# Patient Record
Sex: FEMALE | Race: WHITE | Employment: OTHER | URBAN - METROPOLITAN AREA
[De-identification: names, ages, dates, MRNs, and addresses within clinical notes are randomized per-mention and may not be internally consistent; named-entity substitution may affect disease eponyms.]

---

## 2017-02-20 ENCOUNTER — TELEPHONE (OUTPATIENT)
Dept: FAMILY MEDICINE CLINIC | Age: 72
End: 2017-02-20

## 2017-02-20 NOTE — TELEPHONE ENCOUNTER
Pt states that she is having some frequency along with some occasional burning with urination. She has been pushing fluids today and wanted to see about dropping off a urine specimen and then getting back with her. Advised that we would like to see her. Advised that we have an urgnet care facility out on [address] [facility] that is a walk in and they are open until 9. She will try to call in the am and if she can't come in then she will go there for an appt.

## 2017-02-20 NOTE — TELEPHONE ENCOUNTER
Afsaneh Canon     -      [phone]     -  Has Bladder Infection requesting a call back from nurse

## 2017-02-21 ENCOUNTER — OFFICE VISIT (OUTPATIENT)
Dept: FAMILY MEDICINE CLINIC | Age: 72
End: 2017-02-21

## 2017-02-21 VITALS
BODY MASS INDEX: 26.68 KG/M2 | HEIGHT: 62 IN | WEIGHT: 145 LBS | OXYGEN SATURATION: 98 % | DIASTOLIC BLOOD PRESSURE: 80 MMHG | SYSTOLIC BLOOD PRESSURE: 138 MMHG | HEART RATE: 74 BPM | TEMPERATURE: 98 F | RESPIRATION RATE: 16 BRPM

## 2017-02-21 DIAGNOSIS — R07.81 RIB PAIN ON LEFT SIDE: Primary | ICD-10-CM

## 2017-02-21 DIAGNOSIS — R35.0 URINARY FREQUENCY: ICD-10-CM

## 2017-02-21 DIAGNOSIS — R93.89 ABNORMAL CHEST X-RAY: ICD-10-CM

## 2017-02-21 LAB
BILIRUB UR QL STRIP: NEGATIVE
GLUCOSE UR-MCNC: NEGATIVE MG/DL
KETONES P FAST UR STRIP-MCNC: NEGATIVE MG/DL
PH UR STRIP: 7 [PH] (ref 4.6–8)
PROT UR QL STRIP: NEGATIVE MG/DL
SP GR UR STRIP: 1.01 (ref 1–1.03)
UA UROBILINOGEN AMB POC: NORMAL (ref 0.2–1)
URINALYSIS CLARITY POC: CLEAR
URINALYSIS COLOR POC: YELLOW
URINE BLOOD POC: NEGATIVE
URINE LEUKOCYTES POC: NEGATIVE
URINE NITRITES POC: NEGATIVE

## 2017-02-21 RX ORDER — TIZANIDINE 2 MG/1
2 TABLET ORAL
Qty: 30 TAB | Refills: 1 | Status: SHIPPED | OUTPATIENT
Start: 2017-02-21 | End: 2017-07-18

## 2017-02-21 RX ORDER — NAPROXEN 375 MG/1
375 TABLET ORAL 2 TIMES DAILY WITH MEALS
Qty: 60 TAB | Refills: 0 | Status: SHIPPED | OUTPATIENT
Start: 2017-02-21 | End: 2017-07-18

## 2017-02-21 NOTE — PROGRESS NOTES
Rocky Salgado [address] [facility]  [address] [address] [place], [address]  [phone]             Date of visit: 2/21/2017   Subjective:      History obtained from:  the patient. Leonard Flores is a 73 y.o. female who presents today for left lateral rib pain for 4 days, has had this a couple of times before  Not sure of trigger  Generally active \"always moving furniture around\" but no unusual activity  Has bursitis in shoulder, had injection not long ago  This pain is posterior shoulder and down lateral back, hurts to be active, such as doing laundry  Breathing doesn't hurt  Taking naproxen, helping some  Heat helps some  In the past flexeril helped but doesn't have more    Also burning with urination several times  Trying to drink more water, now has frequency   No UTI since she was a teen    Also never got the CT chest to eval hilar fullness because her cough got better  Willing to check CXR today  Former smoker, quit for good 4 years ago    Was started on norvasc for bp recently  bp 120-130s/70-80s at home, brought in a few readings  Tolerating med fine    Patient Active Problem List    Diagnosis Date Noted    Benign essential hypertension 10/10/2016    Colonoscopy & Mammogram refused 10/10/2016    Dyslipidemia (high LDL; low HDL)      Current Outpatient Prescriptions   Medication Sig Dispense Refill    tiZANidine (ZANAFLEX) 2 mg tablet Take 1 Tab by mouth three (3) times daily as needed. Muscle pain 30 Tab 1    naproxen (NAPROSYN) 375 mg tablet Take 1 Tab by mouth two (2) times daily (with meals). 60 Tab 0    valsartan-hydroCHLOROthiazide (DIOVAN-HCT) 160-25 mg per tablet take 1 tablet by mouth once daily 30 Tab 5    CHOLECALCIFEROL, VITAMIN D3, (VITAMIN D3 PO) Take 2,000 Units by mouth daily.  amLODIPine (NORVASC) 5 mg tablet Take 1 Tab by mouth daily. 30 Tab 3    cetirizine (ZYRTEC) 10 mg tablet Take 1 Tab by mouth nightly.       MULTIVIT WITH CALCIUM,IRON,MIN [facility] DAILY MULTIVITAMIN PO) Take  by mouth daily.  DOCOSAHEXANOIC ACID/EPA (FISH OIL PO) Take 1,000 mg by mouth daily.  aspirin 81 mg tablet Take 81 mg by mouth daily. Indications: MYOCARDIAL INFARCTION PREVENTION       Allergies   Allergen Reactions    Pen-Vee K Itching     Past Medical History   Diagnosis Date    Benign essential hypertension 10/10/2016    Colonoscopy & Mammogram refused 10/10/2016    Dyslipidemia (high LDL; low HDL)     Full dentures     History of tobacco use      Past Surgical History   Procedure Laterality Date    Pr abdomen surgery proc unlisted      Hx gyn       D&C about 14    Hx tympanostomy Right ~     Dr Yemi Morales     Family History   Problem Relation Age of Onset   [facility] Breast Cancer Mother       age 48    Heart Disease Father     Hypertension Father      Social History   Substance Use Topics    Smoking status: Former Smoker     Packs/day: 1.00     Years: 25.00     Quit date: 2008    Smokeless tobacco: Never Used    Alcohol use No      Social History     Social History Narrative        Review of Systems  Gen: denies fever  GI denies n/v     Objective:     Vitals:    17 1623   BP: 138/80   Pulse: 74   Resp: 16   Temp: 98 °F (36.7 °C)   TempSrc: Oral   SpO2: 98%   Weight: 145 lb (65.8 kg)   Height: 5' 2\" (1.575 m)     Body mass index is 26.52 kg/(m^2). General: stated age, well developed, well nourished and in NAD  MSK: tenderness, mild, of left lateral/posterior ribs and just inferior to scapular blade, shoulder with limited ROM due to usual bursitis pain  Neuro: normal gait  Ext:  No edema noted.    Skin:  Normal. and no rash or abnormalities   Psych: alert and oriented to person, place, time and situation and Speech: appropriate quality, quantity and organization of sentences     Results for orders placed or performed in visit on 17   AMB POC URINALYSIS DIP STICK MANUAL W/O MICRO     Status: Normal   Result Value Ref Range Status    Color (UA POC) Yellow  Final    Clarity (UA POC) Clear  Final    Glucose (UA POC) Negative Negative Final    Bilirubin (UA POC) Negative Negative Final    Ketones (UA POC) Negative Negative Final    Specific gravity (UA POC) 1.010 1.001 - 1.035 Final    Blood (UA POC) Negative Negative Final    pH (UA POC) 7.0 4.6 - 8.0 Final    Protein (UA POC) Negative Negative mg/dL Final    Urobilinogen (UA POC) 0.2 mg/dL 0.2 - 1 Final    Nitrites (UA POC) Negative Negative Final    Leukocyte esterase (UA POC) Negative Negative Final   Results for orders placed or performed in visit on 11/05/14   AMB POC URINALYSIS DIP STICK AUTO W/O MICRO     Status: None   Result Value Ref Range Status    Color (UA POC) Light Yellow  Final    Clarity (UA POC) Turbid  Final    Glucose (UA POC) Negative Negative Final    Bilirubin (UA POC) Negative Negative Final    Ketones (UA POC) Negative Negative Final    Specific gravity (UA POC) 1.015 1.001 - 1.035 Final    Blood (UA POC) Negative Negative Final    pH (UA POC) 8.0 4.6 - 8.0 Final    Protein (UA POC) Negative Negative mg/dL Final    Urobilinogen (UA POC) 0.2 mg/dL 0.2 - 1 Final    Nitrites (UA POC) Negative Negative Final    Leukocyte esterase (UA POC) 2+ Negative Final     Assessment/Plan:       ICD-10-CM ICD-9-CM    1. Rib pain on left side R07.81 786.50    2. Urinary frequency R35.0 788.41 AMB POC URINALYSIS DIP STICK MANUAL W/O MICRO   3.  Abnormal chest x-ray R93.8 793.2 XR CHEST PA LAT        Orders Placed This Encounter    XR CHEST PA LAT    AMB POC URINALYSIS DIP STICK MANUAL W/O MICRO    tiZANidine (ZANAFLEX) 2 mg tablet    naproxen (NAPROSYN) 375 mg tablet       Rib pain likely muscular,possibly subscapular bursitis with pain radiating down  Continue heat, naproxen, add prn muscle relaxer as that helped in the past  Precautions about meds given    Reassured about urine  Advised to continue plenty of water  Will retest if symptoms worsen or come back    Will recheck CXR, advised if hilar fullness still she really needs the CT    norvasc seems to be working for bp    Discussed the diagnosis and plan and she expressed understanding. Follow-up Disposition:  Return if symptoms worsen or fail to improve.     Tatianna Hoffman MD

## 2017-02-21 NOTE — PATIENT INSTRUCTIONS
Musculoskeletal Chest Pain: Care Instructions  Your Care Instructions  Chest pain is not always a sign that something is wrong with your heart or that you have another serious problem. The doctor thinks your chest pain is caused by strained muscles or ligaments, inflamed chest cartilage, or another problem in your chest, rather than by your heart. You may need more tests to find the cause of your chest pain. Follow-up care is a key part of your treatment and safety. Be sure to make and go to all appointments, and call your doctor if you are having problems. Its also a good idea to know your test results and keep a list of the medicines you take. How can you care for yourself at home? · Take pain medicines exactly as directed. ¨ If the doctor gave you a prescription medicine for pain, take it as prescribed. ¨ If you are not taking a prescription pain medicine, ask your doctor if you can take an over-the-counter medicine. · Rest and protect the sore area. · Stop, change, or take a break from any activity that may be causing your pain or soreness. · Put ice or a cold pack on the sore area for 10 to 20 minutes at a time. Try to do this every 1 to 2 hours for the next 3 days (when you are awake) or until the swelling goes down. Put a thin cloth between the ice and your skin. · After 2 or 3 days, apply a heating pad set on low or a warm cloth to the area that hurts. Some doctors suggest that you go back and forth between hot and cold. · Do not wrap or tape your ribs for support. This may cause you to take smaller breaths, which could increase your risk of lung problems. · Mentholated creams such as Bengay or Icy Hot may soothe sore muscles. Follow the instructions on the package. · Follow your doctor's instructions for exercising. · Gentle stretching and massage may help you get better faster. Stretch slowly to the point just before pain begins, and hold the stretch for at least 15 to 30 seconds.  Do this 3 or 4 times a day. Stretch just after you have applied heat. · As your pain gets better, slowly return to your normal activities. Any increased pain may be a sign that you need to rest a while longer. When should you call for help? Call 911 anytime you think you may need emergency care. For example, call if:  · You have chest pain or pressure. This may occur with:  ¨ Sweating. ¨ Shortness of breath. ¨ Nausea or vomiting. ¨ Pain that spreads from the chest to the neck, jaw, or one or both shoulders or arms. ¨ Dizziness or lightheadedness. ¨ A fast or uneven pulse. After calling 911, chew 1 adult-strength aspirin. Wait for an ambulance. Do not try to drive yourself. · You have sudden chest pain and shortness of breath, or you cough up blood. Call your doctor now or seek immediate medical care if:  · You have any trouble breathing. · Your chest pain gets worse. · Your chest pain occurs consistently with exercise and is relieved by rest.  Watch closely for changes in your health, and be sure to contact your doctor if:  · Your chest pain does not get better after 1 week. Where can you learn more? Go to http://min-lisbeth.info/. Enter V293 in the search box to learn more about \"Musculoskeletal Chest Pain: Care Instructions. \"  Current as of: May 27, 2016  Content Version: 11.1  © 0278-0590 [employer], Incorporated. Care instructions adapted under license by [employer] (which disclaims liability or warranty for this information). If you have questions about a medical condition or this instruction, always ask your healthcare professional. [place] [address] any warranty or liability for your use of this information.

## 2017-02-21 NOTE — PROGRESS NOTES
Chief Complaint   Patient presents with    Bladder Infection     increased frequency,burning x 4-5 days    Rib Pain     started Friday night on left side, ? pulled muscle       Reviewed Record in preparation for visit and have obtained necessary documentation. Identified pt with two pt identifiers (Name @ )    Health Maintenance Due   Topic    GLAUCOMA SCREENING Q2Y     OSTEOPOROSIS SCREENING (DEXA)     FOBT Q 1 YEAR AGE 50-75          1. Have you been to the ER, urgent care clinic since your last visit? Hospitalized since your last visit? No    2. Have you seen or consulted any other health care providers outside of the [address] [place] [address] since your last visit? Include any pap smears or colon screening.  No

## 2017-02-23 ENCOUNTER — TELEPHONE (OUTPATIENT)
Dept: FAMILY MEDICINE CLINIC | Age: 72
End: 2017-02-23

## 2017-02-23 NOTE — TELEPHONE ENCOUNTER
Please let her know that chest xray was normal this time--no further imaging is needed, thanks, Ferdinand Quinonez MD 2/23/2017 11:12 AM     PI of results.

## 2017-06-12 RX ORDER — VALSARTAN AND HYDROCHLOROTHIAZIDE 160; 25 MG/1; MG/1
TABLET ORAL
Qty: 30 TAB | Refills: 1 | Status: SHIPPED | OUTPATIENT
Start: 2017-06-12 | End: 2017-07-18 | Stop reason: SDUPTHER

## 2017-07-18 ENCOUNTER — OFFICE VISIT (OUTPATIENT)
Dept: FAMILY MEDICINE CLINIC | Age: 72
End: 2017-07-18

## 2017-07-18 VITALS
WEIGHT: 141.8 LBS | BODY MASS INDEX: 26.09 KG/M2 | DIASTOLIC BLOOD PRESSURE: 87 MMHG | SYSTOLIC BLOOD PRESSURE: 149 MMHG | RESPIRATION RATE: 18 BRPM | HEIGHT: 62 IN | TEMPERATURE: 97.4 F | HEART RATE: 76 BPM | OXYGEN SATURATION: 99 %

## 2017-07-18 DIAGNOSIS — N81.9 FEMALE GENITAL PROLAPSE, UNSPECIFIED TYPE: ICD-10-CM

## 2017-07-18 DIAGNOSIS — Z12.11 COLON CANCER SCREENING: ICD-10-CM

## 2017-07-18 DIAGNOSIS — I10 BENIGN ESSENTIAL HYPERTENSION: Primary | ICD-10-CM

## 2017-07-18 DIAGNOSIS — F40.298 NEEDLE PHOBIA: ICD-10-CM

## 2017-07-18 RX ORDER — AMLODIPINE BESYLATE 5 MG/1
5 TABLET ORAL DAILY
Qty: 90 TAB | Refills: 4 | Status: SHIPPED | OUTPATIENT
Start: 2017-07-18 | End: 2017-10-10 | Stop reason: SDUPTHER

## 2017-07-18 RX ORDER — VALSARTAN AND HYDROCHLOROTHIAZIDE 160; 25 MG/1; MG/1
1 TABLET ORAL DAILY
Qty: 90 TAB | Refills: 4 | Status: SHIPPED | OUTPATIENT
Start: 2017-07-18 | End: 2018-07-31 | Stop reason: SDUPTHER

## 2017-07-18 NOTE — MR AVS SNAPSHOT
Visit Information Date & Time Provider Department Dept. Phone Encounter #  
 7/18/2017  1:45 PM Pasquale Haskins, [facility] [phone] [id number] Follow-up Instructions Return in about 4 months (around 11/18/2017). Your Appointments 10/4/2017  1:45 PM  
ROUTINE CARE with Hebert Judd MD  
[facility]) Appt Note: med check [address] [id number]  
[phone]  
  
   
 [address] [address] Upcoming Health Maintenance Date Due  
 GLAUCOMA SCREENING Q2Y 3/19/2012 OSTEOPOROSIS SCREENING (DEXA) 3/19/2012 FOBT Q 1 YEAR AGE 50-75 11/5/2015 INFLUENZA AGE 9 TO ADULT 8/1/2017 Pneumococcal 65+ Low/Medium Risk (2 of 2 - PCV13) 10/10/2017 MEDICARE YEARLY EXAM 10/11/2017 BREAST CANCER SCRN MAMMOGRAM 10/10/2018 DTaP/Tdap/Td series (2 - Td) 11/5/2024 Allergies as of 7/18/2017  Review Complete On: 7/18/2017 By: Pasquale Haskins MD  
  
 Severity Noted Reaction Type Reactions Pen-vee K  11/26/2010    Itching Current Immunizations  Reviewed on 10/10/2016 Name Date H1N1 FLU VACCINE 11/10/2010 Influenza High Dose Vaccine PF 10/10/2016 Influenza Vaccine 11/5/2014, 10/7/2013 Influenza Vaccine (Quad) PF 10/2/2015 Influenza Vaccine Split 10/26/2012, 11/4/2011, 10/8/2009 Pneumococcal Polysaccharide (PPSV-23) 10/10/2016 TD Vaccine 8/31/2003 Tdap 11/5/2014 Not reviewed this visit You Were Diagnosed With   
  
 Codes Comments Benign essential hypertension    -  Primary ICD-10-CM: I10 
ICD-9-CM: 401.1 Colon cancer screening     ICD-10-CM: Z12.11 ICD-9-CM: V76.51 Female genital prolapse, unspecified type     ICD-10-CM: N81.9 ICD-9-CM: 618.9 Vitals BP Pulse Temp Resp Height(growth percentile) Weight(growth percentile) 149/87 (BP 1 Location: Right arm, BP Patient Position: Sitting) 76 97.4 °F (36.3 °C) (Oral) 18 5' 2\" (1.575 m) 141 lb 12.8 oz (64.3 kg) SpO2 BMI OB Status Smoking Status 99% 25.94 kg/m2 Postmenopausal Former Smoker Vitals History BMI and BSA Data Body Mass Index Body Surface Area  
 25.94 kg/m 2 1.68 m 2 Preferred Pharmacy Pharmacy Name Phone [address] [facility], [address] [place] [address] Your Updated Medication List  
  
   
This list is accurate as of: 7/18/17  2:27 PM.  Always use your most recent med list. amLODIPine 5 mg tablet Commonly known as:  NORVASC  
take 1 tablet by mouth once daily  
  
 aspirin 81 mg tablet Take 81 mg by mouth daily. Indications: MYOCARDIAL INFARCTION PREVENTION  
  
 cetirizine 10 mg tablet Commonly known as:  ZYRTEC Take 1 Tab by mouth nightly. FISH OIL PO Take 1,000 mg by mouth daily. valsartan-hydroCHLOROthiazide 160-25 mg per tablet Commonly known as:  DIOVAN-HCT  
take 1 tablet by mouth once daily VITAMIN D3 PO Take 2,000 Units by mouth daily. WOMEN'S DAILY MULTIVITAMIN PO Take  by mouth daily. We Performed the Following OCCULT BLOOD, IMMUNOASSAY (FIT) [id number] CPT(R)] Follow-up Instructions Return in about 4 months (around 11/18/2017). Patient Instructions DASH Diet: Care Instructions Your Care Instructions The DASH diet is an eating plan that can help lower your blood pressure. DASH stands for Dietary Approaches to Stop Hypertension. Hypertension is high blood pressure. The DASH diet focuses on eating foods that are high in calcium, potassium, and magnesium. These nutrients can lower blood pressure. The foods that are highest in these nutrients are fruits, vegetables, low-fat dairy products, nuts, seeds, and legumes.  But taking calcium, potassium, and magnesium supplements instead of eating foods that are high in those nutrients does not have the same effect. The DASH diet also includes whole grains, fish, and poultry. The DASH diet is one of several lifestyle changes your doctor may recommend to lower your high blood pressure. Your doctor may also want you to decrease the amount of sodium in your diet. Lowering sodium while following the DASH diet can lower blood pressure even further than just the DASH diet alone. Follow-up care is a key part of your treatment and safety. Be sure to make and go to all appointments, and call your doctor if you are having problems. It's also a good idea to know your test results and keep a list of the medicines you take. How can you care for yourself at home? Following the DASH diet · Eat 4 to 5 servings of fruit each day. A serving is 1 medium-sized piece of fruit, ½ cup chopped or canned fruit, 1/4 cup dried fruit, or 4 ounces (½ cup) of fruit juice. Choose fruit more often than fruit juice. · Eat 4 to 5 servings of vegetables each day. A serving is 1 cup of lettuce or raw leafy vegetables, ½ cup of chopped or cooked vegetables, or 4 ounces (½ cup) of vegetable juice. Choose vegetables more often than vegetable juice. · Get 2 to 3 servings of low-fat and fat-free dairy each day. A serving is 8 ounces of milk, 1 cup of yogurt, or 1 ½ ounces of cheese. · Eat 6 to 8 servings of grains each day. A serving is 1 slice of bread, 1 ounce of dry cereal, or ½ cup of cooked rice, pasta, or cooked cereal. Try to choose whole-grain products as much as possible. · Limit lean meat, poultry, and fish to 2 servings each day. A serving is 3 ounces, about the size of a deck of cards. · Eat 4 to 5 servings of nuts, seeds, and legumes (cooked dried beans, lentils, and split peas) each week. A serving is 1/3 cup of nuts, 2 tablespoons of seeds, or ½ cup of cooked beans or peas. · Limit fats and oils to 2 to 3 servings each day. A serving is 1 teaspoon of vegetable oil or 2 tablespoons of salad dressing. · Limit sweets and added sugars to 5 servings or less a week. A serving is 1 tablespoon jelly or jam, ½ cup sorbet, or 1 cup of lemonade. · Eat less than 2,300 milligrams (mg) of sodium a day. If you limit your sodium to 1,500 mg a day, you can lower your blood pressure even more. Tips for success · Start small. Do not try to make dramatic changes to your diet all at once. You might feel that you are missing out on your favorite foods and then be more likely to not follow the plan. Make small changes, and stick with them. Once those changes become habit, add a few more changes. · Try some of the following: ¨ Make it a goal to eat a fruit or vegetable at every meal and at snacks. This will make it easy to get the recommended amount of fruits and vegetables each day. ¨ Try yogurt topped with fruit and nuts for a snack or healthy dessert. ¨ Add lettuce, tomato, cucumber, and onion to sandwiches. ¨ Combine a ready-made pizza crust with low-fat mozzarella cheese and lots of vegetable toppings. Try using tomatoes, squash, spinach, broccoli, carrots, cauliflower, and onions. ¨ Have a variety of cut-up vegetables with a low-fat dip as an appetizer instead of chips and dip. ¨ Sprinkle sunflower seeds or chopped almonds over salads. Or try adding chopped walnuts or almonds to cooked vegetables. ¨ Try some vegetarian meals using beans and peas. Add garbanzo or kidney beans to salads. Make burritos and tacos with mashed tipton beans or black beans. Where can you learn more? Go to http://min-lisbeth.info/. Enter H827 in the search box to learn more about \"DASH Diet: Care Instructions. \" Current as of: April 3, 2017 Content Version: 11.3 © 3955-1722 [employer].  Care instructions adapted under license by [address] (which disclaims liability or warranty for this information). If you have questions about a medical condition or this instruction, always ask your healthcare professional. [address] any warranty or liability for your use of this information. Introducing [facility]! Rob Rome introduces [employer] patient portal. Now you can access parts of your medical record, email your doctor's office, and request medication refills online. 1. In your internet browser, go to [email or web address] [employer] [employer] 2. Click on the First Time User? Click Here link in the Sign In box. You will see the New Member Sign Up page. 3. Enter your [employer] Access Code exactly as it appears below. You will not need to use this code after youve completed the sign-up process. If you do not sign up before the expiration date, you must request a new code. · [employer] Access Code: [email or web address] Expires: 10/16/2017  2:26 PM 
 
4. Enter the last four digits of your Social Security Number (xxxx) and Date of Birth (mm/dd/yyyy) as indicated and click Submit. You will be taken to the next sign-up page. 5. Create a [employer] ID. This will be your [employer] login ID and cannot be changed, so think of one that is secure and easy to remember. 6. Create a [employer] password. You can change your password at any time. 7. Enter your Password Reset Question and Answer. This can be used at a later time if you forget your password. 8. Enter your e-mail address. You will receive e-mail notification when new information is available in [address]. 9. Click Sign Up. You can now view and download portions of your medical record. 10. Click the Download Summary menu link to download a portable copy of your medical information. If you have questions, please visit the Frequently Asked Questions section of the [employer] website.  Remember, [employer] is NOT to be used for urgent needs. For medical emergencies, dial 911. Now available from your iPhone and Android! Please provide this summary of care documentation to your next provider. Your primary care clinician is listed as Romario Zuniga. If you have any questions after today's visit, please call [phone].

## 2017-07-18 NOTE — PATIENT INSTRUCTIONS

## 2017-07-18 NOTE — PROGRESS NOTES
Rocky Salgado [address] [place]. [place], [address]  [phone]             Date of visit: 7/18/17   Subjective:      History obtained from:  the patient. Veena Singh is a 81 y.o. female who presents today for mainly needs bp meds refilled    Takes meds regularly  Watching diet more  Avoiding bread  Red meat only once per month  Avoiding spaghetti and pizza  Lots of fish, chicken, salads, fruits and veggies    Was walking on treadmill but has been traveling more with her  who is working and travels a lot  Has been fighting weight since she quit smoking a few years ago  Downfall is pringles, but only 20 per day, no other junk foods    Does check her [id number]    She had rib pains last I saw her but those resolved quickly, thinks the muscle relaxer got rid of it    Calculated risk of breast cancer to be only 3% despite her mom's history; she really doesn't want another mammogram due to pain. Menarche 14  First baby 23  No other relatives      Discussed risks/benefits colon cancer screening, she absolutely doesn't want colonoscopy but willing to do stool test    Occasionally feels something prolapsing from vagina and wonders if she should see gyn. Not often enough that she would want treatment. Has been going on for a long time    Patient Active Problem List    Diagnosis Date Noted    Needle phobia 07/19/2017    Female genital prolapse 07/19/2017    Benign essential hypertension 10/10/2016    Colonoscopy & Mammogram refused 10/10/2016    Dyslipidemia (high LDL; low HDL)      Current Outpatient Prescriptions   Medication Sig Dispense Refill    valsartan-hydroCHLOROthiazide (DIOVAN-HCT) 160-25 mg per tablet Take 1 Tab by mouth daily. 90 Tab 4    amLODIPine (NORVASC) 5 mg tablet Take 1 Tab by mouth daily. 90 Tab 4    CHOLECALCIFEROL, VITAMIN D3, (VITAMIN D3 PO) Take 2,000 Units by mouth daily.       cetirizine (ZYRTEC) 10 mg tablet Take 1 Tab by mouth nightly.  MULTIVIT WITH CALCIUM,IRON,MIN (WOMEN'S DAILY MULTIVITAMIN PO) Take  by mouth daily.  DOCOSAHEXANOIC ACID/EPA (FISH OIL PO) Take 1,000 mg by mouth daily.  aspirin 81 mg tablet Take 81 mg by mouth daily. Indications: MYOCARDIAL INFARCTION PREVENTION       Allergies   Allergen Reactions    Ricardo-Chelsy DEAN Itching     Past Medical History:   Diagnosis Date    Benign essential hypertension 10/10/2016    Colonoscopy & Mammogram refused 10/10/2016    Dyslipidemia (high LDL; low HDL)     Full dentures     History of tobacco use      Past Surgical History:   Procedure Laterality Date    ABDOMEN SURGERY PROC UNLISTED      HX GYN      D&C about Roselee Ahumada HX TYMPANOSTOMY Right ~    Dr Sid Lynch     Family History   Problem Relation Age of Onset   [facility]vin Irvin Breast Cancer Mother       age 46    Heart Disease Father     Hypertension Father      Social History   Substance Use Topics    Smoking status: Former Smoker     Packs/day: 1.00     Years: 25.00     Quit date: 2008    Smokeless tobacco: Never Used    Alcohol use No      Social History     Social History Narrative        Review of Systems  Card: denies chest pain  Pulm: denies shortness of breath       Objective:     Vitals:    17 1342   BP: 149/87   Pulse: 76   Resp: 18   Temp: 97.4 °F (36.3 °C)   TempSrc: Oral   SpO2: 99%   Weight: 141 lb 12.8 oz (64.3 kg)   Height: 5' 2\" (1.575 m)     Body mass index is 25.94 kg/(m^2). General: stated age, well developed, well nourished and in NAD  Neck: supple, symmetrical, trachea midline, no adenopathy and thyroid: not enlarged, symmetric, no tenderness/mass/nodules  Lungs:  clear to auscultation w/o rales, rhonchi, wheezes w/normal effort and no use of accessory muscles of respiration   Heart: regular rate and rhythm, S1, S2 normal, no murmur, click, rub or gallop  Ext:  Trace feet edema noted.    Lymph: no cervical adenopathy appreciated  Skin:  Normal. and no rash or abnormalities Psych: alert and oriented to person, place, time and situation and Speech: appropriate quality, quantity and organization of sentences      Lab Results   Component Value Date/Time    Hemoglobin A1c 5.8 10/02/2015 09:09 AM     Lab Results   Component Value Date/Time    Cholesterol, total 217 10/10/2016 08:24 AM    HDL Cholesterol 46 10/10/2016 08:24 AM    LDL, calculated 132 10/10/2016 08:24 AM    VLDL, calculated 39 10/10/2016 08:24 AM    Triglyceride 195 10/10/2016 08:24 AM    CHOL/HDL Ratio 5.0 10/08/2009 09:43 AM     Lab Results   Component Value Date/Time    Sodium 138 10/10/2016 08:24 AM    Potassium 4.5 10/10/2016 08:24 AM    Chloride 97 10/10/2016 08:24 AM    CO2 25 10/10/2016 08:24 AM    Anion gap 9 10/08/2009 09:43 AM    Glucose 105 10/10/2016 08:24 AM    BUN 12 10/10/2016 08:24 AM    Creatinine 0.77 10/10/2016 08:24 AM    BUN/Creatinine ratio 16 10/10/2016 08:24 AM    GFR est AA 91 10/10/2016 08:24 AM    GFR est non-AA 79 10/10/2016 08:24 AM    Calcium 9.4 10/10/2016 08:24 AM    Bilirubin, total 0.4 10/10/2016 08:24 AM    AST (SGOT) 22 10/10/2016 08:24 AM    Alk. phosphatase 81 10/10/2016 08:24 AM    Protein, total 6.7 10/10/2016 08:24 AM    Albumin 4.6 10/10/2016 08:24 AM    A-G Ratio 2.2 10/10/2016 08:24 AM    ALT (SGPT) 21 10/10/2016 08:24 AM     Lab Results   Component Value Date/Time    WBC 4.7 10/10/2016 08:24 AM    HGB 13.0 10/10/2016 08:24 AM    HCT 39.5 10/10/2016 08:24 AM    PLATELET 964 40/44/6687 08:24 AM    MCV 91 10/10/2016 08:24 AM     Lab Results   Component Value Date/Time    TSH 3.790 10/10/2016 08:24 AM     Lab Results   Component Value Date/Time    Vitamin D 25-Hydroxy 19.6 01/12/2011 08:31 AM    VITAMIN D, 25-HYDROXY 40.5 10/02/2015 09:09 AM           Assessment/Plan:       ICD-10-CM ICD-9-CM    1. Benign essential hypertension I10 401.1    2. Female genital prolapse, unspecified type N81.9 618.9    3. Needle phobia F40.298 300.29    4.  Colon cancer screening Z12.11 V76.51 OCCULT BLOOD, IMMUNOASSAY (FIT)       Orders Placed This Encounter    OCCULT BLOOD, IMMUNOASSAY (FIT)    valsartan-hydroCHLOROthiazide (DIOVAN-HCT) 160-25 mg per tablet    amLODIPine (NORVASC) 5 mg tablet     bp doing well, refill meds  Encouraged continued healthy lifestyle  Declines mammogram and colonoscopy but willing to do FOBT, sent home a kit  Sounds like she is having occasional pelvic organ prolapse but has been going on for a long time, not getting worse, not bothering her much. Will plan to do pelvic exam next visit with her Medicare wellness visit  Declines labs today (needle phobia); ok to put off until next visit    Discussed the diagnosis and plan and she expressed understanding. Follow-up Disposition:  Return in about 4 months (around 11/18/2017).  AWV, pelvic exam    Rafy Cee MD

## 2017-07-18 NOTE — PROGRESS NOTES
Chief Complaint   Patient presents with    Hypertension       Reviewed Record in preparation for visit and have obtained necessary documentation. Identified pt with two pt identifiers (Name @ )    Health Maintenance Due   Topic    GLAUCOMA SCREENING Q2Y     OSTEOPOROSIS SCREENING (DEXA)     FOBT Q 1 YEAR AGE 50-75          1. Have you been to the ER, urgent care clinic since your last visit? Hospitalized since your last visit? No    2. Have you seen or consulted any other health care providers outside of the [address] [place] [address] since your last visit? Include any pap smears or colon screening.  No

## 2017-07-19 PROBLEM — F40.298 NEEDLE PHOBIA: Status: ACTIVE | Noted: 2017-07-19

## 2017-07-19 PROBLEM — N81.9 FEMALE GENITAL PROLAPSE: Status: ACTIVE | Noted: 2017-07-19

## 2017-10-09 ENCOUNTER — TELEPHONE (OUTPATIENT)
Dept: FAMILY MEDICINE CLINIC | Age: 72
End: 2017-10-09

## 2017-10-09 PROBLEM — R73.02 IMPAIRED GLUCOSE TOLERANCE: Status: ACTIVE | Noted: 2017-10-09

## 2017-10-09 NOTE — TELEPHONE ENCOUNTER
LM for return call    Spoke to pt and she just wanted to find out if they needed to get blood work before their appt today. It is too late and will discuss with Dr Gianni Silva at appt today.

## 2017-10-09 NOTE — TELEPHONE ENCOUNTER
----- Message from Suyapa Somers sent at 10/7/2017  9:28 AM EDT -----  Regarding: Dr. Todd Poster: [phone]  Pt is requesting a callback from nurse. The best contact number is [phone].

## 2017-10-10 ENCOUNTER — OFFICE VISIT (OUTPATIENT)
Dept: FAMILY MEDICINE CLINIC | Age: 72
End: 2017-10-10

## 2017-10-10 ENCOUNTER — HOSPITAL ENCOUNTER (OUTPATIENT)
Dept: LAB | Age: 72
Discharge: HOME | End: 2017-10-10
Payer: MEDICARE

## 2017-10-10 VITALS
SYSTOLIC BLOOD PRESSURE: 132 MMHG | RESPIRATION RATE: 15 BRPM | HEIGHT: 62 IN | BODY MASS INDEX: 25.95 KG/M2 | DIASTOLIC BLOOD PRESSURE: 80 MMHG | TEMPERATURE: 97.5 F | WEIGHT: 141 LBS | OXYGEN SATURATION: 100 % | HEART RATE: 71 BPM

## 2017-10-10 DIAGNOSIS — L30.9 DERMATITIS: ICD-10-CM

## 2017-10-10 DIAGNOSIS — R73.02 IMPAIRED GLUCOSE TOLERANCE: ICD-10-CM

## 2017-10-10 DIAGNOSIS — N81.9 FEMALE GENITAL PROLAPSE, UNSPECIFIED TYPE: ICD-10-CM

## 2017-10-10 DIAGNOSIS — Z00.00 MEDICARE ANNUAL WELLNESS VISIT, SUBSEQUENT: Primary | ICD-10-CM

## 2017-10-10 DIAGNOSIS — I10 BENIGN ESSENTIAL HYPERTENSION: ICD-10-CM

## 2017-10-10 DIAGNOSIS — E78.5 DYSLIPIDEMIA (HIGH LDL; LOW HDL): ICD-10-CM

## 2017-10-10 DIAGNOSIS — Z23 ENCOUNTER FOR IMMUNIZATION: ICD-10-CM

## 2017-10-10 PROCEDURE — 80061 LIPID PANEL: CPT

## 2017-10-10 PROCEDURE — 85025 COMPLETE CBC W/AUTO DIFF WBC: CPT

## 2017-10-10 PROCEDURE — 36415 COLL VENOUS BLD VENIPUNCTURE: CPT

## 2017-10-10 PROCEDURE — 83036 HEMOGLOBIN GLYCOSYLATED A1C: CPT

## 2017-10-10 PROCEDURE — 80053 COMPREHEN METABOLIC PANEL: CPT

## 2017-10-10 RX ORDER — AMLODIPINE BESYLATE 5 MG/1
5 TABLET ORAL
Qty: 90 TAB | Refills: 4 | COMMUNITY
Start: 2017-10-10 | End: 2018-07-31 | Stop reason: SDUPTHER

## 2017-10-10 RX ORDER — TRIAMCINOLONE ACETONIDE 1 MG/G
OINTMENT TOPICAL 2 TIMES DAILY
Qty: 30 G | Refills: 0 | Status: SHIPPED | OUTPATIENT
Start: 2017-10-10 | End: 2018-04-26 | Stop reason: SDUPTHER

## 2017-10-10 NOTE — PATIENT INSTRUCTIONS

## 2017-10-10 NOTE — PROGRESS NOTES
Rocky Salgado [address] [facility]. [place], [address] [facility]  [phone]           Date of visit: 10/10/17       This is an Subsequent Medicare Annual Wellness Visit (AWV), (Performed more than 12 months after effective date of Medicare Part B enrollment and 12 months after last preventive visit)    I have reviewed the patient's medical history in detail and updated the computerized patient record. Wanda Campbell is a 81 y.o. female   History obtained from: the patient. Concerns today   (Patient understands that medical problems addressed today may incur additional cost as this is a preventive visit)  -still has the pelvic prolapse, doesn't really hurt or bother her much but can always feel it if she is standing, thinks it is her uterus.  -bp this AM was 97/57 at home. Brought list of BPs and most were in the 120s/70s  Does often feel lightheaded in the morning after taking her meds  -only ate cherrios this AM  Used to take meds at night but then moved them to morning. (norvasc and diovan hct)  If she has coffee first she does fine.    -would like to get her labs while she is here  -feeling well overall  -wants flu shot, not wanting prevnar because she got sick after pneumovax last year.  -has been a few years since she saw eye doctor but will schedule  -itchy rash in axillas after using an old deodorant.  Used an old tube of nystatin/triamcinolone and helped it a lot  -first had tried antifungal alone that did not work  -much better but not gone, wondered if I could refill the nystatin/triamcinolone    History     Patient Active Problem List   Diagnosis Code    Dyslipidemia (high LDL; low HDL) E78.4    Benign essential hypertension I10    Colonoscopy & Mammogram refused Z53.20    Needle phobia F40.298    Female genital prolapse N81.9    Impaired glucose tolerance R73.02     Past Medical History:   Diagnosis Date    Benign essential hypertension 10/10/2016    Colonoscopy & Mammogram refused 10/10/2016    Dyslipidemia (high LDL; low HDL)     Full dentures     History of tobacco use       Past Surgical History:   Procedure Laterality Date    ABDOMEN SURGERY PROC UNLISTED      HX GYN      D&C about 14    HX TYMPANOSTOMY Right ~    Dr Dorie Banerjee     Allergies   Allergen Reactions    Ricardo-Chelsy DEAN Itching     Current Outpatient Prescriptions   Medication Sig Dispense Refill    amLODIPine (NORVASC) 5 mg tablet Take 1 Tab by mouth nightly. 90 Tab 4    triamcinolone acetonide (KENALOG) 0.1 % ointment Apply  to affected area two (2) times a day. use thin layer 30 g 0    valsartan-hydroCHLOROthiazide (DIOVAN-HCT) 160-25 mg per tablet Take 1 Tab by mouth daily. 90 Tab 4    CHOLECALCIFEROL, VITAMIN D3, (VITAMIN D3 PO) Take 2,000 Units by mouth daily.  cetirizine (ZYRTEC) 10 mg tablet Take 1 Tab by mouth nightly.  MULTIVIT WITH CALCIUM,IRON,MIN (WOMEN'S DAILY MULTIVITAMIN PO) Take  by mouth daily.  DOCOSAHEXANOIC ACID/EPA (FISH OIL PO) Take 1,000 mg by mouth daily.  aspirin 81 mg tablet Take 81 mg by mouth daily. Indications: MYOCARDIAL INFARCTION PREVENTION       Family History   Problem Relation Age of Onset   [facility] Breast Cancer Mother       age 48    Heart Disease Father     Hypertension Father      Social History   Substance Use Topics    Smoking status: Former Smoker     Packs/day: 1.00     Years: 25.00     Quit date: 2008    Smokeless tobacco: Never Used    Alcohol use No       Specialists/Care Team   Lorraine Haynes has established care with the following healthcare providers:  Patient Care Team:  Abdirashid Khan MD as PCP - General (Family Practice)  Has an eye doctor will schedule    Health Risk Assessment     Demographics   female  81 y.o. General Health Questions   -During the past 4 weeks:   -how would you rate your health in general? Good   -how often have you been bothered by feeling dizzy when standing up?  Occasionally lightheaded in AM after BP med.   -how much have you been bothered by bodily pain? mildly   -Have you noticed any hearing difficulties? no   -has your physical and emotional health limited your social activities with family or friends? no    Emotional Health Questions   -Do you have a history of depression, anxiety, or emotional problems? no  -Over the past 2 weeks, have you felt down, depressed or hopeless? no  -Over the past 2 weeks, have you felt little interest or pleasure in doing things? no    Health Habits   Please describe your diet habits: they eat very well, plenty of veggies, beef only about once per week, mostly chicken and fish, drinks water  Do you get 5 servings of fruits or vegetables daily? yes  Do you exercise regularly? yes    Activities of Daily Living and Functional Status   -Do you need help with eating, walking, dressing, bathing, toileting, the phone, transportation, shopping, preparing meals, housework, laundry, medications or managing money? no  -In the past four weeks, was someone available to help you if you needed and wanted help with anything? yes  -Are you confident are you that you can control and manage most of your health problems? yes  -Have you been given information to help you keep track of your medications? yes  -How often do you have trouble taking your medications as prescribed? never    Fall Risk and Home Safety   Have you fallen 2 or more times in the past year? no  Does your home have rugs in the hallway, lack grab bars in the bathroom, lack handrails on the stairs or have poor lighting? No, has a grabbar but doesn't use it. Does try to be very careful  Do you have smoke detectors and check them regularly?  yes  Do you have difficulties driving a car? no  Do you always fasten your seat belt when you are in a car? yes    Review of Systems (if indicated for problems addressed today)   Card: denies chest pain  Pulm: denies shortness of breath       Physical Examination Vitals:    10/10/17 1333 10/10/17 1412   BP: 147/87 132/80   Pulse: 71    Resp: 15    Temp: 97.5 °F (36.4 °C)    TempSrc: Oral    SpO2: 100%    Weight: 141 lb (64 kg)    Height: 5' 2\" (1.575 m)      Body mass index is 25.79 kg/(m^2). No exam data present  Was the patient's timed Up & Go test unsteady or longer than 30 seconds? no    Evaluation of Cognitive Function   Mood/affect:  neutral  Orientation: normal  Appearance: age appropriate  Family member/caregiver input: none    Additional exam if indicated for problems addressed today:  General: stated age, well developed, well nourished and in NAD  Neck: supple, symmetrical, trachea midline, no adenopathy and thyroid: not enlarged, symmetric, no tenderness/mass/nodules  Lungs:  clear to auscultation w/o rales, rhonchi, wheezes w/normal effort and no use of accessory muscles of respiration   Heart: regular rate and rhythm, S1, S2 normal, no murmur, click, rub or gallop  Abdomen: soft, nontender, no masses  : about 3cm diameter round mass protruding past introitus with a central dell, consistent with uterus/cervix, easily reducible, nontender, no bladder or rectal prolapse seen or felt, no other lesions, no uterine/adenxal tenderness or enlargement on exam  Ext:  No edema noted.    Lymph: no cervical adenopathy appreciated  Skin:  Normal. and no rash or abnormalities other than mild erythematous patches in both axillas  Psych: alert and oriented to person, place, time and situation and Speech: appropriate quality, quantity and organization of sentences     Advice/Referrals/Counseling (as indicated)   Education and counseling provided for any problems identified above: continued healthy diet/exercise encouraged    Preventive Services     Health Maintenance   Topic Date Due    GLAUCOMA SCREENING Q2Y  03/19/2012    OSTEOPOROSIS SCREENING (DEXA)  03/19/2012    FOBT Q 1 YEAR AGE 50-75  11/05/2015    Pneumococcal 65+ Low/Medium Risk (2 of 2 - PCV13) 10/10/2017  MEDICARE YEARLY EXAM  10/11/2017    BREAST CANCER SCRN MAMMOGRAM  10/10/2018    DTaP/Tdap/Td series (2 - Td) 11/05/2024    Hepatitis C Screening  Addressed    ZOSTER VACCINE AGE 60>  Addressed    INFLUENZA AGE 9 TO ADULT  Completed      (Preventive care checklist to be included in patient instructions)  Discussed today Done Previously Not Needed    X put off prevnar 13 for now   Pneumococcal vaccines   x   Flu vaccine     x Hepatitis B vaccine (if at risk)   X will wait until next year   Shingles vaccine    x  TDAP vaccine   X declines   Mammogram     x Pap smear   X FOBT at home   Colorectal cancer screening     x Low-dose CT for lung cancer screening   X declines now   Bone density test    X few years  Glaucoma screening   x   Cholesterol test   x   Diabetes screening test      x Diabetes self-management class     x Nutritionist referral for diabetes or renal disease     Discussion of Advance Directive   Discussed with Yohana Higgins her ability to prepare and advance directive in the case that an injury or illness causes her to be unable to make health care decisions. Gave her honoring choices packet to take home and consider, she was interested in making an AD. Assessment/Plan   Z00.00    ICD-10-CM ICD-9-CM    1. Medicare annual wellness visit, subsequent Z00.00 V70.0    2. Female genital prolapse, unspecified type N81.9 618.9 REFERRAL TO FEMALE PELVIC MEDICINE AND RECONSTRUCTIVE SURGERY   3. Impaired glucose tolerance R73.02 790.22 HEMOGLOBIN A1C WITH EAG   4. Dyslipidemia (high LDL; low HDL) M13.8 072.3 METABOLIC PANEL, COMPREHENSIVE      LIPID PANEL   5. Benign essential hypertension I10 401.1 CBC WITH AUTOMATED DIFF   6. Dermatitis L30.9 692.9    7.  Encounter for immunization Z23 V03.89 INFLUENZA VIRUS VACCINE, HIGH DOSE SEASONAL, PRESERVATIVE FREE       Orders Placed This Encounter    Influenza virus vaccine (FLUZONE HIGH-DOSE) 65 years and older    CBC WITH AUTOMATED DIFF    METABOLIC PANEL, COMPREHENSIVE    HEMOGLOBIN A1C WITH EAG    LIPID PANEL    CVD REPORT    REFERRAL TO FEMALE PELVIC MEDICINE AND RECONSTRUCTIVE SURGERY    amLODIPine (NORVASC) 5 mg tablet    triamcinolone acetonide (KENALOG) 0.1 % ointment     Preventive care as above, still declining some tests  Doing very well with lifestyle--this was encouraged  Refer to urogyn for the uterine prolapse quite large  Labs for the HTN, HLD, IGT  bp ok here but sometimes low and with dizziness at home. Advised to move the amlodipine to bedtime to spread out meds more. Monitor at home if still often running in 120s we could cut back med some,e sp if she is having dizziness. Try plain triamcinolone ointment to axillary rashes suspect allergic dermatitis    Follow-up Disposition:  Return in about 6 months (around 4/10/2018).     Bharat Loja MD

## 2017-10-10 NOTE — MR AVS SNAPSHOT
Visit Information Date & Time Provider Department Dept. Phone Encounter #  
 10/10/2017  1:30 PM Davey Lo, [address] [phone] [id number] Upcoming Health Maintenance Date Due  
 GLAUCOMA SCREENING Q2Y 3/19/2012 OSTEOPOROSIS SCREENING (DEXA) 3/19/2012 FOBT Q 1 YEAR AGE 50-75 11/5/2015 INFLUENZA AGE 9 TO ADULT 8/1/2017 Pneumococcal 65+ Low/Medium Risk (2 of 2 - PCV13) 10/10/2017 MEDICARE YEARLY EXAM 10/11/2017 BREAST CANCER SCRN MAMMOGRAM 10/10/2018 DTaP/Tdap/Td series (2 - Td) 11/5/2024 Allergies as of 10/10/2017  Review Complete On: 10/10/2017 By: Davey Lo MD  
  
 Severity Noted Reaction Type Reactions Pen-vee K  11/26/2010    Itching Current Immunizations  Reviewed on 10/4/2017 Name Date H1N1 FLU VACCINE 11/10/2010 Influenza High Dose Vaccine PF 10/10/2016 Influenza Vaccine 11/5/2014, 10/7/2013 Influenza Vaccine (Quad) PF 10/2/2015 Influenza Vaccine Split 10/26/2012, 11/4/2011, 10/8/2009 Pneumococcal Polysaccharide (PPSV-23) 10/10/2016 TD Vaccine 8/31/2003 Tdap 11/5/2014 Not reviewed this visit You Were Diagnosed With   
  
 Codes Comments Medicare annual wellness visit, subsequent    -  Primary ICD-10-CM: Z00.00 ICD-9-CM: V70.0 Female genital prolapse, unspecified type     ICD-10-CM: N81.9 ICD-9-CM: 618.9 Impaired glucose tolerance     ICD-10-CM: R73.02 
ICD-9-CM: 790.22 Dyslipidemia (high LDL; low HDL)     ICD-10-CM: E78.4 ICD-9-CM: 272.4 Benign essential hypertension     ICD-10-CM: I10 
ICD-9-CM: 401.1 Vitals BP Pulse Temp Resp Height(growth percentile) Weight(growth percentile) 132/80 71 97.5 °F (36.4 °C) (Oral) 15 5' 2\" (1.575 m) 141 lb (64 kg) SpO2 BMI OB Status Smoking Status 100% 25.79 kg/m2 Postmenopausal Former Smoker Vitals History BMI and BSA Data Body Mass Index Body Surface Area 25.79 kg/m 2 1.67 m 2 Preferred Pharmacy Pharmacy Name Phone [address] [facility], [address] [facility] Your Updated Medication List  
  
   
This list is accurate as of: 10/10/17  2:22 PM.  Always use your most recent med list. amLODIPine 5 mg tablet Commonly known as:  Alf Presser Take 1 Tab by mouth nightly. aspirin 81 mg tablet Take 81 mg by mouth daily. Indications: MYOCARDIAL INFARCTION PREVENTION  
  
 cetirizine 10 mg tablet Commonly known as:  ZYRTEC Take 1 Tab by mouth nightly. FISH OIL PO Take 1,000 mg by mouth daily. triamcinolone acetonide 0.1 % ointment Commonly known as:  KENALOG Apply  to affected area two (2) times a day. use thin layer  
  
 valsartan-hydroCHLOROthiazide 160-25 mg per tablet Commonly known as:  DIOVAN-HCT Take 1 Tab by mouth daily. VITAMIN D3 PO Take 2,000 Units by mouth daily. WOMEN'S DAILY MULTIVITAMIN PO Take  by mouth daily. Prescriptions Sent to Pharmacy Refills  
 triamcinolone acetonide (KENALOG) 0.1 % ointment 0 Sig: Apply  to affected area two (2) times a day. use thin layer Class: Normal  
 Pharmacy: [address] [facility], [address] [place] [address] Ph #: [phone] Route: Topical  
  
We Performed the Following CBC WITH AUTOMATED DIFF [97378 CPT(R)] HEMOGLOBIN A1C WITH EAG [54457 CPT(R)] LIPID PANEL [88665 CPT(R)] METABOLIC PANEL, COMPREHENSIVE [16783 CPT(R)] REFERRAL TO FEMALE PELVIC MEDICINE AND RECONSTRUCTIVE SURGERY [COB110 Custom] Referral Information Referral ID Referred By Referred To  
  
 [id number] [facility] [place] [address] [address] Visits Status Start Date End Date 1 New Request 10/10/17 10/10/18  If your referral has a status of pending review or denied, additional information will be sent to support the outcome of this decision. Patient Instructions Well Visit, Over 72: Care Instructions Your Care Instructions Physical exams can help you stay healthy. Your doctor has checked your overall health and may have suggested ways to take good care of yourself. He or she also may have recommended tests. At home, you can help prevent illness with healthy eating, regular exercise, and other steps. Follow-up care is a key part of your treatment and safety. Be sure to make and go to all appointments, and call your doctor if you are having problems. It's also a good idea to know your test results and keep a list of the medicines you take. How can you care for yourself at home? · Reach and stay at a healthy weight. This will lower your risk for many problems, such as obesity, diabetes, heart disease, and high blood pressure. · Get at least 30 minutes of exercise on most days of the week. Walking is a good choice. You also may want to do other activities, such as running, swimming, cycling, or playing tennis or team sports. · Do not smoke. Smoking can make health problems worse. If you need help quitting, talk to your doctor about stop-smoking programs and medicines. These can increase your chances of quitting for good. · Protect your skin from too much sun. When you're outdoors from 10 a.m. to 4 p.m., stay in the shade or cover up with clothing and a hat with a wide brim. Wear sunglasses that block UV rays. Even when it's cloudy, put broad-spectrum sunscreen (SPF 30 or higher) on any exposed skin. · See a dentist one or two times a year for checkups and to have your teeth cleaned. · Wear a seat belt in the car. · Limit alcohol to 2 drinks a day for men and 1 drink a day for women. Too much alcohol can cause health problems. Follow your doctor's advice about when to have certain tests. These tests can spot problems early. For men and women · Cholesterol. Your doctor will tell you how often to have this done based on your overall health and other things that can increase your risk for heart attack and stroke. · Blood pressure. Have your blood pressure checked during a routine doctor visit. Your doctor will tell you how often to check your blood pressure based on your age, your blood pressure results, and other factors. · Diabetes. Ask your doctor whether you should have tests for diabetes. · Vision. Experts recommend that you have yearly exams for glaucoma and other age-related eye problems. · Hearing. Tell your doctor if you notice any change in your hearing. You can have tests to find out how well you hear. · Colon cancer tests. Keep having colon cancer tests as your doctor recommends. You can have one of several types of tests. · Heart attack and stroke risk. At least every 4 to 6 years, you should have your risk for heart attack and stroke assessed. Your doctor uses factors such as your age, blood pressure, cholesterol, and whether you smoke or have diabetes to show what your risk for a heart attack or stroke is over the next 10 years. · Osteoporosis. Talk to your doctor about whether you should have a bone density test to find out whether you have thinning bones. Also ask your doctor about whether you should take calcium and vitamin D supplements. For women · Pap test and pelvic exam. You may no longer need a Pap test. Talk with your doctor about whether to stop or continue to have Pap tests. · Breast exam and mammogram. Ask how often you should have a mammogram, which is an X-ray of your breasts. A mammogram can spot breast cancer before it can be felt and when it is easiest to treat. · Thyroid disease. Talk to your doctor about whether to have your thyroid checked as part of a regular physical exam. Women have an increased chance of a thyroid problem. For men · Prostate exam. Talk to your doctor about whether you should have a blood test (called a PSA test) for prostate cancer. Experts disagree on whether men should have this test. Some experts recommend that you discuss the benefits and risks of the test with your doctor. · Abdominal aortic aneurysm. Ask your doctor whether you should have a test to check for an aneurysm. You may need a test if you ever smoked or if your parent, brother, sister, or child has had an aneurysm. When should you call for help? Watch closely for changes in your health, and be sure to contact your doctor if you have any problems or symptoms that concern you. Where can you learn more? Go to http://min-lisbeth.info/. Enter K451 in the search box to learn more about \"Well Visit, Over 65: Care Instructions. \" Current as of: July 19, 2016 Content Version: 11.3 © 2194-4351 OfficialVirtualDJ. Care instructions adapted under license by [employer] (which disclaims liability or warranty for this information). If you have questions about a medical condition or this instruction, always ask your healthcare professional. [place] [address] any warranty or liability for your use of this information. Introducing [facility] & HEALTH SERVICES! Jadyn Smith introduces [employer] patient portal. Now you can access parts of your medical record, email your doctor's office, and request medication refills online. 1. In your internet browser, go to [email or web address]://[email or web address] Buy buy tea/Nugg Solutions 2. Click on the First Time User? Click Here link in the Sign In box. You will see the New Member Sign Up page. 3. Enter your Payward Access Code exactly as it appears below. You will not need to use this code after youve completed the sign-up process. If you do not sign up before the expiration date, you must request a new code. · Payward Access Code: [email or web address] Expires: 10/16/2017  2:26 PM 
 
4.  Enter the last four digits of your Social Security Number (xxxx) and Date of Birth (mm/dd/yyyy) as indicated and click Submit. You will be taken to the next sign-up page. 5. Create a Ad Dynamo ID. This will be your Ad Dynamo login ID and cannot be changed, so think of one that is secure and easy to remember. 6. Create a Ad [employer]o password. You can change your password at any time. 7. Enter your Password Reset Question and Answer. This can be used at a later time if you forget your password. 8. Enter your e-mail address. You will receive e-mail notification when new information is available in [address] 9. Click Sign Up. You can now view and download portions of your medical record. 10. Click the Download Summary menu link to download a portable copy of your medical information. If you have questions, please visit the Frequently Asked Questions section of the [employer] website. Remember, [employer]o is NOT to be used for urgent needs. For medical emergencies, dial 911. Now available from your iPhone and Android! Please provide this summary of care documentation to your next provider. Your primary care clinician is listed as Jeffry Mayo. If you have any questions after today's visit, please call [phone].

## 2017-10-10 NOTE — LETTER
10/11/2017 12:53 PM 
 
RE:    Emily Loges [address]  [facility] [address] [id number] Dear Dr. Elizabeth Johns, Thank you for agreeing to see Kirill Warner I am referring my patient to you for evaluation of uterine prolapse for years, getting worse. Not really causing her much pain, but's large enough I worry it will get painful with more time. I appreciate your assistance in Ms. Levi's care  and look forward to your findings and recommendations. Sincerely, Davey Lo MD

## 2017-10-11 LAB
ALBUMIN SERPL-MCNC: 4.8 G/DL (ref 3.5–4.8)
ALBUMIN/GLOB SERPL: 2 {RATIO} (ref 1.2–2.2)
ALP SERPL-CCNC: 96 IU/L (ref 39–117)
ALT SERPL-CCNC: 23 IU/L (ref 0–32)
AST SERPL-CCNC: 22 IU/L (ref 0–40)
BASOPHILS # BLD AUTO: 0 X10E3/UL (ref 0–0.2)
BASOPHILS NFR BLD AUTO: 1 %
BILIRUB SERPL-MCNC: 0.5 MG/DL (ref 0–1.2)
BUN SERPL-MCNC: 13 MG/DL (ref 8–27)
BUN/CREAT SERPL: 17 (ref 12–28)
CALCIUM SERPL-MCNC: 9.5 MG/DL (ref 8.7–10.3)
CHLORIDE SERPL-SCNC: 97 MMOL/L (ref 96–106)
CHOLEST SERPL-MCNC: 220 MG/DL (ref 100–199)
CO2 SERPL-SCNC: 21 MMOL/L (ref 18–29)
CREAT SERPL-MCNC: 0.77 MG/DL (ref 0.57–1)
EOSINOPHIL # BLD AUTO: 0 X10E3/UL (ref 0–0.4)
EOSINOPHIL NFR BLD AUTO: 1 %
ERYTHROCYTE [DISTWIDTH] IN BLOOD BY AUTOMATED COUNT: 13.6 % (ref 12.3–15.4)
EST. AVERAGE GLUCOSE BLD GHB EST-MCNC: 117 MG/DL
GLOBULIN SER CALC-MCNC: 2.4 G/DL (ref 1.5–4.5)
GLUCOSE SERPL-MCNC: 98 MG/DL (ref 65–99)
HBA1C MFR BLD: 5.7 % (ref 4.8–5.6)
HCT VFR BLD AUTO: 36.5 % (ref 34–46.6)
HDLC SERPL-MCNC: 46 MG/DL
HGB BLD-MCNC: 12.4 G/DL (ref 11.1–15.9)
IMM GRANULOCYTES # BLD: 0 X10E3/UL (ref 0–0.1)
IMM GRANULOCYTES NFR BLD: 0 %
INTERPRETATION, 910389: NORMAL
LDLC SERPL CALC-MCNC: 142 MG/DL (ref 0–99)
LYMPHOCYTES # BLD AUTO: 1.2 X10E3/UL (ref 0.7–3.1)
LYMPHOCYTES NFR BLD AUTO: 23 %
MCH RBC QN AUTO: 28.9 PG (ref 26.6–33)
MCHC RBC AUTO-ENTMCNC: 34 G/DL (ref 31.5–35.7)
MCV RBC AUTO: 85 FL (ref 79–97)
MONOCYTES # BLD AUTO: 0.4 X10E3/UL (ref 0.1–0.9)
MONOCYTES NFR BLD AUTO: 7 %
NEUTROPHILS # BLD AUTO: 3.8 X10E3/UL (ref 1.4–7)
NEUTROPHILS NFR BLD AUTO: 68 %
PLATELET # BLD AUTO: 245 X10E3/UL (ref 150–379)
POTASSIUM SERPL-SCNC: 4.2 MMOL/L (ref 3.5–5.2)
PROT SERPL-MCNC: 7.2 G/DL (ref 6–8.5)
RBC # BLD AUTO: 4.29 X10E6/UL (ref 3.77–5.28)
SODIUM SERPL-SCNC: 138 MMOL/L (ref 134–144)
TRIGL SERPL-MCNC: 162 MG/DL (ref 0–149)
VLDLC SERPL CALC-MCNC: 32 MG/DL (ref 5–40)
WBC # BLD AUTO: 5.4 X10E3/UL (ref 3.4–10.8)

## 2017-10-11 RX ORDER — ATORVASTATIN CALCIUM 10 MG/1
10 TABLET, FILM COATED ORAL
Qty: 30 TAB | Refills: 12 | Status: SHIPPED | OUTPATIENT
Start: 2017-10-11 | End: 2018-04-26

## 2017-10-11 NOTE — PROGRESS NOTES
Sent in letter:  Most tests were great, including sugar, kidney, liver, electrolytes, and blood counts! Cholesterol is pretty high, putting you at risk of a heart attack or stroke. I don't see in your chart that you have ever been on a statin cholesterol medicine. I'm going to call one in for you to try. Most people don't get side effects, but muscle aches or bowel changes are a possibility. Please let me know if you have problems with it, but 95% of people don't. It would really lower your risk of a bad event!

## 2017-10-11 NOTE — ACP (ADVANCE CARE PLANNING)
Discussed with Geraldine Martinez her ability to prepare and advance directive in the case that an injury or illness causes her to be unable to make health care decisions. Gave her honoring choices packet to take home and consider, she was interested in making an AD.

## 2017-11-16 ENCOUNTER — TELEPHONE (OUTPATIENT)
Dept: FAMILY MEDICINE CLINIC | Age: 72
End: 2017-11-16

## 2017-11-16 NOTE — TELEPHONE ENCOUNTER
was here, pt can't get a morning appt for surgery to fix her prolapse with the urologist she saw (another lady in Dr. Yuliana Lai office). Also would prefer to go to [facility] instead of [facility]. May be calling to ask for another urologist. Needs one who operates at [facility] and has AM operations.

## 2017-11-17 ENCOUNTER — TELEPHONE (OUTPATIENT)
Dept: FAMILY MEDICINE CLINIC | Age: 72
End: 2017-11-17

## 2017-11-17 NOTE — TELEPHONE ENCOUNTER
Patient states  is suppose to leave a name of another Urologist for her to go to.    Patient call back # [phone]

## 2017-11-18 NOTE — TELEPHONE ENCOUNTER
Planning to call office of Samy Rogers on Monday to verify if she does this type of surgery, if she operates at [facility], and if she has morning OR time (pt not wanting an afternoon operation)  [facility] [place]  Nick Hernandez., [address] [facility], [address] [place]  Office: [phone]  Fax: [phone]

## 2017-11-21 NOTE — TELEPHONE ENCOUNTER
Patient is calling, she is requesting a call back with an update on who Dr. Reta Roper would like her to see.     Best call back # for patient: [phone]

## 2017-11-21 NOTE — TELEPHONE ENCOUNTER
Spoke to  and gave him Dr Billings Livers phone #. He will give them a call. He will have pt call us back if any other questions or concerns.

## 2018-04-26 ENCOUNTER — OFFICE VISIT (OUTPATIENT)
Dept: FAMILY MEDICINE CLINIC | Age: 73
End: 2018-04-26

## 2018-04-26 VITALS
BODY MASS INDEX: 26.87 KG/M2 | DIASTOLIC BLOOD PRESSURE: 84 MMHG | SYSTOLIC BLOOD PRESSURE: 140 MMHG | HEIGHT: 62 IN | WEIGHT: 146 LBS | HEART RATE: 77 BPM | TEMPERATURE: 97.3 F | RESPIRATION RATE: 16 BRPM | OXYGEN SATURATION: 99 %

## 2018-04-26 DIAGNOSIS — E78.5 DYSLIPIDEMIA (HIGH LDL; LOW HDL): ICD-10-CM

## 2018-04-26 DIAGNOSIS — H81.12 BPPV (BENIGN PAROXYSMAL POSITIONAL VERTIGO), LEFT: Primary | ICD-10-CM

## 2018-04-26 DIAGNOSIS — I10 BENIGN ESSENTIAL HYPERTENSION: ICD-10-CM

## 2018-04-26 RX ORDER — TRIAMCINOLONE ACETONIDE 1 MG/G
OINTMENT TOPICAL 2 TIMES DAILY
Qty: 30 G | Refills: 1 | Status: SHIPPED | OUTPATIENT
Start: 2018-04-26 | End: 2020-07-28

## 2018-04-26 RX ORDER — ATORVASTATIN CALCIUM 10 MG/1
10 TABLET, FILM COATED ORAL
Qty: 30 TAB | Refills: 12 | Status: SHIPPED | OUTPATIENT
Start: 2018-04-26 | End: 2019-11-14

## 2018-04-26 NOTE — PROGRESS NOTES
Chief Complaint   Patient presents with    Hypertension     follow up    Dizziness     has been feeling lightheaded        Reviewed Record in preparation for visit and have obtained necessary documentation. Identified pt with two pt identifiers (Name @ )    Health Maintenance Due   Topic    GLAUCOMA SCREENING Q2Y     Bone Densitometry (Dexa) Screening     FOBT Q 1 YEAR AGE 50-75     Pneumococcal 65+ Low/Medium Risk (2 of 2 - PCV13)         1. Have you been to the ER, urgent care clinic since your last visit? Hospitalized since your last visit? No    2. Have you seen or consulted any other health care providers outside of the [address] [place] [address] since your last visit? Include any pap smears or colon screening.  No

## 2018-04-26 NOTE — MR AVS SNAPSHOT
[address] 
[phone] Patient: Deysi Montemayor MRN: [id number] [place] Visit Information Date & Time Provider Department Dept. Phone Encounter #  
 2018  1:00 PM Randolph Bowles, [address] [facility] [phone] [id number] Upcoming Health Maintenance Date Due  
 GLAUCOMA SCREENING Q2Y 3/19/2012 Bone Densitometry (Dexa) Screening 3/19/2012 FOBT Q 1 YEAR AGE 50-75 2015 Pneumococcal 65+ Low/Medium Risk (2 of 2 - PCV13) 10/10/2017 BREAST CANCER SCRN MAMMOGRAM 10/10/2018 MEDICARE YEARLY EXAM 10/11/2018 DTaP/Tdap/Td series (2 - Td) 2024 Allergies as of 2018  Review Complete On: 2018 By: Randolph Bowles MD  
  
 Severity Noted Reaction Type Reactions Pen-vee K  2010    Itching Current Immunizations  Reviewed on 10/4/2017 Name Date H1N1 FLU VACCINE 11/10/2010 Influenza High Dose Vaccine PF 10/10/2017, 10/10/2016 Influenza Vaccine 2014, 10/7/2013 Influenza Vaccine (Quad) PF 10/2/2015 Influenza Vaccine Split 10/26/2012, 2011, 10/8/2009 Pneumococcal Polysaccharide (PPSV-23) 10/10/2016 TD Vaccine 2003 Tdap 2014 Not reviewed this visit You Were Diagnosed With   
  
 Codes Comments BPPV (benign paroxysmal positional vertigo), left    -  Primary ICD-10-CM: H81.12 
ICD-9-CM: 386.11 Benign essential hypertension     ICD-10-CM: I10 
ICD-9-CM: 401.1 Dyslipidemia (high LDL; low HDL)     ICD-10-CM: E78.5 ICD-9-CM: 272.4 Vitals BP Pulse Temp Resp Height(growth percentile) Weight(growth percentile) 140/84 (BP 1 Location: Right arm, BP Patient Position: Sitting) 77 97.3 °F (36.3 °C) (Oral) 16 5' 2\" (1.575 m) 146 lb (66.2 kg) SpO2 BMI OB Status Smoking Status 99% 26.7 kg/m2 Postmenopausal Former Smoker Vitals History BMI and BSA Data Body Mass Index Body Surface Area  
 26.7 kg/m 2 1.7 m 2 Your Updated Medication List  
  
   
This list is accurate as of 4/26/18  1:41 PM.  Always use your most recent med list. amLODIPine 5 mg tablet Commonly known as:  [place] Take 1 Tab by mouth nightly. aspirin 81 mg tablet Take 81 mg by mouth daily. Indications: MYOCARDIAL INFARCTION PREVENTION  
  
 atorvastatin 10 mg tablet Commonly known as:  LIPITOR Take 1 Tab by mouth nightly. Indications: hyperlipidemia  
  
 cetirizine 10 mg tablet Commonly known as:  ZYRTEC Take 1 Tab by mouth nightly. FISH OIL PO Take 1,000 mg by mouth daily. triamcinolone acetonide 0.1 % ointment Commonly known as:  KENALOG Apply  to affected area two (2) times a day. use thin layer  
  
 valsartan-hydroCHLOROthiazide 160-25 mg per tablet Commonly known as:  DIOVAN-HCT Take 1 Tab by mouth daily. VITAMIN D3 PO Take 2,000 Units by mouth daily. WOMEN'S DAILY MULTIVITAMIN PO Take  by mouth daily. Prescriptions Printed Refills  
 triamcinolone acetonide (KENALOG) 0.1 % ointment 1 Sig: Apply  to affected area two (2) times a day. use thin layer Class: Print Route: Topical  
 atorvastatin (LIPITOR) 10 mg tablet 12 Sig: Take 1 Tab by mouth nightly. Indications: hyperlipidemia Class: Print Route: Oral  
  
Introducing \[facility]\"" & HEALTH SERVICES! Mookie Cooper introduces [employer] patient portal. Now you can access parts of your medical record, email your doctor's office, and request medication refills online. 1. In your internet browser, go to [email or web address] 2. Click on the First Time User? Click Here link in the Sign In box. You will see the New Member Sign Up page. 3. Enter your [employer] Access Code exactly as it appears below. You will not need to use this code after youve completed the sign-up process.  If you do not sign up before the expiration date, you must request a new code. · [employer] Access Code: [email or web address] Expires: 7/25/2018 12:50 PM 
 
4. Enter the last four digits of your Social Security Number (xxxx) and Date of Birth (mm/dd/yyyy) as indicated and click Submit. You will be taken to the next sign-up page. 5. Create a [employer] ID. This will be your [employer] login ID and cannot be changed, so think of one that is secure and easy to remember. 6. Create a [employer] password. You can change your password at any time. 7. Enter your Password Reset Question and Answer. This can be used at a later time if you forget your password. 8. Enter your e-mail address. You will receive e-mail notification when new information is available in [address] 9. Click Sign Up. You can now view and download portions of your medical record. 10. Click the Download Summary menu link to download a portable copy of your medical information. If you have questions, please visit the Frequently Asked Questions section of the [employer] website. Remember, [employer] is NOT to be used for urgent needs. For medical emergencies, dial 911. Now available from your iPhone and Android! Please provide this summary of care documentation to your next provider. Your primary care clinician is listed as Ivan Levy. If you have any questions after today's visit, please call [phone].

## 2018-04-26 NOTE — PROGRESS NOTES
Rocky Salgado [address] [place] [address] Ree. [facility], [address]  [phone]             Date of visit: 4/26/18   Subjective:      History obtained from:  the patient. Neo Carroll is a 72 y.o. female who presents today for dizziness, wants to make sure it isn't due to her bp or low BP  Still on her bp med and bp has been ok when checked. Went to ENT for Epply menauver last Thursday a week  Feels much better but not 100%  Notices the dizziness, feeling of movement   When she gets the feeling just lasts a minute  No vomiting/nausea    Not lightheaded when she stands up    Nose is not runny or feeling itchy, ENT recommended nasal gel to lubricate it as it bleeds at times    Needs refill triamcinolone cream for under arms    Didn't take the statin, was concerned about side effects      Patient Active Problem List    Diagnosis Date Noted    BMI 26.0-26.9,adult 04/27/2018    Impaired glucose tolerance 10/09/2017    Needle phobia 07/19/2017    Female genital prolapse 07/19/2017    Benign essential hypertension 10/10/2016    Colonoscopy & Mammogram refused 10/10/2016    Dyslipidemia (high LDL; low HDL)      Current Outpatient Prescriptions   Medication Sig Dispense Refill    triamcinolone acetonide (KENALOG) 0.1 % ointment Apply  to affected area two (2) times a day. use thin layer 30 g 1    atorvastatin (LIPITOR) 10 mg tablet Take 1 Tab by mouth nightly. Indications: hyperlipidemia 30 Tab 12    amLODIPine (NORVASC) 5 mg tablet Take 1 Tab by mouth nightly. 90 Tab 4    valsartan-hydroCHLOROthiazide (DIOVAN-HCT) 160-25 mg per tablet Take 1 Tab by mouth daily. 90 Tab 4    CHOLECALCIFEROL, VITAMIN D3, (VITAMIN D3 PO) Take 2,000 Units by mouth daily.  cetirizine (ZYRTEC) 10 mg tablet Take 1 Tab by mouth nightly.  MULTIVIT WITH CALCIUM,IRON,MIN (WOMEN'S DAILY MULTIVITAMIN PO) Take  by mouth daily.  DOCOSAHEXANOIC ACID/EPA (FISH OIL PO) Take 1,000 mg by mouth daily.  aspirin 81 mg tablet Take 81 mg by mouth daily. Indications: MYOCARDIAL INFARCTION PREVENTION       Allergies   Allergen Reactions    Ricardo-Chelsy DEAN Itching     Past Medical History:   Diagnosis Date    Benign essential hypertension 10/10/2016    Colonoscopy & Mammogram refused 10/10/2016    Dyslipidemia (high LDL; low HDL)     Full dentures     History of tobacco use      Past Surgical History:   Procedure Laterality Date    ABDOMEN SURGERY PROC UNLISTED      HX GYN      D&C about Camilo Garcia HX TYMPANOSTOMY Right ~    Dr Yg Mann     Family History   Problem Relation Age of Onset   Rosette Santacruz Breast Cancer Mother       age 46    Heart Disease Father     Hypertension Father      Social History   Substance Use Topics    Smoking status: Former Smoker     Packs/day: 1.00     Years: 25.00     Quit date: 2008    Smokeless tobacco: Never Used    Alcohol use No      Social History     Social History Narrative        Review of Systems  Gen: denies fever  ENT denies cold symptoms now (did have the flu twice over the winter)   Card: denies chest pain or palpitations       Objective:     Vitals:    18 1304   BP: 140/84   Pulse: 77   Resp: 16   Temp: 97.3 °F (36.3 °C)   TempSrc: Oral   SpO2: 99%   Weight: 146 lb (66.2 kg)   Height: 5' 2\" (1.575 m)     Body mass index is 26.7 kg/(m^2). General: stated age, well developed, well nourished and in NAD  Neck: supple, symmetrical, trachea midline, no adenopathy and thyroid: not enlarged, symmetric, no tenderness/mass/nodules  Lungs:  clear to auscultation w/o rales, rhonchi, wheezes w/normal effort and no use of accessory muscles of respiration   Heart: regular rate and rhythm, S1, S2 normal, no murmur, click, rub or gallop  Abdomen: soft, nontender, no masses  Ext:  No edema noted.    Lymph: no cervical adenopathy appreciated  Skin:  Normal. and no rash or abnormalities   Psych: alert and oriented to person, place, time and situation and Speech: appropriate quality, quantity and organization of sentences  Neuro: CN 2-12 intact, normal gait    Assessment/Plan:       ICD-10-CM ICD-9-CM    1. BPPV (benign paroxysmal positional vertigo), left H81.12 386.11    2. Benign essential hypertension I10 401.1    3. Dyslipidemia (high LDL; low HDL) E78.5 272.4    4. BMI 26.0-26.9,adult Z68.26 V85.22         Orders Placed This Encounter    triamcinolone acetonide (KENALOG) 0.1 % ointment    atorvastatin (LIPITOR) 10 mg tablet       Sounds like bppv, reassured her dizziness wasn't due to her bp  Didn't do Epply here as it sounds like she really felt badly after the last one and wasn't sure she would be able to drive  She will keep appt as planned with ENT for another maneuver when someone can drive her to appt  Much better anyway  Discussed treatment HLD, she is willing to try lipitor  Calculated her risk of MI/CVA to be 15%, explained current guidelines  Have discussed weight before, is actually pretty good for her age    Discussed the diagnosis and plan and she expressed understanding. Follow-up Disposition:  Return if symptoms worsen or fail to improve.     Dominik Mota MD

## 2018-07-12 RX ORDER — TIZANIDINE 2 MG/1
2 TABLET ORAL
Qty: 30 TAB | Refills: 0 | Status: SHIPPED | OUTPATIENT
Start: 2018-07-12 | End: 2018-10-21 | Stop reason: SDUPTHER

## 2018-10-04 ENCOUNTER — CLINICAL SUPPORT (OUTPATIENT)
Dept: FAMILY MEDICINE CLINIC | Age: 73
End: 2018-10-04

## 2018-10-04 DIAGNOSIS — Z23 ENCOUNTER FOR IMMUNIZATION: Primary | ICD-10-CM

## 2018-10-04 NOTE — PROGRESS NOTES
Chief Complaint   Patient presents with    Immunization/Injection     High dose flu     Patient in for High Flu shot tolerated well.

## 2018-10-15 ENCOUNTER — TELEPHONE (OUTPATIENT)
Dept: FAMILY MEDICINE CLINIC | Age: 73
End: 2018-10-15

## 2018-10-15 NOTE — TELEPHONE ENCOUNTER
Patient is requesting call back form Dr Chloé Pérez nurse in regards to her blood work . She has some question .   Best call back : [phone]  LOV:  October 04, 2018

## 2018-10-15 NOTE — TELEPHONE ENCOUNTER
Called pt back. She asked about doing labs prior to her appt. Advised that Dr Tania Abdi prefers to see pt's first then order labs based on what is discussed in that appt. Pt is agreeable to.   appt changed to 11/6/18

## 2018-10-22 RX ORDER — TIZANIDINE 2 MG/1
TABLET ORAL
Qty: 30 TAB | Refills: 0 | Status: SHIPPED | OUTPATIENT
Start: 2018-10-22 | End: 2019-11-14 | Stop reason: SDUPTHER

## 2018-11-05 PROBLEM — R94.6 THYROID FUNCTION STUDY ABNORMALITY: Status: ACTIVE | Noted: 2018-11-05

## 2018-11-06 ENCOUNTER — OFFICE VISIT (OUTPATIENT)
Dept: FAMILY MEDICINE CLINIC | Age: 73
End: 2018-11-06

## 2018-11-06 VITALS
SYSTOLIC BLOOD PRESSURE: 136 MMHG | HEIGHT: 62 IN | DIASTOLIC BLOOD PRESSURE: 82 MMHG | HEART RATE: 76 BPM | TEMPERATURE: 97.4 F | OXYGEN SATURATION: 97 % | WEIGHT: 146.2 LBS | BODY MASS INDEX: 26.91 KG/M2 | RESPIRATION RATE: 16 BRPM

## 2018-11-06 DIAGNOSIS — E28.39 ESTROGEN DEFICIENCY: ICD-10-CM

## 2018-11-06 DIAGNOSIS — R73.02 IMPAIRED GLUCOSE TOLERANCE: ICD-10-CM

## 2018-11-06 DIAGNOSIS — E78.5 DYSLIPIDEMIA (HIGH LDL; LOW HDL): ICD-10-CM

## 2018-11-06 DIAGNOSIS — Z00.00 MEDICARE ANNUAL WELLNESS VISIT, SUBSEQUENT: Primary | ICD-10-CM

## 2018-11-06 DIAGNOSIS — I10 BENIGN ESSENTIAL HYPERTENSION: ICD-10-CM

## 2018-11-06 DIAGNOSIS — R94.6 THYROID FUNCTION STUDY ABNORMALITY: ICD-10-CM

## 2018-11-06 NOTE — ACP (ADVANCE CARE PLANNING)
Discussed with Ilir Lora her ability to prepare and advance directive in the case that an injury or illness causes her to be unable to make health care decisions.    Gave her honoring choices packet to take home last year, had meeting with nurse navigator but hasn't filled it out yet

## 2018-11-06 NOTE — PATIENT INSTRUCTIONS
Well Visit, Over 72: Care Instructions  Your Care Instructions    Physical exams can help you stay healthy. Your doctor has checked your overall health and may have suggested ways to take good care of yourself. He or she also may have recommended tests. At home, you can help prevent illness with healthy eating, regular exercise, and other steps. Follow-up care is a key part of your treatment and safety. Be sure to make and go to all appointments, and call your doctor if you are having problems. It's also a good idea to know your test results and keep a list of the medicines you take. How can you care for yourself at home? · Reach and stay at a healthy weight. This will lower your risk for many problems, such as obesity, diabetes, heart disease, and high blood pressure. · Get at least 30 minutes of exercise on most days of the week. Walking is a good choice. You also may want to do other activities, such as running, swimming, cycling, or playing tennis or team sports. · Do not smoke. Smoking can make health problems worse. If you need help quitting, talk to your doctor about stop-smoking programs and medicines. These can increase your chances of quitting for good. · Protect your skin from too much sun. When you're outdoors from 10 a.m. to 4 p.m., stay in the shade or cover up with clothing and a hat with a wide brim. Wear sunglasses that block UV rays. Even when it's cloudy, put broad-spectrum sunscreen (SPF 30 or higher) on any exposed skin. · See a dentist one or two times a year for checkups and to have your teeth cleaned. · Wear a seat belt in the car. · Limit alcohol to 2 drinks a day for men and 1 drink a day for women. Too much alcohol can cause health problems. Follow your doctor's advice about when to have certain tests. These tests can spot problems early. For men and women  · Cholesterol.  Your doctor will tell you how often to have this done based on your overall health and other things that can increase your risk for heart attack and stroke. · Blood pressure. Have your blood pressure checked during a routine doctor visit. Your doctor will tell you how often to check your blood pressure based on your age, your blood pressure results, and other factors. · Diabetes. Ask your doctor whether you should have tests for diabetes. · Vision. Experts recommend that you have yearly exams for glaucoma and other age-related eye problems. · Hearing. Tell your doctor if you notice any change in your hearing. You can have tests to find out how well you hear. · Colon cancer tests. Keep having colon cancer tests as your doctor recommends. You can have one of several types of tests. · Heart attack and stroke risk. At least every 4 to 6 years, you should have your risk for heart attack and stroke assessed. Your doctor uses factors such as your age, blood pressure, cholesterol, and whether you smoke or have diabetes to show what your risk for a heart attack or stroke is over the next 10 years. · Osteoporosis. Talk to your doctor about whether you should have a bone density test to find out whether you have thinning bones. Also ask your doctor about whether you should take calcium and vitamin D supplements. For women  · Pap test and pelvic exam. You may no longer need a Pap test. Talk with your doctor about whether to stop or continue to have Pap tests. · Breast exam and mammogram. Ask how often you should have a mammogram, which is an X-ray of your breasts. A mammogram can spot breast cancer before it can be felt and when it is easiest to treat. · Thyroid disease. Talk to your doctor about whether to have your thyroid checked as part of a regular physical exam. Women have an increased chance of a thyroid problem. For men  · Prostate exam. Talk to your doctor about whether you should have a blood test (called a PSA test) for prostate cancer.  Experts disagree on whether men should have this test. Some experts recommend that you discuss the benefits and risks of the test with your doctor. · Abdominal aortic aneurysm. Ask your doctor whether you should have a test to check for an aneurysm. You may need a test if you ever smoked or if your parent, brother, sister, or child has had an aneurysm. When should you call for help? Watch closely for changes in your health, and be sure to contact your doctor if you have any problems or symptoms that concern you. Where can you learn more? Go to http://min-lisbeth.info/. Enter E023 in the search box to learn more about \"Well Visit, Over 65: Care Instructions. \"  Current as of: March 29, 2018  Content Version: 11.8  © 4830-5492 [employer]. Care instructions adapted under license by [employer] (which disclaims liability or warranty for this information). If you have questions about a medical condition or this instruction, always ask your healthcare professional. [address] any warranty or liability for your use of this information. DASH Diet: Care Instructions  Your Care Instructions    The DASH diet is an eating plan that can help lower your blood pressure. DASH stands for Dietary Approaches to Stop Hypertension. Hypertension is high blood pressure. The DASH diet focuses on eating foods that are high in calcium, potassium, and magnesium. These nutrients can lower blood pressure. The foods that are highest in these nutrients are fruits, vegetables, low-fat dairy products, nuts, seeds, and legumes. But taking calcium, potassium, and magnesium supplements instead of eating foods that are high in those nutrients does not have the same effect. The DASH diet also includes whole grains, fish, and poultry. The DASH diet is one of several lifestyle changes your doctor may recommend to lower your high blood pressure. Your doctor may also want you to decrease the amount of sodium in your diet.  Lowering sodium while following the DASH diet can lower blood pressure even further than just the DASH diet alone. Follow-up care is a key part of your treatment and safety. Be sure to make and go to all appointments, and call your doctor if you are having problems. It's also a good idea to know your test results and keep a list of the medicines you take. How can you care for yourself at home? Following the DASH diet  · Eat 4 to 5 servings of fruit each day. A serving is 1 medium-sized piece of fruit, ½ cup chopped or canned fruit, 1/4 cup dried fruit, or 4 ounces (½ cup) of fruit juice. Choose fruit more often than fruit juice. · Eat 4 to 5 servings of vegetables each day. A serving is 1 cup of lettuce or raw leafy vegetables, ½ cup of chopped or cooked vegetables, or 4 ounces (½ cup) of vegetable juice. Choose vegetables more often than vegetable juice. · Get 2 to 3 servings of low-fat and fat-free dairy each day. A serving is 8 ounces of milk, 1 cup of yogurt, or 1 ½ ounces of cheese. · Eat 6 to 8 servings of grains each day. A serving is 1 slice of bread, 1 ounce of dry cereal, or ½ cup of cooked rice, pasta, or cooked cereal. Try to choose whole-grain products as much as possible. · Limit lean meat, poultry, and fish to 2 servings each day. A serving is 3 ounces, about the size of a deck of cards. · Eat 4 to 5 servings of nuts, seeds, and legumes (cooked dried beans, lentils, and split peas) each week. A serving is 1/3 cup of nuts, 2 tablespoons of seeds, or ½ cup of cooked beans or peas. · Limit fats and oils to 2 to 3 servings each day. A serving is 1 teaspoon of vegetable oil or 2 tablespoons of salad dressing. · Limit sweets and added sugars to 5 servings or less a week. A serving is 1 tablespoon jelly or jam, ½ cup sorbet, or 1 cup of lemonade. · Eat less than 2,300 milligrams (mg) of sodium a day. If you limit your sodium to 1,500 mg a day, you can lower your blood pressure even more.   Tips for success  · Start small. Do not try to make dramatic changes to your diet all at once. You might feel that you are missing out on your favorite foods and then be more likely to not follow the plan. Make small changes, and stick with them. Once those changes become habit, add a few more changes. · Try some of the following:  ? Make it a goal to eat a fruit or vegetable at every meal and at snacks. This will make it easy to get the recommended amount of fruits and vegetables each day. ? Try yogurt topped with fruit and nuts for a snack or healthy dessert. ? Add lettuce, tomato, cucumber, and onion to sandwiches. ? Combine a ready-made pizza crust with low-fat mozzarella cheese and lots of vegetable toppings. Try using tomatoes, squash, spinach, broccoli, carrots, cauliflower, and onions. ? Have a variety of cut-up vegetables with a low-fat dip as an appetizer instead of chips and dip. ? Sprinkle sunflower seeds or chopped almonds over salads. Or try adding chopped walnuts or almonds to cooked vegetables. ? Try some vegetarian meals using beans and peas. Add garbanzo or kidney beans to salads. Make burritos and tacos with mashed tipton beans or black beans. Where can you learn more? Go to http://min-lisbeth.info/. Enter J777 in the search box to learn more about \"DASH Diet: Care Instructions. \"  Current as of: December 6, 2017  Content Version: 11.8  © 8156-1599 [employer]. Care instructions adapted under license by [employer] (which disclaims liability or warranty for this information). If you have questions about a medical condition or this instruction, always ask your healthcare professional. [place] [address] any warranty or liability for your use of this information.     OK to take the amlodipine in the evening  Can move the valsartan to morning (might make you urinate more  (Preventive care checklist to be included in patient instructions)  Discussed today Done Previously Not Needed    X rx for 13 given X 23 done  Pneumococcal vaccines    x  Flu vaccine     x Hepatitis B vaccine (if at risk)   X rx given   Shingles vaccine    x  TDAP vaccine   X declines   Mammogram     x Pap smear   X FOBT at home, reminded   Colorectal cancer screening     x Low-dose CT for lung cancer screening   X will order   Bone density test    X few years  Glaucoma screening   x   Cholesterol test   x   Diabetes screening test      x Diabetes self-management class     x Nutritionist referral for diabetes or renal disease

## 2018-11-06 NOTE — PROGRESS NOTES
Chief Complaint   Patient presents with    Annual Wellness Visit     fasting        Reviewed Record in preparation for visit and have obtained necessary documentation. Identified pt with two pt identifiers (Name @ )    Health Maintenance Due   Topic    Shingrix Vaccine Age 50> (1 of 2)    GLAUCOMA SCREENING Q2Y     Bone Densitometry (Dexa) Screening     FOBT Q 1 YEAR AGE 50-75     Pneumococcal 65+ Low/Medium Risk (2 of 2 - PCV13)    BREAST CANCER SCRN MAMMOGRAM     MEDICARE YEARLY EXAM          1. Have you been to the ER, urgent care clinic since your last visit? Hospitalized since your last visit? No    2. Have you seen or consulted any other health care providers outside of the [address] [place] [address] since your last visit? Include any pap smears or colon screening.  No

## 2018-11-06 NOTE — PROGRESS NOTES
Rocky Salgado [address] [facility]  [id number] [place] [address] [address] [place], [address]  [phone]           Date of visit: 11/6/2018       This is an Subsequent Medicare Annual Wellness Visit (AWV), (Performed more than 12 months after effective date of Medicare Part B enrollment and 12 months after last preventive visit)    I have reviewed the patient's medical history in detail and updated the computerized patient record. Vinny Denton is a 72 y.o. female   History obtained from: the patient. Concerns today     Mostly doing well, due for labs  -still gets aches/pains shoulder and occasionaly painful catches in left upper back, will start with a certain movement  The tizanidine helps  -stays active, on and off treadmill  -gave up her Pringles  -moving her bp pill to 4am has helped the dizzy problem, so she can go back to bed for a while.  -decided not to take the lipitor, just not ready to go on a new medication  -her bp at home yesterday was 135/?  Mostly ok  -laying on right side will trigger her bppv  Would like me to do epply on her but not today, will make another appt for that  Doesn't think she can do it at home  Has done with ENT in past and really worked    History     Patient Active Problem List   Diagnosis Code    Dyslipidemia (high LDL; low HDL) E78.5    Benign essential hypertension I10    Colonoscopy & Mammogram refused Z53.20    Needle phobia F40.298    Female genital prolapse N81.9    Impaired glucose tolerance R73.02    BMI 26.0-26.9,adult Z68.26    Thyroid function study abnormality R94.6     Past Medical History:   Diagnosis Date    Benign essential hypertension 10/10/2016    Colonoscopy & Mammogram refused 10/10/2016    Dyslipidemia (high LDL; low HDL)     Full dentures     History of tobacco use       Past Surgical History:   Procedure Laterality Date    ABDOMEN SURGERY PROC UNLISTED     [facility]      D&C about 14    HX TYMPANOSTOMY Right ~2008    Dr Mariano Goldberg Allergies   Allergen Reactions    Pen-Vee K Itching     Current Outpatient Medications   Medication Sig Dispense Refill    varicella-zoster recombinant, PF, (SHINGRIX, PF,) 50 mcg/0.5 mL susr injection 0.5 mL by IntraMUSCular route once for 1 dose. 0.5 mL 1    pneumococcal 13 olimpia conj dip (PREVNAR-13) 0.5 mL syrg injection 0.5 mL by IntraMUSCular route once for 1 dose. 0.5 mL 0    tiZANidine (ZANAFLEX) 2 mg tablet take 1 tablet by mouth three times a day if needed for muscle spasm 30 Tab 0    valsartan-hydroCHLOROthiazide (DIOVAN-HCT) 160-25 mg per tablet take 1 tablet by mouth once daily 90 Tab 1    amLODIPine (NORVASC) 5 mg tablet take 1 tablet by mouth once daily 90 Tab 1    triamcinolone acetonide (KENALOG) 0.1 % ointment Apply  to affected area two (2) times a day. use thin layer 30 g 1    CHOLECALCIFEROL, VITAMIN D3, (VITAMIN D3 PO) Take 2,000 Units by mouth daily.  cetirizine (ZYRTEC) 10 mg tablet Take 1 Tab by mouth nightly.  MULTIVIT WITH CALCIUM,IRON,MIN (WOMEN'S DAILY MULTIVITAMIN PO) Take  by mouth daily.  DOCOSAHEXANOIC ACID/EPA (FISH OIL PO) Take 1,000 mg by mouth daily.  aspirin 81 mg tablet Take 81 mg by mouth daily. Indications: MYOCARDIAL INFARCTION PREVENTION      atorvastatin (LIPITOR) 10 mg tablet Take 1 Tab by mouth nightly.  Indications: hyperlipidemia 27 Tab 12     Family History   Problem Relation Age of Onset    Breast Cancer Mother          age 48    Heart Disease Father     Hypertension Father      Social History     Tobacco Use    Smoking status: Former Smoker     Packs/day: 1.00     Years: 25.00     Pack years: 25.00     Last attempt to quit: 2008     Years since quittin.9    Smokeless tobacco: Never Used   Substance Use Topics    Alcohol use: No       Specialists/Care Team   Kadi De has established care with the following healthcare providers:  Patient Care Team:  Mikaela Quiñonez MD as PCP - General (Family Practice)  Debbi Garcia MD (Otolaryngology)  Has an eye doctor Dr. Perla Plata, hasn't been in a few years  Has [address]     Demographics   female  72 y.o. General Health Questions   -During the past 4 weeks:   -how would you rate your health in general? Good   -how often have you been bothered by feeling dizzy when standing up? Occasionally lightheaded in AM after BP med but moved it to 4am   -how much have you been bothered by bodily pain? mildly   -Have you noticed any hearing difficulties? no   -has your physical and emotional health limited your social activities with family or friends? no    Emotional Health Questions   -Do you have a history of depression, anxiety, or emotional problems? no  PHQ over the last two weeks 11/6/2018   PHQ Not Done -   Little interest or pleasure in doing things Not at all   Feeling down, depressed, irritable, or hopeless Not at all   Total Score PHQ 2 0     Health Habits   Please describe your diet habits: they eat very well, plenty of veggies, beef only about once per week, mostly chicken and fish, drinks water  Do you get 5 servings of fruits or vegetables daily? yes  Do you exercise regularly? Yes, treadmill    Activities of Daily Living and Functional Status   -Do you need help with eating, walking, dressing, bathing, toileting, the phone, transportation, shopping, preparing meals, housework, laundry, medications or managing money? no  -In the past four weeks, was someone available to help you if you needed and wanted help with anything? yes  -Are you confident are you that you can control and manage most of your health problems? yes  -Have you been given information to help you keep track of your medications? yes  -How often do you have trouble taking your medications as prescribed? never    Fall Risk and Home Safety   Have you fallen 2 or more times in the past year?  no  Does your home have rugs in the hallway, lack grab bars in the bathroom, lack handrails on the stairs or have poor lighting? Doesn't currently have grabbar, fell off, going to get another, might be moving  Do you have smoke detectors and check them regularly? yes  Do you have difficulties driving a car? no  Do you always fasten your seat belt when you are in a car? yes    Review of Systems (if indicated for problems addressed today)   Card: denies chest pain  GI: denies hematochezia  Pulm: denies shortness of breath    Physical Examination     Vitals:    11/06/18 0831 11/06/18 0911   BP: 153/76 136/82   Pulse: 76    Resp: 16    Temp: 97.4 °F (36.3 °C)    TempSrc: Oral    SpO2: 97%    Weight: 146 lb 3.2 oz (66.3 kg)    Height: 5' 2\" (1.575 m)      Body mass index is 26.74 kg/m². No exam data present  Was the patient's timed Up & Go test unsteady or longer than 30 seconds? no    Evaluation of Cognitive Function   Mood/affect:  neutral  Orientation: normal  Appearance: age appropriate  Family member/caregiver input: none    Additional exam if indicated for problems addressed today:  General: stated age, well-developed, and in NAD  Eyes: PERRL, EOMI, no redness or drainage  Nose: no drainage  Mouth: no lesions  Throat: no erythema, exudate or swelling  Neck: supple, symmetrical, trachea midline, no adenopathy and thyroid: not enlarged, symmetric, no tenderness/mass/nodules  Lungs:  clear to auscultation w/o rales, rhonchi, wheezes w/normal effort and no use of accessory muscles of respiration   Heart: regular rate and rhythm, S1, S2 normal, no murmur, click, rub or gallop  Abdomen: soft, nontender, no masses  Ext:  No edema noted.    Lymph: no cervical adenopathy appreciated  Skin:  Normal. and no rash or abnormalities   Neuro: normal gait, CN 2-12 intact  Psych: alert and oriented to person, place, time and situation and Speech: appropriate quality, quantity and organization of sentences, normal affect  Advice/Referrals/Counseling (as indicated)   Education and counseling provided for any problems identified above: continued healthy diet/exercise encouraged    Preventive Services     Health Maintenance   Topic Date Due    Shingrix Vaccine Age 49> (1 of 2) 03/19/1997    GLAUCOMA SCREENING Q2Y  03/19/2012    Bone Densitometry (Dexa) Screening  03/19/2012    FOBT Q 1 YEAR AGE 50-75  11/05/2015    Pneumococcal 65+ Low/Medium Risk (2 of 2 - PCV13) 10/10/2017    BREAST CANCER SCRN MAMMOGRAM  10/10/2018    MEDICARE YEARLY EXAM  10/11/2018    DTaP/Tdap/Td series (2 - Td) 11/05/2024    Influenza Age 9 to Adult  Completed    Hepatitis C Screening  Addressed      (Preventive care checklist to be included in patient instructions)  Discussed today Done Previously Not Needed    X rx for 13 given X 23 done  Pneumococcal vaccines    x  Flu vaccine     x Hepatitis B vaccine (if at risk)   X rx given   Shingles vaccine    x  TDAP vaccine   X declines   Mammogram     x Pap smear   X FOBT at home, reminded   Colorectal cancer screening     x Low-dose CT for lung cancer screening   X will order   Bone density test    X few years  Glaucoma screening   x   Cholesterol test   x   Diabetes screening test      x Diabetes self-management class     x Nutritionist referral for diabetes or renal disease     Discussion of Advance Directive   Discussed with Ab Joiner her ability to prepare and advance directive in the case that an injury or illness causes her to be unable to make health care decisions. Gave her honoring choices packet to take home last year, had meeting with nurse navigator but hasn't filled it out yet    Assessment/Plan   Z00.00    ICD-10-CM ICD-9-CM    1. Medicare annual wellness visit, subsequent Z00.00 V70.0    2. Benign essential hypertension I10 401.1 CBC WITH AUTOMATED DIFF      METABOLIC PANEL, COMPREHENSIVE   3. Thyroid function study abnormality R94.6 794.5 TSH 3RD GENERATION   4. Dyslipidemia (high LDL; low HDL) E78.5 272.4 LIPID PANEL   5.  Impaired glucose tolerance R73.02 790.22 HEMOGLOBIN A1C WITH EAG   6. Estrogen deficiency E28.39 256.39 DEXA BONE DENSITY STUDY AXIAL       Orders Placed This Encounter    DEXA BONE DENSITY STUDY AXIAL    CBC WITH AUTOMATED DIFF    METABOLIC PANEL, COMPREHENSIVE    LIPID PANEL    HEMOGLOBIN A1C WITH EAG    TSH 3RD GENERATION    varicella-zoster recombinant, PF, (SHINGRIX, PF,) 50 mcg/0.5 mL susr injection    pneumococcal 13 olimpia conj dip (PREVNAR-13) 0.5 mL syrg injection     Preventive care as above, willing to do DEXA this year which I think is important for her; fairly high risk with race/size/history smoking  Reviewed shots she can get at pharmacy  Labs for chronic problems  Encouraged her to see eye doc as it has been a few years  Tolerating meds and bp controlled, no med changes  Very healthy weight and lifestyle, encouraged her to keep up the good work! Follow-up Disposition:  Return in about 6 months (around 5/6/2019) for Follow up.     Jessica Lam MD

## 2018-11-06 NOTE — LETTER
11/7/2018 9:45 AM 
 
Ms. Katelynn Gold [address] [id number] Dear Katelynn Gold: Please find your most recent results below. Resulted Orders CBC WITH AUTOMATED DIFF Result Value Ref Range WBC 4.9 3.4 - 10.8 x10E3/uL  
 RBC 4.25 3.77 - 5.28 x10E6/uL HGB 12.4 11.1 - 15.9 g/dL HCT 36.9 34.0 - 46.6 % MCV 87 79 - 97 fL  
 MCH 29.2 26.6 - 33.0 pg  
 MCHC 33.6 31.5 - 35.7 g/dL  
 RDW 14.2 12.3 - 15.4 % PLATELET 881 867 - 919 x10E3/uL NEUTROPHILS 64 Not Estab. % Lymphocytes 25 Not Estab. % MONOCYTES 8 Not Estab. % EOSINOPHILS 2 Not Estab. % BASOPHILS 1 Not Estab. %  
 ABS. NEUTROPHILS 3.2 1.4 - 7.0 x10E3/uL Abs Lymphocytes 1.2 0.7 - 3.1 x10E3/uL  
 ABS. MONOCYTES 0.4 0.1 - 0.9 x10E3/uL  
 ABS. EOSINOPHILS 0.1 0.0 - 0.4 x10E3/uL  
 ABS. BASOPHILS 0.0 0.0 - 0.2 x10E3/uL IMMATURE GRANULOCYTES 0 Not Estab. %  
 ABS. IMM. GRANS. 0.0 0.0 - 0.1 x10E3/uL Narrative Performed at:  [address]  [id number] : Claudeen Scott MD, Phone:  [phone] METABOLIC PANEL, COMPREHENSIVE Result Value Ref Range Glucose 100 (H) 65 - 99 mg/dL BUN 10 8 - 27 mg/dL Creatinine 0.87 0.57 - 1.00 mg/dL GFR est non-AA 67 >59 mL/min/1.73 GFR est AA 78 >59 mL/min/1.73  
 BUN/Creatinine ratio 11 (L) 12 - 28 Sodium 136 134 - 144 mmol/L Potassium 4.3 3.5 - 5.2 mmol/L Chloride 99 96 - 106 mmol/L  
 CO2 23 20 - 29 mmol/L Calcium 9.8 8.7 - 10.3 mg/dL Protein, total 7.5 6.0 - 8.5 g/dL Albumin 4.8 3.5 - 4.8 g/dL GLOBULIN, TOTAL 2.7 1.5 - 4.5 g/dL A-G Ratio 1.8 1.2 - 2.2 Bilirubin, total 0.4 0.0 - 1.2 mg/dL Alk. phosphatase 101 39 - 117 IU/L  
 AST (SGOT) 19 0 - 40 IU/L  
 ALT (SGPT) 21 0 - 32 IU/L  
LIPID PANEL Result Value Ref Range Cholesterol, total 199 100 - 199 mg/dL Triglyceride 222 (H) 0 - 149 mg/dL HDL Cholesterol 47 >39 mg/dL VLDL, calculated 44 (H) 5 - 40 mg/dL LDL, calculated 108 (H) 0 - 99 mg/dL HEMOGLOBIN A1C WITH EAG Result Value Ref Range Hemoglobin A1c 5.7 (H) 4.8 - 5.6 % Comment:  
            Prediabetes: 5.7 - 6.4 Diabetes: >6.4 Glycemic control for adults with diabetes: <7.0 Estimated average glucose 117 mg/dL TSH 3RD GENERATION Result Value Ref Range TSH 2.270 0.450 - 4.500 uIU/mL RECOMMENDATIONS: 
All of your labs were good, including blood counts, kidney, liver, sugar, electrolytes, and cholesterol. Keep up the good work with healthy lifestyle!!! 
 
Please call me if you have any questions: [phone] Sincerely, Nelsy Flowers MD

## 2018-11-07 PROBLEM — R94.6 THYROID FUNCTION STUDY ABNORMALITY: Status: RESOLVED | Noted: 2018-11-05 | Resolved: 2018-11-07

## 2018-11-07 LAB
ALBUMIN SERPL-MCNC: 4.8 G/DL (ref 3.5–4.8)
ALBUMIN/GLOB SERPL: 1.8 {RATIO} (ref 1.2–2.2)
ALP SERPL-CCNC: 101 IU/L (ref 39–117)
ALT SERPL-CCNC: 21 IU/L (ref 0–32)
AST SERPL-CCNC: 19 IU/L (ref 0–40)
BASOPHILS # BLD AUTO: 0 X10E3/UL (ref 0–0.2)
BASOPHILS NFR BLD AUTO: 1 %
BILIRUB SERPL-MCNC: 0.4 MG/DL (ref 0–1.2)
BUN SERPL-MCNC: 10 MG/DL (ref 8–27)
BUN/CREAT SERPL: 11 (ref 12–28)
CALCIUM SERPL-MCNC: 9.8 MG/DL (ref 8.7–10.3)
CHLORIDE SERPL-SCNC: 99 MMOL/L (ref 96–106)
CHOLEST SERPL-MCNC: 199 MG/DL (ref 100–199)
CO2 SERPL-SCNC: 23 MMOL/L (ref 20–29)
CREAT SERPL-MCNC: 0.87 MG/DL (ref 0.57–1)
EOSINOPHIL # BLD AUTO: 0.1 X10E3/UL (ref 0–0.4)
EOSINOPHIL NFR BLD AUTO: 2 %
ERYTHROCYTE [DISTWIDTH] IN BLOOD BY AUTOMATED COUNT: 14.2 % (ref 12.3–15.4)
EST. AVERAGE GLUCOSE BLD GHB EST-MCNC: 117 MG/DL
GLOBULIN SER CALC-MCNC: 2.7 G/DL (ref 1.5–4.5)
GLUCOSE SERPL-MCNC: 100 MG/DL (ref 65–99)
HBA1C MFR BLD: 5.7 % (ref 4.8–5.6)
HCT VFR BLD AUTO: 36.9 % (ref 34–46.6)
HDLC SERPL-MCNC: 47 MG/DL
HGB BLD-MCNC: 12.4 G/DL (ref 11.1–15.9)
IMM GRANULOCYTES # BLD: 0 X10E3/UL (ref 0–0.1)
IMM GRANULOCYTES NFR BLD: 0 %
INTERPRETATION, 910389: NORMAL
LDLC SERPL CALC-MCNC: 108 MG/DL (ref 0–99)
LYMPHOCYTES # BLD AUTO: 1.2 X10E3/UL (ref 0.7–3.1)
LYMPHOCYTES NFR BLD AUTO: 25 %
MCH RBC QN AUTO: 29.2 PG (ref 26.6–33)
MCHC RBC AUTO-ENTMCNC: 33.6 G/DL (ref 31.5–35.7)
MCV RBC AUTO: 87 FL (ref 79–97)
MONOCYTES # BLD AUTO: 0.4 X10E3/UL (ref 0.1–0.9)
MONOCYTES NFR BLD AUTO: 8 %
NEUTROPHILS # BLD AUTO: 3.2 X10E3/UL (ref 1.4–7)
NEUTROPHILS NFR BLD AUTO: 64 %
PLATELET # BLD AUTO: 254 X10E3/UL (ref 150–379)
POTASSIUM SERPL-SCNC: 4.3 MMOL/L (ref 3.5–5.2)
PROT SERPL-MCNC: 7.5 G/DL (ref 6–8.5)
RBC # BLD AUTO: 4.25 X10E6/UL (ref 3.77–5.28)
SODIUM SERPL-SCNC: 136 MMOL/L (ref 134–144)
TRIGL SERPL-MCNC: 222 MG/DL (ref 0–149)
TSH SERPL DL<=0.005 MIU/L-ACNC: 2.27 UIU/ML (ref 0.45–4.5)
VLDLC SERPL CALC-MCNC: 44 MG/DL (ref 5–40)
WBC # BLD AUTO: 4.9 X10E3/UL (ref 3.4–10.8)

## 2018-11-07 NOTE — PROGRESS NOTES
Sent in letter:  All of your labs were good, including blood counts, kidney, liver, sugar, electrolytes, and cholesterol. Keep up the good work with healthy lifestyle! !!

## 2019-01-26 RX ORDER — VALSARTAN AND HYDROCHLOROTHIAZIDE 160; 25 MG/1; MG/1
TABLET ORAL
Qty: 90 TAB | Refills: 1 | Status: SHIPPED | OUTPATIENT
Start: 2019-01-26 | End: 2019-07-19 | Stop reason: SDUPTHER

## 2019-01-26 RX ORDER — AMLODIPINE BESYLATE 5 MG/1
TABLET ORAL
Qty: 90 TAB | Refills: 1 | Status: SHIPPED | OUTPATIENT
Start: 2019-01-26 | End: 2019-07-19 | Stop reason: SDUPTHER

## 2019-02-26 ENCOUNTER — OFFICE VISIT (OUTPATIENT)
Dept: FAMILY MEDICINE CLINIC | Age: 74
End: 2019-02-26

## 2019-02-26 VITALS
RESPIRATION RATE: 18 BRPM | OXYGEN SATURATION: 98 % | SYSTOLIC BLOOD PRESSURE: 138 MMHG | TEMPERATURE: 98.5 F | DIASTOLIC BLOOD PRESSURE: 66 MMHG | HEART RATE: 80 BPM | HEIGHT: 62 IN | BODY MASS INDEX: 26.68 KG/M2 | WEIGHT: 145 LBS

## 2019-02-26 DIAGNOSIS — H00.011 HORDEOLUM EXTERNUM OF RIGHT UPPER EYELID: ICD-10-CM

## 2019-02-26 DIAGNOSIS — H10.9 BACTERIAL CONJUNCTIVITIS OF RIGHT EYE: Primary | ICD-10-CM

## 2019-02-26 RX ORDER — CIPROFLOXACIN HYDROCHLORIDE 3.5 MG/ML
1 SOLUTION/ DROPS TOPICAL
Qty: 5 ML | Refills: 0 | Status: SHIPPED | OUTPATIENT
Start: 2019-02-26 | End: 2019-03-08

## 2019-02-26 NOTE — PROGRESS NOTES
Chief Complaint   Patient presents with    Stye     right eye x2 days pt has been doing warm compresses at home        1. Have you been to the ER, urgent care clinic since your last visit? Hospitalized since your last visit? No    2. Have you seen or consulted any other health care providers outside of the [address] [place] [address] since your last visit? Include any pap smears or colon screening.  No

## 2019-02-26 NOTE — PROGRESS NOTES
Rocky Salgado [facility]  [place]. [place], [address]  [phone]             Date of visit: 2/26/19   Subjective:      History obtained from:  the patient. Radha Jennings is a 72 y.o. female who presents today for right eye red/itchy with stye upper lid for 2 days getting worse  Very goopy, got up 2x last night to clean it out overnight  thinks vision ok  Still has not seen an eye doctor  Her  is going to see Dr. Shanita Colon at [facility] and she may want to see him too after  straight         Patient Active Problem List    Diagnosis Date Noted    BMI 26.0-26.9,adult 04/27/2018    Impaired glucose tolerance 10/09/2017    Needle phobia 07/19/2017    Female genital prolapse 07/19/2017    Benign essential hypertension 10/10/2016    Dyslipidemia (high LDL; low HDL)      Current Outpatient Medications   Medication Sig Dispense Refill    ciprofloxacin HCl (CILOXAN) 0.3 % ophthalmic solution Administer 1 Drop to right eye every two (2) hours for 10 days. 5 mL 0    valsartan-hydroCHLOROthiazide (DIOVAN-HCT) 160-25 mg per tablet take 1 tablet by mouth once daily 90 Tab 1    amLODIPine (NORVASC) 5 mg tablet take 1 tablet by mouth once daily 90 Tab 1    tiZANidine (ZANAFLEX) 2 mg tablet take 1 tablet by mouth three times a day if needed for muscle spasm 30 Tab 0    triamcinolone acetonide (KENALOG) 0.1 % ointment Apply  to affected area two (2) times a day. use thin layer 30 g 1    CHOLECALCIFEROL, VITAMIN D3, (VITAMIN D3 PO) Take 2,000 Units by mouth daily.  cetirizine (ZYRTEC) 10 mg tablet Take 1 Tab by mouth nightly.  DOCOSAHEXANOIC ACID/EPA (FISH OIL PO) Take 1,000 mg by mouth daily.  aspirin 81 mg tablet Take 81 mg by mouth daily. Indications: MYOCARDIAL INFARCTION PREVENTION      atorvastatin (LIPITOR) 10 mg tablet Take 1 Tab by mouth nightly.  Indications: hyperlipidemia 30 Tab 12    MULTIVIT WITH CALCIUM,IRON,MIN (WOMEN'S DAILY MULTIVITAMIN PO) Take  by mouth daily. Allergies   Allergen Reactions    Pen-Vee DIANNE Itching     Past Medical History:   Diagnosis Date    Benign essential hypertension 10/10/2016    Colonoscopy & Mammogram refused 10/10/2016    Dyslipidemia (high LDL; low HDL)     Full dentures     History of tobacco use      Past Surgical History:   Procedure Laterality Date    ABDOMEN SURGERY PROC UNLISTED      HX GYN      D&C about [facility] HX TYMPANOSTOMY Right ~    Dr Mason Salinas     Family History   Problem Relation Age of Onset   [facility] Breast Cancer Mother          age 46    Heart Disease Father     Hypertension Father      Social History     Tobacco Use    Smoking status: Former Smoker     Packs/day: 1.00     Years: 25.00     Pack years: 25.00     Last attempt to quit: 2008     Years since quitting: 10.2    Smokeless tobacco: Never Used   Substance Use Topics    Alcohol use: No      Social History     Social History Narrative    Not on file        Review of Systems  Gen: denies fever         Objective:     Vitals:    19 1447   BP: 138/66   Pulse: 80   Resp: 18   Temp: 98.5 °F (36.9 °C)   TempSrc: Oral   SpO2: 98%   Weight: 145 lb (65.8 kg)   Height: 5' 2\" (1.575 m)     Body mass index is 26.52 kg/m². General: stated age, well developed, well nourished and in NAD  Eyes: left appears normal  Right eye conjunctiva diffusely injected and with yellow drainage, upper lid lateral part with tender swelling consistent with small stye but pore/opening not seen  Skin:  No rashes elsewhere  Psych: alert and oriented to person, place, time and situation and Speech: appropriate quality, quantity and organization of sentences      Assessment/Plan:       ICD-10-CM ICD-9-CM    1. Bacterial conjunctivitis of right eye H10.9 372.39      041.9    2.  Hordeolum externum of right upper eyelid H00.011 373.11         Orders Placed This Encounter    ciprofloxacin HCl (CILOXAN) 0.3 % ophthalmic solution       Advised warm compresses  Also antibiotic drops as she does have conjunctivitis that could very well be bacterial, given the amount of drainage  Advised to see eye doc, not so much for this but just a regular check up/glaucoma screening, she plans to go to [facility]    Gave her number to call to schedule dexa, she is still willing to do that  Still declines mammogram    Discussed the diagnosis and plan and she expressed understanding. Follow-up Disposition:  Return in about 3 months (around 5/26/2019) for Follow up, labs.     Rafy Cee MD

## 2019-02-26 NOTE — PATIENT INSTRUCTIONS
Pinkeye: Care Instructions  Your Care Instructions    Pinkeye is redness and swelling of the eye surface and the conjunctiva (the lining of the eyelid and the covering of the white part of the eye). Pinkeye is also called conjunctivitis. Pinkeye is often caused by infection with bacteria or a virus. Dry air, allergies, smoke, and chemicals are other common causes. Pinkeye often clears on its own in 7 to 10 days. Antibiotics only help if the pinkeye is caused by bacteria. Pinkeye caused by infection spreads easily. If an allergy or chemical is causing pinkeye, it will not go away unless you can avoid whatever is causing it. Follow-up care is a key part of your treatment and safety. Be sure to make and go to all appointments, and call your doctor if you are having problems. It's also a good idea to know your test results and keep a list of the medicines you take. How can you care for yourself at home? · Wash your hands often. Always wash them before and after you treat pinkeye or touch your eyes or face. · Use moist cotton or a clean, wet cloth to remove crust. Wipe from the inside corner of the eye to the outside. Use a clean part of the cloth for each wipe. · Put cold or warm wet cloths on your eye a few times a day if the eye hurts. · Do not wear contact lenses or eye makeup until the pinkeye is gone. Throw away any eye makeup you were using when you got pinkeye. Clean your contacts and storage case. If you wear disposable contacts, use a new pair when your eye has cleared and it is safe to wear contacts again. · If the doctor gave you antibiotic ointment or eyedrops, use them as directed. Use the medicine for as long as instructed, even if your eye starts looking better soon. Keep the bottle tip clean, and do not let it touch the eye area. · To put in eyedrops or ointment:  ? Tilt your head back, and pull your lower eyelid down with one finger. ?  Drop or squirt the medicine inside the lower lid.  ? Close your eye for 30 to 60 seconds to let the drops or ointment move around. ? Do not touch the ointment or dropper tip to your eyelashes or any other surface. · Do not share towels, pillows, or washcloths while you have pinkeye. When should you call for help? Call your doctor now or seek immediate medical care if:    · You have pain in your eye, not just irritation on the surface.     · You have a change in vision or loss of vision.     · You have an increase in discharge from the eye.     · Your eye has not started to improve or begins to get worse within 48 hours after you start using antibiotics.     · Pinkeye lasts longer than 7 days.    Watch closely for changes in your health, and be sure to contact your doctor if you have any problems. Where can you learn more? Go to http://min-lisbeth.info/. Enter Y392 in the search box to learn more about \"Pinkeye: Care Instructions. \"  Current as of: September 23, 2018  Content Version: 11.9  © 1114-1880 [employer]. Care instructions adapted under license by [employer] (which disclaims liability or warranty for this information). If you have questions about a medical condition or this instruction, always ask your healthcare professional. [address] any warranty or liability for your use of this information.

## 2019-07-19 RX ORDER — AMLODIPINE BESYLATE 5 MG/1
TABLET ORAL
Qty: 90 TAB | Refills: 0 | Status: SHIPPED | OUTPATIENT
Start: 2019-07-19 | End: 2019-10-17 | Stop reason: SDUPTHER

## 2019-07-19 RX ORDER — VALSARTAN AND HYDROCHLOROTHIAZIDE 160; 25 MG/1; MG/1
TABLET ORAL
Qty: 90 TAB | Refills: 0 | Status: SHIPPED | OUTPATIENT
Start: 2019-07-19 | End: 2019-10-17 | Stop reason: SDUPTHER

## 2019-07-19 NOTE — TELEPHONE ENCOUNTER
Last seen 2/26/19, was due f/u in May.   I have called an LVM that she is past due appt and that Dr Teagan Tilley is booking into Sept.  Needs to call back to schedule

## 2019-08-15 ENCOUNTER — TELEPHONE (OUTPATIENT)
Dept: FAMILY MEDICINE CLINIC | Age: 74
End: 2019-08-15

## 2019-08-15 NOTE — TELEPHONE ENCOUNTER
Pt is requesting MJ to call back in regards to Whooping cough vaccination, she is going to family member who just had a baby and they need to know about how to get it and will it be covered by her insurance   BCB# [phone]  LOV :  February 26, 2019

## 2019-09-11 ENCOUNTER — TELEPHONE (OUTPATIENT)
Dept: FAMILY MEDICINE CLINIC | Age: 74
End: 2019-09-11

## 2019-09-11 NOTE — TELEPHONE ENCOUNTER
Will you refill the Ciprofloxacin  eye drops for  ( stye. The above came from husbands ulysses. I explained that we can't deal with other pt's in his chart.

## 2019-10-17 RX ORDER — VALSARTAN AND HYDROCHLOROTHIAZIDE 160; 25 MG/1; MG/1
TABLET ORAL
Qty: 90 TAB | Refills: 0 | Status: SHIPPED | OUTPATIENT
Start: 2019-10-17 | End: 2020-01-13

## 2019-10-17 RX ORDER — AMLODIPINE BESYLATE 5 MG/1
TABLET ORAL
Qty: 90 TAB | Refills: 0 | Status: SHIPPED | OUTPATIENT
Start: 2019-10-17 | End: 2020-01-13

## 2019-10-17 NOTE — TELEPHONE ENCOUNTER
Pharmacy is requesting medication refill   Requested Prescriptions     Pending Prescriptions Disp Refills    valsartan-hydroCHLOROthiazide (DIOVAN-HCT) 160-25 mg per tablet 90 Tab 0     Sig: take 1 tablet by mouth once daily, needs appt    amLODIPine (NORVASC) 5 mg tablet 90 Tab 0     Sig: take 1 tablet by mouth once daily, needs appt       Pt to be seen in office on  Thursday, November 14, 2019 10:15 AM    Pharmacy on file verified  [facility] [phone])

## 2019-10-18 RX ORDER — AMLODIPINE BESYLATE 5 MG/1
TABLET ORAL
Qty: 90 TAB | Refills: 0 | Status: CANCELLED | OUTPATIENT
Start: 2019-10-18

## 2019-10-18 NOTE — TELEPHONE ENCOUNTER
LOV 2/26/19  UOV 11/14/19  Medication filled 10/17/19     Called patient to explain that medication had been filled yesterday.  Patient has already picked it up

## 2019-10-18 NOTE — TELEPHONE ENCOUNTER
Pt. is calling requesting a refill on the following meds, Pharm on file verified.      LOV 02/26/2019  Last refill 10/17/2019    Requested Prescriptions     Pending Prescriptions Disp Refills    amLODIPine (NORVASC) 5 mg tablet 90 Tab 0     Sig: take 1 tablet by mouth once daily, needs appt

## 2019-11-14 ENCOUNTER — HOSPITAL ENCOUNTER (OUTPATIENT)
Dept: LAB | Age: 74
Discharge: HOME | End: 2019-11-14
Payer: MEDICARE

## 2019-11-14 ENCOUNTER — OFFICE VISIT (OUTPATIENT)
Dept: FAMILY MEDICINE CLINIC | Age: 74
End: 2019-11-14

## 2019-11-14 VITALS
HEART RATE: 66 BPM | HEIGHT: 62 IN | WEIGHT: 145 LBS | RESPIRATION RATE: 19 BRPM | BODY MASS INDEX: 26.68 KG/M2 | OXYGEN SATURATION: 100 % | DIASTOLIC BLOOD PRESSURE: 78 MMHG | SYSTOLIC BLOOD PRESSURE: 142 MMHG | TEMPERATURE: 97.7 F

## 2019-11-14 DIAGNOSIS — E28.39 ESTROGEN DEFICIENCY: ICD-10-CM

## 2019-11-14 DIAGNOSIS — Z12.11 COLON CANCER SCREENING: ICD-10-CM

## 2019-11-14 DIAGNOSIS — R73.02 IMPAIRED GLUCOSE TOLERANCE: ICD-10-CM

## 2019-11-14 DIAGNOSIS — E78.5 DYSLIPIDEMIA (HIGH LDL; LOW HDL): ICD-10-CM

## 2019-11-14 DIAGNOSIS — M25.561 ACUTE PAIN OF RIGHT KNEE: ICD-10-CM

## 2019-11-14 DIAGNOSIS — Z00.00 MEDICARE ANNUAL WELLNESS VISIT, SUBSEQUENT: Primary | ICD-10-CM

## 2019-11-14 DIAGNOSIS — Z23 ENCOUNTER FOR IMMUNIZATION: ICD-10-CM

## 2019-11-14 DIAGNOSIS — M70.50 PES ANSERINE BURSITIS: ICD-10-CM

## 2019-11-14 DIAGNOSIS — L57.0 ACTINIC KERATOSES: ICD-10-CM

## 2019-11-14 DIAGNOSIS — I10 BENIGN ESSENTIAL HYPERTENSION: ICD-10-CM

## 2019-11-14 PROCEDURE — 80053 COMPREHEN METABOLIC PANEL: CPT

## 2019-11-14 PROCEDURE — 85025 COMPLETE CBC W/AUTO DIFF WBC: CPT

## 2019-11-14 PROCEDURE — 80061 LIPID PANEL: CPT

## 2019-11-14 PROCEDURE — 83036 HEMOGLOBIN GLYCOSYLATED A1C: CPT

## 2019-11-14 RX ORDER — TIZANIDINE 2 MG/1
TABLET ORAL
Qty: 30 TAB | Refills: 2 | Status: SHIPPED | OUTPATIENT
Start: 2019-11-14 | End: 2019-12-17 | Stop reason: SDUPTHER

## 2019-11-14 RX ORDER — LORATADINE 10 MG/1
10 TABLET ORAL
COMMUNITY

## 2019-11-14 RX ORDER — TRIAMCINOLONE ACETONIDE 55 UG/1
2 SPRAY, METERED NASAL DAILY
COMMUNITY
Start: 2019-11-14 | End: 2020-07-28

## 2019-11-14 NOTE — PATIENT INSTRUCTIONS
For help with making an advanced directive (living will), please call our nurse navigator Deann Jeans, RN, at [phone]    To schedule the bone density test, please call:  [phone] [facility] scheduling)         Well Visit, Over 72: Care Instructions  Your Care Instructions    Physical exams can help you stay healthy. Your doctor has checked your overall health and may have suggested ways to take good care of yourself. He or she also may have recommended tests. At home, you can help prevent illness with healthy eating, regular exercise, and other steps. Follow-up care is a key part of your treatment and safety. Be sure to make and go to all appointments, and call your doctor if you are having problems. It's also a good idea to know your test results and keep a list of the medicines you take. How can you care for yourself at home? · Reach and stay at a healthy weight. This will lower your risk for many problems, such as obesity, diabetes, heart disease, and high blood pressure. · Get at least 30 minutes of exercise on most days of the week. Walking is a good choice. You also may want to do other activities, such as running, swimming, cycling, or playing tennis or team sports. · Do not smoke. Smoking can make health problems worse. If you need help quitting, talk to your doctor about stop-smoking programs and medicines. These can increase your chances of quitting for good. · Protect your skin from too much sun. When you're outdoors from 10 a.m. to 4 p.m., stay in the shade or cover up with clothing and a hat with a wide brim. Wear sunglasses that block UV rays. Even when it's cloudy, put broad-spectrum sunscreen (SPF 30 or higher) on any exposed skin. · See a dentist one or two times a year for checkups and to have your teeth cleaned. · Wear a seat belt in the car. Follow your doctor's advice about when to have certain tests. These tests can spot problems early. For men and women  · Cholesterol.  Your doctor will tell you how often to have this done based on your overall health and other things that can increase your risk for heart attack and stroke. · Blood pressure. Have your blood pressure checked during a routine doctor visit. Your doctor will tell you how often to check your blood pressure based on your age, your blood pressure results, and other factors. · Diabetes. Ask your doctor whether you should have tests for diabetes. · Vision. Experts recommend that you have yearly exams for glaucoma and other age-related eye problems. · Hearing. Tell your doctor if you notice any change in your hearing. You can have tests to find out how well you hear. · Colon cancer tests. Keep having colon cancer tests as your doctor recommends. You can have one of several types of tests. · Heart attack and stroke risk. At least every 4 to 6 years, you should have your risk for heart attack and stroke assessed. Your doctor uses factors such as your age, blood pressure, cholesterol, and whether you smoke or have diabetes to show what your risk for a heart attack or stroke is over the next 10 years. · Osteoporosis. Talk to your doctor about whether you should have a bone density test to find out whether you have thinning bones. Also ask your doctor about whether you should take calcium and vitamin D supplements. For women  · Pap test and pelvic exam. You may no longer need a Pap test. Talk with your doctor about whether to stop or continue to have Pap tests. · Breast exam and mammogram. Ask how often you should have a mammogram, which is an X-ray of your breasts. A mammogram can spot breast cancer before it can be felt and when it is easiest to treat. · Thyroid disease. Talk to your doctor about whether to have your thyroid checked as part of a regular physical exam. Women have an increased chance of a thyroid problem.   For men  · Prostate exam. Talk to your doctor about whether you should have a blood test (called a PSA test) for prostate cancer. Experts recommend that you discuss the benefits and risks of the test with your doctor before you decide whether to have this test. Some experts say that men ages 79 and older no longer need testing. · Abdominal aortic aneurysm. Ask your doctor whether you should have a test to check for an aneurysm. You may need a test if you ever smoked or if your parent, brother, sister, or child has had an aneurysm. When should you call for help? Watch closely for changes in your health, and be sure to contact your doctor if you have any problems or symptoms that concern you. Where can you learn more? Go to http://min-lisbeth.info/. Enter B900 in the search box to learn more about \"Well Visit, Over 65: Care Instructions. \"  Current as of: December 13, 2018  Content Version: 12.2  © 6412-7763 [employer]. Care instructions adapted under license by [employer] (which disclaims liability or warranty for this information). If you have questions about a medical condition or this instruction, always ask your healthcare professional. [place] [address] any warranty or liability for your use of this information. Knee (Pes Anserine) Bursitis: Exercises  Introduction  Here are some examples of exercises for you to try. The exercises may be suggested for a condition or for rehabilitation. Start each exercise slowly. Ease off the exercises if you start to have pain. You will be told when to start these exercises and which ones will work best for you. How to do the exercises  Heel slide    1. Lie on your back with your affected knee straight. Your good knee should be bent. 2. Bend your affected knee by sliding your heel across the floor and toward your buttock until you feel a gentle stretch in your knee. 3. Hold for about 6 seconds, and then slowly straighten your knee. 4. Repeat 8 to 12 times. Quad sets    1.  Sit with your affected leg straight and supported on the floor or a firm bed. Place a small, rolled-up towel under your affected knee. Your other leg should be bent, with that foot flat on the floor. 2. Tighten the thigh muscles of your affected leg by pressing the back of your knee down into the towel. 3. Hold for about 6 seconds, then rest for up to 10 seconds. 4. Repeat 8 to 12 times. Straight-leg raises to the front    1. Lie on your back with your good knee bent so that your foot rests flat on the floor. Your affected leg should be straight. Make sure that your low back has a normal curve. You should be able to slip your hand in between the floor and the small of your back, with your palm touching the floor and your back touching the back of your hand. 2. Tighten the thigh muscles in your affected leg by pressing the back of your knee flat down to the floor. Hold your knee straight. 3. Keeping the thigh muscles tight and your leg straight, lift your affected leg up so that your heel is about 12 inches off the floor. 4. Hold for about 6 seconds, then lower slowly. Rest for up to 10 seconds between repetitions. 5. Repeat 8 to 12 times. Follow-up care is a key part of your treatment and safety. Be sure to make and go to all appointments, and call your doctor if you are having problems. It's also a good idea to know your test results and keep a list of the medicines you take. Where can you learn more? Go to http://min-lisbeth.info/. Enter I441 in the search box to learn more about \"Knee (Pes Anserine) Bursitis: Exercises. \"  Current as of: June 26, 2019  Content Version: 12.2  © 2540-2957 [employer]. Care instructions adapted under license by [employer] (which disclaims liability or warranty for this information).  If you have questions about a medical condition or this instruction, always ask your healthcare professional. S[employer] disclaims any warranty or liability for your use of this information.

## 2019-11-14 NOTE — PROGRESS NOTES
[facility] [address] [facility]. [facility], [address]  [phone]           Date of visit: 11/14/2019       This is an Subsequent Medicare Annual Wellness Visit (AWV), (Performed more than 12 months after effective date of Medicare Part B enrollment and 12 months after last preventive visit)    I have reviewed the patient's medical history in detail and updated the computerized patient record. Gina Tavares is a 69 y.o. female   History obtained from: the patient. Concerns today   Feeling well except for right knee  Wrenched it doing yard work. Has been to [facility] in the past and wants recommendation for who to see there. Has been weeks since it happened. Was using a wheelbarrow, when she turned it twisted. Didn't hear or feel a pop. Did not hurt immediately.   Is sore, achy  Not severe, but bothers her  Going up and down steps or running errands is hard on it  Taking advil, 3x per day, it does help    Willing to do labs here  Never took the statin, didn't want to      Blood pressure high here, she doesn't check it at home  Was good at another doctors office a couple of times 130s/80s    Weight stable    ent told her sinus, take 2 claritin nightly, then went down on dose and use nasal spray    -history of BPPV, but denies dizziness recently      History     Patient Active Problem List   Diagnosis Code    Dyslipidemia (high LDL; low HDL) E78.5    Benign essential hypertension I10    Needle phobia F40.298    Female genital prolapse N81.9    Impaired glucose tolerance R73.02    BMI 26.0-26.9,adult Z68.26     Past Medical History:   Diagnosis Date    Benign essential hypertension 10/10/2016    Colonoscopy & Mammogram refused 10/10/2016    Dyslipidemia (high LDL; low HDL)     Full dentures     History of tobacco use       Past Surgical History:   Procedure Laterality Date    ABDOMEN SURGERY PROC UNLISTED      HX GYN      D&C about 14    HX TYMPANOSTOMY Right ~    Dr Jona Yung     Allergies   Allergen Reactions    Ana DIANNE Itching     Current Outpatient Medications   Medication Sig Dispense Refill    loratadine (CLARITIN) 10 mg tablet Take 10 mg by mouth.  tiZANidine (ZANAFLEX) 2 mg tablet take 1 tablet by mouth three times a day if needed for muscle spasm 30 Tab 2    triamcinolone (NASACORT) 55 mcg nasal inhaler 2 Sprays by Both Nostrils route daily.  varicella-zoster recombinant, PF, (SHINGRIX, PF,) 50 mcg/0.5 mL susr injection 0.5 mL by IntraMUSCular route once for 1 dose. 0.5 mL 1    valsartan-hydroCHLOROthiazide (DIOVAN-HCT) 160-25 mg per tablet take 1 tablet by mouth once daily, needs appt 90 Tab 0    amLODIPine (NORVASC) 5 mg tablet take 1 tablet by mouth once daily, needs appt 90 Tab 0    triamcinolone acetonide (KENALOG) 0.1 % ointment Apply  to affected area two (2) times a day. use thin layer 30 g 1    CHOLECALCIFEROL, VITAMIN D3, (VITAMIN D3 PO) Take 2,000 Units by mouth daily.  DOCOSAHEXANOIC ACID/EPA (FISH OIL PO) Take 1,000 mg by mouth daily.  aspirin 81 mg tablet Take 81 mg by mouth daily.  Indications: MYOCARDIAL INFARCTION PREVENTION       Family History   Problem Relation Age of Onset   Pitt Breast Cancer Mother          age 48    Heart Disease Father     Hypertension Father      Social History     Tobacco Use    Smoking status: Former Smoker     Packs/day: 1.00     Years: 25.00     Pack years: 25.00     Last attempt to quit: 2008     Years since quitting: 10.9    Smokeless tobacco: Never Used   Substance Use Topics    Alcohol use: No       Specialists/Care Team   Genna Loredo has established care with the following healthcare providers:  Patient Care Team:  Jonathan Vela MD as PCP - General (Family Practice)  Jonathan Vela MD as PCP - [facility] Empaneled Provider  Hortencia Pollock MD (Otolaryngology)  John English MD (Ophthalmology)  Has dermatologist    Health Risk Assessment     Demographics   female  69 y.o. General Health Questions   -During the past 4 weeks:   -how would you rate your health in general? Good   -how often have you been bothered by feeling dizzy when standing up? no   -how much have you been bothered by bodily pain? Mildly in shoulders, also this right knee   -Have you noticed any hearing difficulties? no   -has your physical and emotional health limited your social activities with family or friends? no    Emotional Health Questions   -Do you have a history of depression, anxiety, or emotional problems? no  3 most recent PHQ Screens 11/14/2019   PHQ Not Done -   Little interest or pleasure in doing things Not at all   Feeling down, depressed, irritable, or hopeless Not at all   Total Score PHQ 2 0     Health Habits   Please describe your diet habits: they eat very well, plenty of veggies, beef only about once per week, mostly chicken and fish, drinks water  Do you get 5 servings of fruits or vegetables daily? yes  Do you exercise regularly? Yes, treadmill    Activities of Daily Living and Functional Status   -Do you need help with eating, walking, dressing, bathing, toileting, the phone, transportation, shopping, preparing meals, housework, laundry, medications or managing money? no  -In the past four weeks, was someone available to help you if you needed and wanted help with anything? yes  -Are you confident are you that you can control and manage most of your health problems? yes  -Have you been given information to help you keep track of your medications? yes  -How often do you have trouble taking your medications as prescribed? never    Fall Risk and Home Safety   Have you fallen 2 or more times in the past year? no  Does your home have rugs in the hallway, lack grab bars in the bathroom, lack handrails on the stairs or have poor lighting?  Doesn't currently have grabbar, fell off, going to get another, though  Do you have smoke detectors and check them regularly? yes  Do you have difficulties driving a car? no  Do you always fasten your seat belt when you are in a car? yes    Review of Systems (if indicated for problems addressed today)   Card: denies chest pain  Pulm: denies shortness of breath    Physical Examination     Vitals:    11/14/19 0841 11/14/19 0929   BP: 172/77 142/78   Pulse: 66    Resp: 19    Temp: 97.7 °F (36.5 °C)    TempSrc: Oral    SpO2: 100%    Weight: 145 lb (65.8 kg)    Height: 5' 2\" (1.575 m)      Body mass index is 26.52 kg/m². No exam data present  Was the patient's timed Up & Go test unsteady or longer than 30 seconds? no    Evaluation of Cognitive Function   Mood/affect:  neutral  Orientation: normal  Appearance: age appropriate  Family member/caregiver input: none    Additional exam if indicated for problems addressed today:  General: stated age, well-developed, and in NAD  Eyes: PERRL, EOMI, no redness or drainage  Nose: no drainage  Mouth: no lesions  Throat: no erythema, exudate or swelling  Neck: supple, symmetrical, trachea midline, no adenopathy and thyroid: not enlarged, symmetric, no tenderness/mass/nodules  Lungs:  clear to auscultation w/o rales, rhonchi, wheezes w/normal effort and no use of accessory muscles of respiration   Heart: regular rate and rhythm, S1, S2 normal, no murmur, click, rub or gallop  Abdomen: soft, nontender, no masses  Ext:  No edema noted.    MSK: right knee tender only over pes anserine bursa, little swelling there, no jointline tenderness, no instability, normal ROM  Lymph: no cervical adenopathy appreciated  Skin:  Normal. and no rash or abnormalities other than few scaly erythematous macules on upper anterior chest  Neuro: normal gait, CN 2-12 intact  Psych: alert and oriented to person, place, time and situation and Speech: appropriate quality, quantity and organization of sentences and normal affect  Advice/Referrals/Counseling (as indicated)   Education and counseling provided for any problems identified above: continued healthy diet/exercise encouraged    Preventive Services     Health Maintenance   Topic Date Due    Shingrix Vaccine Age 49> (1 of 2) 03/19/1997    GLAUCOMA SCREENING Q2Y  03/19/2012    Bone Densitometry (Dexa) Screening  03/19/2012    FOBT Q 1 YEAR AGE 50-75  11/05/2015    MEDICARE YEARLY EXAM  11/07/2019    BREAST CANCER SCRN MAMMOGRAM  02/26/2021 (Originally 10/10/2018)    DTaP/Tdap/Td series (2 - Td) 11/05/2024    Influenza Age 5 to Adult  Completed    Pneumococcal 65+ years  Completed    Hepatitis C Screening  Addressed      (Preventive care checklist to be included in patient instructions)  Discussed today Done Previously Not Needed    X no indication for 13 X 23 done  Pneumococcal vaccines   X today   Flu vaccine     x Hepatitis B vaccine (if at risk)   X rx given   Shingles vaccine    X 2014  TDAP vaccine   X declines   Mammogram     x Pap smear   X FOBT rx given today   Colorectal cancer screening     x Low-dose CT for lung cancer screening   X refer   Bone density test   X recently after stye   Glaucoma screening   x   Cholesterol test   x   Diabetes screening test      x Diabetes self-management class     x Nutritionist referral for diabetes or renal disease     Discussion of Advance Directive   Discussed with Sonia De Guzman her ability to prepare and advance directive in the case that an injury or illness causes her to be unable to make health care decisions. Gave her honoring choices packet in the past, has not done yet, will give her    Assessment/Plan   Z00.00    ICD-10-CM ICD-9-CM    1. Medicare annual wellness visit, subsequent Z00.00 V70.0    2. Benign essential hypertension I10 401.1 CBC WITH AUTOMATED DIFF      METABOLIC PANEL, COMPREHENSIVE   3. Dyslipidemia (high LDL; low HDL) E78.5 272.4 LIPID PANEL   4. Acute pain of right knee M25.561 719.46 REFERRAL TO ORTHOPEDICS      CANCELED: REFERRAL TO ORTHOPEDICS   5.  Impaired glucose tolerance R73.02 790.22 HEMOGLOBIN A1C WITH EAG   6. Actinic keratoses L57.0 702.0    7. BMI 26.0-26.9,adult Z68.26 V85.22    8. Encounter for immunization Z23 V03.89 INFLUENZA VACCINE INACTIVATED (IIV), SUBUNIT, ADJUVANTED, IM      ADMIN INFLUENZA VIRUS VAC   9. Estrogen deficiency E28.39 256.39 DEXA BONE DENSITY STUDY AXIAL   10. Colon cancer screening Z12.11 V76.51 OCCULT BLOOD IMMUNOASSAY,DIAGNOSTIC   11. Pes anserine bursitis M70.50 726.61 REFERRAL TO ORTHOPEDICS      CANCELED: REFERRAL TO ORTHOPEDICS       Orders Placed This Encounter    DEXA BONE DENSITY STUDY AXIAL    INFLUENZA VACCINE INACTIVATED (IIV), SUBUNIT, ADJUVANTED, IM    CBC WITH AUTOMATED DIFF    METABOLIC PANEL, COMPREHENSIVE    LIPID PANEL    HEMOGLOBIN A1C WITH EAG    OCCULT BLOOD IMMUNOASSAY,DIAGNOSTIC    [facility]    ADMIN INFLUENZA VIRUS VAC    loratadine (CLARITIN) 10 mg tablet    tiZANidine (ZANAFLEX) 2 mg tablet    triamcinolone (NASACORT) 55 mcg nasal inhaler    varicella-zoster recombinant, PF, (SHINGRIX, PF,) 50 mcg/0.5 mL susr injection     Preventive care as above, willing to do DEXA this year which I think is important for her; fairly high risk with race/size/history smoking  Reviewed shots she can get at pharmacy  Labs for chronic problems  Tolerating meds and bp controlled, no med changes  Very healthy weight and lifestyle, encouraged her to keep up the good work! Right knee seems to be pes anserine bursitis, she wants to see ortho, will refer, also gave her exercises to do  Advised to see derm soon due to some AKs on her chest  Allergies doing better  Overall problems stable  bp a bit high but probably due to taking motrin for knee. Usually well-controlled    Follow-up and Dispositions    · Return in about 6 months (around 5/14/2020) for Follow up.          Madeleine Thompson MD

## 2019-11-14 NOTE — PROGRESS NOTES
Chief Complaint   Patient presents with   [address] [place] [address] Annual Wellness Visit     1. Have you been to the ER, urgent care clinic since your last visit? Hospitalized since your last visit? [facility] 9/12/19 right eye redness and itching     2. Have you seen or consulted any other health care providers outside of the [address] [place] [address] since your last visit? Include any pap smears or colon screening.  Saw ENT Dr. Roxana Wood, also saw eye doctor at [facility] for right eye redness/itching

## 2019-11-15 LAB
ALBUMIN SERPL-MCNC: 4.6 G/DL (ref 3.5–4.8)
ALBUMIN/GLOB SERPL: 2.1 {RATIO} (ref 1.2–2.2)
ALP SERPL-CCNC: 98 IU/L (ref 39–117)
ALT SERPL-CCNC: 25 IU/L (ref 0–32)
AST SERPL-CCNC: 20 IU/L (ref 0–40)
BASOPHILS # BLD AUTO: 0.1 X10E3/UL (ref 0–0.2)
BASOPHILS NFR BLD AUTO: 1 %
BILIRUB SERPL-MCNC: 0.5 MG/DL (ref 0–1.2)
BUN SERPL-MCNC: 12 MG/DL (ref 8–27)
BUN/CREAT SERPL: 16 (ref 12–28)
CALCIUM SERPL-MCNC: 9.5 MG/DL (ref 8.7–10.3)
CHLORIDE SERPL-SCNC: 98 MMOL/L (ref 96–106)
CHOLEST SERPL-MCNC: 199 MG/DL (ref 100–199)
CO2 SERPL-SCNC: 24 MMOL/L (ref 20–29)
CREAT SERPL-MCNC: 0.73 MG/DL (ref 0.57–1)
EOSINOPHIL # BLD AUTO: 0.2 X10E3/UL (ref 0–0.4)
EOSINOPHIL NFR BLD AUTO: 4 %
ERYTHROCYTE [DISTWIDTH] IN BLOOD BY AUTOMATED COUNT: 12.8 % (ref 12.3–15.4)
EST. AVERAGE GLUCOSE BLD GHB EST-MCNC: 117 MG/DL
GLOBULIN SER CALC-MCNC: 2.2 G/DL (ref 1.5–4.5)
GLUCOSE SERPL-MCNC: 97 MG/DL (ref 65–99)
HBA1C MFR BLD: 5.7 % (ref 4.8–5.6)
HCT VFR BLD AUTO: 36.7 % (ref 34–46.6)
HDLC SERPL-MCNC: 47 MG/DL
HGB BLD-MCNC: 12.3 G/DL (ref 11.1–15.9)
IMM GRANULOCYTES # BLD AUTO: 0 X10E3/UL (ref 0–0.1)
IMM GRANULOCYTES NFR BLD AUTO: 0 %
INTERPRETATION, 910389: NORMAL
LDLC SERPL CALC-MCNC: 123 MG/DL (ref 0–99)
LYMPHOCYTES # BLD AUTO: 0.9 X10E3/UL (ref 0.7–3.1)
LYMPHOCYTES NFR BLD AUTO: 19 %
MCH RBC QN AUTO: 28.9 PG (ref 26.6–33)
MCHC RBC AUTO-ENTMCNC: 33.5 G/DL (ref 31.5–35.7)
MCV RBC AUTO: 86 FL (ref 79–97)
MONOCYTES # BLD AUTO: 0.3 X10E3/UL (ref 0.1–0.9)
MONOCYTES NFR BLD AUTO: 8 %
NEUTROPHILS # BLD AUTO: 3 X10E3/UL (ref 1.4–7)
NEUTROPHILS NFR BLD AUTO: 68 %
PLATELET # BLD AUTO: 254 X10E3/UL (ref 150–450)
POTASSIUM SERPL-SCNC: 4.2 MMOL/L (ref 3.5–5.2)
PROT SERPL-MCNC: 6.8 G/DL (ref 6–8.5)
RBC # BLD AUTO: 4.25 X10E6/UL (ref 3.77–5.28)
SODIUM SERPL-SCNC: 137 MMOL/L (ref 134–144)
TRIGL SERPL-MCNC: 144 MG/DL (ref 0–149)
VLDLC SERPL CALC-MCNC: 29 MG/DL (ref 5–40)
WBC # BLD AUTO: 4.4 X10E3/UL (ref 3.4–10.8)

## 2019-12-17 RX ORDER — TIZANIDINE 2 MG/1
TABLET ORAL
Qty: 30 TAB | Refills: 2 | Status: SHIPPED | OUTPATIENT
Start: 2019-12-17 | End: 2020-06-08

## 2019-12-17 NOTE — TELEPHONE ENCOUNTER
PCP: Stu Zabala MD    Last appt: 11/14/2019  Future Appointments   Date Time Provider Brittny Dumont   12/23/2019  2:00  [facility]       Requested Prescriptions     Pending Prescriptions Disp Refills    tiZANidine (ZANAFLEX) 2 mg tablet 30 Tab 2     Sig: take 1 tablet by mouth three times a day if needed for muscle spasm

## 2019-12-17 NOTE — TELEPHONE ENCOUNTER
Pharm on file verified. They are requesting a refill on the following meds.      Requested Prescriptions     Pending Prescriptions Disp Refills    tiZANidine (ZANAFLEX) 2 mg tablet 30 Tab 2     Sig: take 1 tablet by mouth three times a day if needed for muscle spasm

## 2019-12-23 ENCOUNTER — HOSPITAL ENCOUNTER (OUTPATIENT)
Dept: MAMMOGRAPHY | Age: 74
Discharge: HOME | End: 2019-12-23
Attending: FAMILY MEDICINE
Payer: MEDICARE

## 2019-12-23 ENCOUNTER — PATIENT MESSAGE (OUTPATIENT)
Dept: FAMILY MEDICINE CLINIC | Age: 74
End: 2019-12-23

## 2019-12-23 DIAGNOSIS — E28.39 ESTROGEN DEFICIENCY: ICD-10-CM

## 2019-12-23 PROBLEM — M85.89 OSTEOPENIA OF MULTIPLE SITES: Status: ACTIVE | Noted: 2019-12-23

## 2019-12-23 PROCEDURE — 77080 DXA BONE DENSITY AXIAL: CPT

## 2020-05-13 ENCOUNTER — TELEPHONE (OUTPATIENT)
Dept: FAMILY MEDICINE CLINIC | Age: 75
End: 2020-05-13

## 2020-05-13 RX ORDER — VALSARTAN 160 MG/1
160 TABLET ORAL DAILY
Qty: 90 TAB | Refills: 0 | Status: SHIPPED | OUTPATIENT
Start: 2020-05-13 | End: 2020-08-21 | Stop reason: SDUPTHER

## 2020-05-13 RX ORDER — HYDROCHLOROTHIAZIDE 25 MG/1
25 TABLET ORAL DAILY
Qty: 90 TAB | Refills: 0 | Status: SHIPPED | OUTPATIENT
Start: 2020-05-13 | End: 2020-08-21 | Stop reason: SDUPTHER

## 2020-05-13 NOTE — TELEPHONE ENCOUNTER
Pharmacy is requesting a call back, they have issues with the medication, which is on back order   valsartan-hydroCHLOROthiazide (DIOVAN-HCT) 160-25 mg per tablet [[id number]]     They have two separate ones same dosage they want to ask Dr Lo Maloney if that's okay.   Requesting new Rx to be sent   BCB# [phone]

## 2020-05-13 NOTE — TELEPHONE ENCOUNTER
Pharmacy is calling again to check for the Rx for the medication mentioned below. Pt needs a refill asap. Please advice/call the pharmacy.     Cain Ibarra

## 2020-06-08 RX ORDER — TIZANIDINE 2 MG/1
TABLET ORAL
Qty: 30 TAB | Refills: 2 | Status: SHIPPED | OUTPATIENT
Start: 2020-06-08 | End: 2020-07-28

## 2020-07-08 LAB
SARS-COV-2, NAA: NEGATIVE
SARS-COV-2, NAA: NOT DETECTED

## 2020-07-28 ENCOUNTER — VIRTUAL VISIT (OUTPATIENT)
Dept: FAMILY MEDICINE CLINIC | Age: 75
End: 2020-07-28

## 2020-07-28 DIAGNOSIS — I10 BENIGN ESSENTIAL HYPERTENSION: ICD-10-CM

## 2020-07-28 DIAGNOSIS — E87.6 HYPOKALEMIA: ICD-10-CM

## 2020-07-28 DIAGNOSIS — N39.0 RECURRENT UTI: ICD-10-CM

## 2020-07-28 DIAGNOSIS — R25.2 LEG CRAMPS: Primary | ICD-10-CM

## 2020-07-28 RX ORDER — LANOLIN ALCOHOL/MO/W.PET/CERES
400 CREAM (GRAM) TOPICAL
COMMUNITY
Start: 2020-07-28 | End: 2021-06-21 | Stop reason: ALTCHOICE

## 2020-07-28 NOTE — PROGRESS NOTES
[facility]y Salgado [facility]  [place], [address]  [phone]             Date of visit: 7/28/2020   Subjective:      History obtained from:  the patient. Travis Workman is a 70 y.o. female who presents today for   Chief Complaint   Patient presents with    Spasms     This service was provided through telehealth (doxy. me real-time video/audio) due to COVID-19 pandemic, with the patient being at home and the provider being at [facility] in [place], [place]. Others assisting in the telehealth encounter included none. 19 minutes were spent with the patient by the provider. she  and/or her healthcare decision maker is aware that this patient-initiated Telehealth encounter is a billable service, with coverage as determined by her insurance carrier. she  is aware that she  may receive a bill and has provided verbal consent to proceed: Yes, per PSR.     Recently had hysterectomy 3 weeks ago for prolapse  Did well with surgery  However had 3 bladder infections  One was all blood, had to go to [facility], Dr. Camille Mckeon saw her and put her on high dose bactrim, made her sick (that was given 5/9/20)  Then when she went back in they put her on something but that didn't do much (macrobid)  Then for 3rd infection saw Dr. Dinora Sanchez with [facility] (cefuroxime 500 TID given on 7/17)    Past 2 nights has had terrible leg cramps  Has compression stockings but wondering if she should wero them  Has had cramps occasionally for years but really bad the past 2 nights  Getting up to go to the bathroom often and then the leg cramps make it hard to sleep    Had covid PCR test and then a rapid test, all were negative    Going to see urogyn again on 8/11  Still leaking urine and going frequently since the surgery but hoping more time will heal  Glad she no longer has the pressure of her uterus falling out    On hctz, amlodipine, valsartan for HTN  bp was great during hospitalizations  meds weren't changed other than valsaran held for 1 day      Patient Active Problem List    Diagnosis Date Noted    Osteopenia of multiple sites 2019    BMI 26.0-26.9,adult 2018    Impaired glucose tolerance 10/09/2017    Needle phobia 2017    Female genital prolapse 2017    Benign essential hypertension 10/10/2016    Dyslipidemia (high LDL; low HDL)      Current Outpatient Medications   Medication Sig Dispense Refill    magnesium oxide (MAG-OX) 400 mg tablet Take 1 Tab by mouth nightly as needed.  valsartan (DIOVAN) 160 mg tablet Take 1 Tab by mouth daily. 90 Tab 0    hydroCHLOROthiazide (HYDRODIURIL) 25 mg tablet Take 1 Tab by mouth daily. 90 Tab 0    amLODIPine (NORVASC) 5 mg tablet TAKE 1 TABLET BY MOUTH EVERY DAY 90 Tab 1    loratadine (CLARITIN) 10 mg tablet Take 10 mg by mouth.  CHOLECALCIFEROL, VITAMIN D3, (VITAMIN D3 PO) Take 2,000 Units by mouth daily.  DOCOSAHEXANOIC ACID/EPA (FISH OIL PO) Take 1,000 mg by mouth daily.        Allergies   Allergen Reactions    Pen-Vee K Itching     Past Medical History:   Diagnosis Date    Benign essential hypertension 10/10/2016    Colonoscopy & Mammogram refused 10/10/2016    Dyslipidemia (high LDL; low HDL)     Full dentures     History of tobacco use      Past Surgical History:   Procedure Laterality Date    ABDOMEN SURGERY PROC UNLISTED      HX GYN      D&C about 14    HX HYSTERECTOMY  2020    for prolapse    HX TYMPANOSTOMY Right ~    Dr Hung Osorio     Family History   Problem Relation Age of Onset   [de-identified] Breast Cancer Mother          age 48    Heart Disease Father     Hypertension Father      Social History     Tobacco Use    Smoking status: Former Smoker     Packs/day: 1.00     Years: 25.00     Pack years: 25.00     Last attempt to quit: 2008     Years since quittin.6    Smokeless tobacco: Never Used   Substance Use Topics    Alcohol use: No      Social History Social History Narrative    Not on file        Review of Systems  Gen: denies fever  pulm denies cough      Objective: There were no vitals filed for this visit. There is no height or weight on file to calculate BMI. General: stated age, well developed, well nourished and in NAD  Skin:  No lesions noted on video  Psych: alert and oriented to person, place, time and situation and Speech: appropriate quality, quantity and organization of sentences     Assessment/Plan:       ICD-10-CM ICD-9-CM    1. Leg cramps  R25.2 729.82    2. Benign essential hypertension  I10 401.1    3. Hypokalemia  E87.6 276.8    4. Recurrent UTI  N39.0 599.0         Orders Placed This Encounter    magnesium oxide (MAG-OX) 400 mg tablet       Cramps chronic  But just much worse past 2 nights, severe, keeping her awake  Suspect due to electrolyte disturbances from recent surgery, 3 recent UTIs with antibiotics, going out in the heat last 2 days for a little exercise. She doesn't want more blood tests now due to having so many recently  Told her ok to try the magnesium, let me know if that doesn't help after a few nights  We could consider adding potassium; her level was just slightly low the day after her surgery (and when she had skipped her valsartan for a day) but has always been very normal here. However, if adding potassium would want her to check her labs a few days later. Thankfully the UTI symptoms are finally better after 3rd abx and she will follow up with urogyn in about 2 weeks    Asked her to keep me posted on the portal    Discussed the diagnosis and plan and she expressed understanding. F/u Nov AWV  Follow-up and Dispositions    · Return in about 4 months (around 11/28/2020) for Annual Wellness Visit.          Monserrat Stewart MD

## 2020-07-28 NOTE — PATIENT INSTRUCTIONS
Muscle Cramps: Care Instructions Your Care Instructions A muscle cramp occurs when a muscle tightens up suddenly. A cramp often happens in the legs. A muscle cramp is also called a muscle spasm or a charley horse. Muscle cramps usually last less than a minute. However, the pain may last for several minutes. Leg cramps that occur at night may wake you up. Heavy exercise, dehydration, and being overweight can increase your risk of getting cramps. An imbalance of certain chemicals in your blood, called electrolytes, can also lead to muscle cramps. Pregnant women sometimes get muscle cramps during sleep. Muscle cramps can be treated by stretching and massaging the muscle. If cramps keep coming back, your doctor may prescribe medicine that relaxes your muscles. Follow-up care is a key part of your treatment and safety. Be sure to make and go to all appointments, and call your doctor if you are having problems. It's also a good idea to know your test results and keep a list of the medicines you take. How can you care for yourself at home? · Drink plenty of fluids to prevent dehydration. Choose water and other caffeine-free clear liquids until you feel better. If you have kidney, heart, or liver disease and have to limit fluids, talk with your doctor before you increase the amount of fluids you drink. · Stretch your muscles every day, especially before and after exercise and at bedtime. Regular stretching can relax your muscles and may prevent cramps. · Do not suddenly increase the amount of exercise you get. Increase your exercise a little each week. · When you get a cramp, stretch and massage the muscle. You can also take a warm shower or bath to relax the muscle. A heating pad placed on the muscle can also help. · Take a daily multivitamin supplement.  
· Ask your doctor if you can take an over-the-counter pain medicine, such as acetaminophen (Tylenol), ibuprofen (Advil, Motrin), or naproxen (Aleve). Be safe with medicines. Read and follow all instructions on the label. When should you call for help? Watch closely for changes in your health, and be sure to contact your doctor if: 
· You get muscle cramps often that do not go away after home treatment. · Your muscle cramps often wake you up at night. · You do not get better as expected. Where can you learn more? Go to http://min-lisbeth.info/ Enter R034 in the search box to learn more about \"Muscle Cramps: Care Instructions. \" Current as of: March 2, 2020               Content Version: 12.5 © 0418-9997 Healthwise, [employer]. Care instructions adapted under license by [employer] (which disclaims liability or warranty for this information). If you have questions about a medical condition or this instruction, always ask your healthcare professional. [address] any warranty or liability for your use of this information.

## 2020-11-10 ENCOUNTER — PATIENT MESSAGE (OUTPATIENT)
Dept: FAMILY MEDICINE CLINIC | Age: 75
End: 2020-11-10

## 2020-11-23 NOTE — PATIENT INSTRUCTIONS
Well Visit, Over 72: Care Instructions Your Care Instructions Physical exams can help you stay healthy. Your doctor has checked your overall health and may have suggested ways to take good care of yourself. He or she also may have recommended tests. At home, you can help prevent illness with healthy eating, regular exercise, and other steps. Follow-up care is a key part of your treatment and safety. Be sure to make and go to all appointments, and call your doctor if you are having problems. It's also a good idea to know your test results and keep a list of the medicines you take. How can you care for yourself at home? · Reach and stay at a healthy weight. This will lower your risk for many problems, such as obesity, diabetes, heart disease, and high blood pressure. · Get at least 30 minutes of exercise on most days of the week. Walking is a good choice. You also may want to do other activities, such as running, swimming, cycling, or playing tennis or team sports. · Do not smoke. Smoking can make health problems worse. If you need help quitting, talk to your doctor about stop-smoking programs and medicines. These can increase your chances of quitting for good. · Protect your skin from too much sun. When you're outdoors from 10 a.m. to 4 p.m., stay in the shade or cover up with clothing and a hat with a wide brim. Wear sunglasses that block UV rays. Even when it's cloudy, put broad-spectrum sunscreen (SPF 30 or higher) on any exposed skin. · See a dentist one or two times a year for checkups and to have your teeth cleaned. · Wear a seat belt in the car. Follow your doctor's advice about when to have certain tests. These tests can spot problems early. For men and women · Cholesterol. Your doctor will tell you how often to have this done based on your overall health and other things that can increase your risk for heart attack and stroke. · Blood pressure. Have your blood pressure checked during a routine doctor visit. Your doctor will tell you how often to check your blood pressure based on your age, your blood pressure results, and other factors. · Diabetes. Ask your doctor whether you should have tests for diabetes. · Vision. Experts recommend that you have yearly exams for glaucoma and other age-related eye problems. · Hearing. Tell your doctor if you notice any change in your hearing. You can have tests to find out how well you hear. · Colon cancer tests. Keep having colon cancer tests as your doctor recommends. You can have one of several types of tests. · Heart attack and stroke risk. At least every 4 to 6 years, you should have your risk for heart attack and stroke assessed. Your doctor uses factors such as your age, blood pressure, cholesterol, and whether you smoke or have diabetes to show what your risk for a heart attack or stroke is over the next 10 years. · Osteoporosis. Talk to your doctor about whether you should have a bone density test to find out whether you have thinning bones. Ask your doctor if you need to take a calcium plus vitamin D supplement. You may be able to get enough calcium and vitamin D through your diet. For women · Pap test and pelvic exam. You may no longer need a Pap test. Talk with your doctor about whether to stop or continue to have Pap tests. · Breast exam and mammogram. Ask how often you should have a mammogram, which is an X-ray of your breasts. A mammogram can spot breast cancer before it can be felt and when it is easiest to treat. · Thyroid disease. Talk to your doctor about whether to have your thyroid checked as part of a regular physical exam. Women have an increased chance of a thyroid problem. For men · Prostate exam. Talk to your doctor about whether you should have a blood test (called a PSA test) for prostate cancer.  Experts recommend that you discuss the benefits and risks of the test with your doctor before you decide whether to have this test. Some experts say that men ages 79 and older no longer need testing. · Abdominal aortic aneurysm. Ask your doctor whether you should have a test to check for an aneurysm. You may need a test if you ever smoked or if your parent, brother, sister, or child has had an aneurysm. When should you call for help? Watch closely for changes in your health, and be sure to contact your doctor if you have any problems or symptoms that concern you. Where can you learn more? Go to http://www.gray.com/ Enter F500 in the search box to learn more about \"Well Visit, Over 65: Care Instructions. \" Current as of: May 27, 2020               Content Version: 12.6 © 7288-0754 [employer]. Care instructions adapted under license by [employer] (which disclaims liability or warranty for this information). If you have questions about a medical condition or this instruction, always ask your healthcare professional. [address] any warranty or liability for your use of this information.

## 2020-11-23 NOTE — PROGRESS NOTES
[facility] [address] [facility] Bek[place] Mackay. [place]  [phone]           Date of visit: 11/24/2020       This is an Subsequent Medicare Annual Wellness Visit (AWV), (Performed more than 12 months after effective date of Medicare Part B enrollment and 12 months after last preventive visit)    I have reviewed the patient's medical history in detail and updated the computerized patient record. Chadd Oropeza is a 70 y.o. female   History obtained from: the patient.     Concerns today     Chief Complaint   Patient presents with    Annual Wellness Visit     [facility] an [facility]TI  This is first one since July she thinks  It is helping, thinks it is keflex    When I last saw her in July she was having a lot of leg cramps and was going to try magnesium  That is much better with drinking more water and taking magnesium    Recovered from hysterectomy and repeated UTIs, incontinence this summer  Thinks she has another prolapse  Has appt with urogyn Dr. Ericka Aranda on 12/1  Has tried pessary and hated it    HTN on amlodipine/valsartan/hctz   Very good at home, 130s/80s    Prefers not to take a statin; due to recheck HLD    Chronic sinus/allergies on claritin and working well enough  Has seen an ENT    -history of BPPV, but denies dizziness recently     -dx last year osteopenia with FRAX score 10/1.8%    Really being careful to prevent COVID    History     Patient Active Problem List   Diagnosis Code    Dyslipidemia (high LDL; low HDL) E78.5    Benign essential hypertension I10    Needle phobia F40.298    Female genital prolapse N81.9    Impaired glucose tolerance R73.02    BMI 26.0-26.9,adult Z68.26    Osteopenia of multiple sites M85.89     Past Medical History:   Diagnosis Date    Benign essential hypertension 10/10/2016    Colonoscopy & Mammogram refused 10/10/2016    Dyslipidemia (high LDL; low HDL)     Full dentures     History of tobacco use Past Surgical History:   Procedure Laterality Date    ABDOMEN SURGERY PROC UNLISTED      HX GYN      D&C about 14    HX HYSTERECTOMY  2020    for prolapse    HX TYMPANOSTOMY Right ~    Dr Erick Cantrell HX UROLOGICAL  2020     Allergies   Allergen Reactions    Ricardo-Chelsy DEAN Itching     Current Outpatient Medications   Medication Sig Dispense Refill    trospium (SANCTURA) 20 mg tablet Take 1 Tab by mouth two (2) times a day.  valsartan-hydroCHLOROthiazide (DIOVAN-HCT) 80-12.5 mg per tablet TAKE 2 TABLETS BY MOUTH EVERY  Tab 1    amLODIPine (NORVASC) 5 mg tablet TAKE 1 TABLET BY MOUTH EVERY DAY 90 Tab 1    magnesium oxide (MAG-OX) 400 mg tablet Take 1 Tab by mouth nightly as needed.  loratadine (CLARITIN) 10 mg tablet Take 10 mg by mouth.  CHOLECALCIFEROL, VITAMIN D3, (VITAMIN D3 PO) Take 2,000 Units by mouth daily.  DOCOSAHEXANOIC ACID/EPA (FISH OIL PO) Take 1,000 mg by mouth daily. Family History   Problem Relation Age of Onset   Vannie Pulling Breast Cancer Mother          age 48    Heart Disease Father     Hypertension Father      Social History     Tobacco Use    Smoking status: Former Smoker     Packs/day: 1.00     Years: 25.00     Pack years: 25.00     Last attempt to quit: 2008     Years since quittin.9    Smokeless tobacco: Never Used   Substance Use Topics    Alcohol use: No       Specialists/Care Team   [facility] has established care with the following healthcare providers:  Patient Care Team:  Michael Murillo MD as PCP - General (Family Medicine)  Michael Murillo MD as PCP - [facility] Provider  Sridhar Moreno MD (Otolaryngology)  Radha Abbott MD (Ophthalmology)  Has dermatologist     Health Risk Assessment     Demographics   female  70 y.o.     General Health Questions   -During the past 4 weeks:   -how would you rate your health in general? Good    -how often have you been bothered by feeling dizzy when standing up? No   -how much have you been bothered by bodily pain? Mildly in shoulders, right knee; recently started osteobiflex, doing exercises from when she got shoulder injections before   -Have you noticed any hearing difficulties? No    -has your physical and emotional health limited your social activities with family or friends? no    Emotional Health Questions   -Do you have a history of depression, anxiety, or emotional problems? no  3 most recent PHQ Screens 11/24/2020   PHQ Not Done -   Little interest or pleasure in doing things Not at all   Feeling down, depressed, irritable, or hopeless Not at all   Total Score PHQ 2 0     Health Habits   Please describe your diet habits: they eat very well, plenty of veggies, beef only about once per week, mostly chicken and fish, drinks water; maybe not eating quite as well, more ice cream and cookies  Do you get 5 servings of fruits or vegetables daily? Yes   Do you exercise regularly? Yes, treadmill (recently had to get it back)    Activities of Daily Living and Functional Status   -Do you need help with eating, walking, dressing, bathing, toileting, the phone, transportation, shopping, preparing meals, housework, laundry, medications or managing money? No   -In the past four weeks, was someone available to help you if you needed and wanted help with anything? yes  -Are you confident are you that you can control and manage most of your health problems? yes  -Have you been given information to help you keep track of your medications? yes  -How often do you have trouble taking your medications as prescribed? never     Fall Risk and Home Safety   Have you fallen 2 or more times in the past year? No   Does your home have rugs in the hallway, lack grab bars in the bathroom, lack handrails on the stairs or have poor lighting? no  Do you have smoke detectors and check them regularly? yes  Do you have difficulties driving a car?  No   Do you always fasten your seat belt when you are in a car? yes    Review of Systems (if indicated for problems addressed today)   Card: denies chest pain  Pulm: denies shortness of breath     Physical Examination     Vitals:    11/24/20 1034   BP: (!) 182/87   Pulse: 72   Resp: 16   Temp: 97.5 °F (36.4 °C)   TempSrc: Temporal   SpO2: 100%   Weight: 136 lb 12.8 oz (62.1 kg)   Height: 5' 2\" (1.575 m)     Body mass index is 25.02 kg/m². No exam data present  Was the patient's timed Up & Go test unsteady or longer than 30 seconds? No     Evaluation of Cognitive Function   Mood/affect:  happy  Orientation: normal normal  Appearance: age appropriate  Family member/caregiver input: none    Additional exam if indicated for problems addressed today:  General: stated age, well-developed, and in NAD  Eyes: PERRL, EOMI, no redness or drainage  Nose: no drainage  Mouth: no lesions  Throat: no erythema, exudate or swelling  Neck: supple, symmetrical, trachea midline, no adenopathy and thyroid: not enlarged, symmetric, no tenderness/mass/nodules  Lungs:  clear to auscultation w/o rales, rhonchi, wheezes w/normal effort and no use of accessory muscles of respiration   Heart: regular rate and rhythm, S1, S2 normal, no murmur, click, rub or gallop  Abdomen: soft, nontender, no masses  Ext:  No edema noted.    Lymph: no cervical adenopathy appreciated  Skin:  Normal. and no rash or abnormalities   Neuro: normal gait, CN 2-12 intact  Psych: alert and oriented to person, place, time and situation and Speech: appropriate quality, quantity and organization of sentences  Advice/Referrals/Counseling (as indicated)   Education and counseling provided for any problems identified above: continued healthy diet/exercise encouraged     Preventive Services     Health Maintenance   Topic Date Due    Colorectal Cancer Screening Combo  11/05/2015    Flu Vaccine (1) 09/01/2020    Medicare Yearly Exam  11/14/2020    Breast Cancer Screen Mammogram  02/26/2021 (Originally 10/10/2017)    GLAUCOMA SCREENING Q2Y  09/16/2021    DTaP/Tdap/Td series (2 - Td) 11/05/2024    Lipid Screen  11/14/2024    Bone Densitometry (Dexa) Screening  Completed    Shingrix Vaccine Age 50>  Completed    Pneumococcal 65+ years  Completed    Hepatitis C Screening  Addressed      (Preventive care checklist to be included in patient instructions)  Discussed today Done Previously Not Needed     X 23 done  Pneumococcal vaccines    x  Flu vaccine     x Hepatitis B vaccine (if at risk)    x  Shingles vaccine    X 2014  TDAP vaccine   X declines    Mammogram     x Pap smear   X FOBT rx given today    Colorectal cancer screening     x Low-dose CT for lung cancer screening    X mild osteopenia 12/19  Bone density test   X cancelled, will go later   Glaucoma screening   x   Cholesterol test   x   Diabetes screening test      x Diabetes self-management class     x Nutritionist referral for diabetes or renal disease     Discussion of Advance Directive   Discussed with Kulwant Mendez her ability to prepare and advance directive in the case that an injury or illness causes her to be unable to make health care decisions. Gave her honoring choices packet in the past, not wanting to make one now  Says she would not want to be on life support for a long time    Assessment/Plan   Z00.00    ICD-10-CM ICD-9-CM    1. Medicare annual wellness visit, subsequent  Z00.00 V70.0    2. Benign essential hypertension  I10 401.1 CBC WITH AUTOMATED DIFF      METABOLIC PANEL, COMPREHENSIVE      LIPID PANEL   3. Dyslipidemia (high LDL; low HDL)  E78.5 272.4    4. Impaired glucose tolerance  R73.02 790.22 HEMOGLOBIN A1C WITH EAG   5. Recurrent UTI  N39.0 599.0    6. Leg cramps  R25.2 729.82    7. Osteopenia of multiple sites  M85.89 733.90    8. Encounter for immunization  Z23 V03.89    9.  Colon cancer screening  Z12.11 V76.51 OCCULT BLOOD IMMUNOASSAY,DIAGNOSTIC       Orders Placed This Encounter    CBC WITH AUTOMATED DIFF    METABOLIC PANEL, COMPREHENSIVE    LIPID PANEL    HEMOGLOBIN A1C WITH EAG    OCCULT BLOOD IMMUNOASSAY,DIAGNOSTIC    trospium (SANCTURA) 20 mg tablet     Preventive up to date    Will come back in the spring for labs as not wanting exposure to covid germs today  Had labs in July with hysterectomy anyway    Overall doing very well, no changes to meds    Follow-up and Dispositions    · Return in about 6 months (around 5/24/2021).          Nuris Rodriguez MD

## 2020-11-24 ENCOUNTER — OFFICE VISIT (OUTPATIENT)
Dept: FAMILY MEDICINE CLINIC | Age: 75
End: 2020-11-24
Payer: MEDICARE

## 2020-11-24 VITALS
HEART RATE: 72 BPM | BODY MASS INDEX: 25.17 KG/M2 | TEMPERATURE: 97.5 F | WEIGHT: 136.8 LBS | SYSTOLIC BLOOD PRESSURE: 160 MMHG | HEIGHT: 62 IN | DIASTOLIC BLOOD PRESSURE: 80 MMHG | OXYGEN SATURATION: 100 % | RESPIRATION RATE: 16 BRPM

## 2020-11-24 DIAGNOSIS — Z00.00 MEDICARE ANNUAL WELLNESS VISIT, SUBSEQUENT: Primary | ICD-10-CM

## 2020-11-24 DIAGNOSIS — I10 BENIGN ESSENTIAL HYPERTENSION: ICD-10-CM

## 2020-11-24 DIAGNOSIS — Z23 ENCOUNTER FOR IMMUNIZATION: ICD-10-CM

## 2020-11-24 DIAGNOSIS — R25.2 LEG CRAMPS: ICD-10-CM

## 2020-11-24 DIAGNOSIS — R73.02 IMPAIRED GLUCOSE TOLERANCE: ICD-10-CM

## 2020-11-24 DIAGNOSIS — M85.89 OSTEOPENIA OF MULTIPLE SITES: ICD-10-CM

## 2020-11-24 DIAGNOSIS — E78.5 DYSLIPIDEMIA (HIGH LDL; LOW HDL): ICD-10-CM

## 2020-11-24 DIAGNOSIS — N39.0 RECURRENT UTI: ICD-10-CM

## 2020-11-24 DIAGNOSIS — Z12.11 COLON CANCER SCREENING: ICD-10-CM

## 2020-11-24 PROCEDURE — G8427 DOCREV CUR MEDS BY ELIG CLIN: HCPCS | Performed by: FAMILY MEDICINE

## 2020-11-24 PROCEDURE — 99214 OFFICE O/P EST MOD 30 MIN: CPT | Performed by: FAMILY MEDICINE

## 2020-11-24 PROCEDURE — G8754 DIAS BP LESS 90: HCPCS | Performed by: FAMILY MEDICINE

## 2020-11-24 PROCEDURE — 3017F COLORECTAL CA SCREEN DOC REV: CPT | Performed by: FAMILY MEDICINE

## 2020-11-24 PROCEDURE — G8753 SYS BP > OR = 140: HCPCS | Performed by: FAMILY MEDICINE

## 2020-11-24 PROCEDURE — 1101F PT FALLS ASSESS-DOCD LE1/YR: CPT | Performed by: FAMILY MEDICINE

## 2020-11-24 PROCEDURE — G0439 PPPS, SUBSEQ VISIT: HCPCS | Performed by: FAMILY MEDICINE

## 2020-11-24 PROCEDURE — G8536 NO DOC ELDER MAL SCRN: HCPCS | Performed by: FAMILY MEDICINE

## 2020-11-24 PROCEDURE — G8399 PT W/DXA RESULTS DOCUMENT: HCPCS | Performed by: FAMILY MEDICINE

## 2020-11-24 PROCEDURE — 1090F PRES/ABSN URINE INCON ASSESS: CPT | Performed by: FAMILY MEDICINE

## 2020-11-24 PROCEDURE — G8419 CALC BMI OUT NRM PARAM NOF/U: HCPCS | Performed by: FAMILY MEDICINE

## 2020-11-24 PROCEDURE — G8510 SCR DEP NEG, NO PLAN REQD: HCPCS | Performed by: FAMILY MEDICINE

## 2020-11-24 PROCEDURE — G0463 HOSPITAL OUTPT CLINIC VISIT: HCPCS | Performed by: FAMILY MEDICINE

## 2020-11-24 RX ORDER — TROSPIUM CHLORIDE 20 MG/1
1 TABLET, FILM COATED ORAL 2 TIMES DAILY
COMMUNITY
Start: 2020-11-12 | End: 2021-06-21 | Stop reason: ALTCHOICE

## 2020-11-24 NOTE — PROGRESS NOTES
Chief Complaint   Patient presents with   [address] [place] [address] Annual Wellness Visit     1. Have you been to the ER, urgent care clinic since your last visit? Hospitalized since your last visit? Yes When: [facility] Orellana UTI sx     2. Have you seen or consulted any other health care providers outside of the [address] [place] [address] since your last visit? Include any pap smears or colon screening. Yes When: Dr Jammie Starr urology       Patient presents in office for AWV. Seen at [facility] for UTI sx.  Flu shot in September

## 2020-11-27 ENCOUNTER — PATIENT MESSAGE (OUTPATIENT)
Dept: FAMILY MEDICINE CLINIC | Age: 75
End: 2020-11-27

## 2020-11-27 RX ORDER — VALSARTAN AND HYDROCHLOROTHIAZIDE 160; 12.5 MG/1; MG/1
1 TABLET, FILM COATED ORAL DAILY
Qty: 90 TAB | Refills: 1 | Status: SHIPPED | OUTPATIENT
Start: 2020-11-27 | End: 2021-05-25 | Stop reason: SDUPTHER

## 2020-12-01 ENCOUNTER — VIRTUAL VISIT (OUTPATIENT)
Dept: FAMILY MEDICINE CLINIC | Age: 75
End: 2020-12-01
Payer: MEDICARE

## 2020-12-01 DIAGNOSIS — I10 BENIGN ESSENTIAL HYPERTENSION: Primary | ICD-10-CM

## 2020-12-01 PROCEDURE — 99213 OFFICE O/P EST LOW 20 MIN: CPT | Performed by: FAMILY MEDICINE

## 2020-12-01 PROCEDURE — 3017F COLORECTAL CA SCREEN DOC REV: CPT | Performed by: FAMILY MEDICINE

## 2020-12-01 PROCEDURE — G8756 NO BP MEASURE DOC: HCPCS | Performed by: FAMILY MEDICINE

## 2020-12-01 PROCEDURE — G8399 PT W/DXA RESULTS DOCUMENT: HCPCS | Performed by: FAMILY MEDICINE

## 2020-12-01 PROCEDURE — G8432 DEP SCR NOT DOC, RNG: HCPCS | Performed by: FAMILY MEDICINE

## 2020-12-01 PROCEDURE — 1090F PRES/ABSN URINE INCON ASSESS: CPT | Performed by: FAMILY MEDICINE

## 2020-12-01 PROCEDURE — G0463 HOSPITAL OUTPT CLINIC VISIT: HCPCS | Performed by: FAMILY MEDICINE

## 2020-12-01 PROCEDURE — G8427 DOCREV CUR MEDS BY ELIG CLIN: HCPCS | Performed by: FAMILY MEDICINE

## 2020-12-01 PROCEDURE — 1101F PT FALLS ASSESS-DOCD LE1/YR: CPT | Performed by: FAMILY MEDICINE

## 2020-12-01 NOTE — PATIENT INSTRUCTIONS
DASH Diet: Care Instructions Your Care Instructions The DASH diet is an eating plan that can help lower your blood pressure. DASH stands for Dietary Approaches to Stop Hypertension. Hypertension is high blood pressure. The DASH diet focuses on eating foods that are high in calcium, potassium, and magnesium. These nutrients can lower blood pressure. The foods that are highest in these nutrients are fruits, vegetables, low-fat dairy products, nuts, seeds, and legumes. But taking calcium, potassium, and magnesium supplements instead of eating foods that are high in those nutrients does not have the same effect. The DASH diet also includes whole grains, fish, and poultry. The DASH diet is one of several lifestyle changes your doctor may recommend to lower your high blood pressure. Your doctor may also want you to decrease the amount of sodium in your diet. Lowering sodium while following the DASH diet can lower blood pressure even further than just the DASH diet alone. Follow-up care is a key part of your treatment and safety. Be sure to make and go to all appointments, and call your doctor if you are having problems. It's also a good idea to know your test results and keep a list of the medicines you take. How can you care for yourself at home? Following the DASH diet · Eat 4 to 5 servings of fruit each day. A serving is 1 medium-sized piece of fruit, ½ cup chopped or canned fruit, 1/4 cup dried fruit, or 4 ounces (½ cup) of fruit juice. Choose fruit more often than fruit juice. · Eat 4 to 5 servings of vegetables each day. A serving is 1 cup of lettuce or raw leafy vegetables, ½ cup of chopped or cooked vegetables, or 4 ounces (½ cup) of vegetable juice. Choose vegetables more often than vegetable juice. · Get 2 to 3 servings of low-fat and fat-free dairy each day. A serving is 8 ounces of milk, 1 cup of yogurt, or 1 ½ ounces of cheese. · Eat 6 to 8 servings of grains each day. A serving is 1 slice of bread, 1 ounce of dry cereal, or ½ cup of cooked rice, pasta, or cooked cereal. Try to choose whole-grain products as much as possible. · Limit lean meat, poultry, and fish to 2 servings each day. A serving is 3 ounces, about the size of a deck of cards. · Eat 4 to 5 servings of nuts, seeds, and legumes (cooked dried beans, lentils, and split peas) each week. A serving is 1/3 cup of nuts, 2 tablespoons of seeds, or ½ cup of cooked beans or peas. · Limit fats and oils to 2 to 3 servings each day. A serving is 1 teaspoon of vegetable oil or 2 tablespoons of salad dressing. · Limit sweets and added sugars to 5 servings or less a week. A serving is 1 tablespoon jelly or jam, ½ cup sorbet, or 1 cup of lemonade. · Eat less than 2,300 milligrams (mg) of sodium a day. If you limit your sodium to 1,500 mg a day, you can lower your blood pressure even more. Tips for success · Start small. Do not try to make dramatic changes to your diet all at once. You might feel that you are missing out on your favorite foods and then be more likely to not follow the plan. Make small changes, and stick with them. Once those changes become habit, add a few more changes. · Try some of the following: ? Make it a goal to eat a fruit or vegetable at every meal and at snacks. This will make it easy to get the recommended amount of fruits and vegetables each day. ? Try yogurt topped with fruit and nuts for a snack or healthy dessert. ? Add lettuce, tomato, cucumber, and onion to sandwiches. ? Combine a ready-made pizza crust with low-fat mozzarella cheese and lots of vegetable toppings. Try using tomatoes, squash, spinach, broccoli, carrots, cauliflower, and onions. ? Have a variety of cut-up vegetables with a low-fat dip as an appetizer instead of chips and dip. ? Sprinkle sunflower seeds or chopped almonds over salads.  Or try adding chopped walnuts or almonds to cooked vegetables. ? Try some vegetarian meals using beans and peas. Add garbanzo or kidney beans to salads. Make burritos and tacos with mashed tipton beans or black beans. Where can you learn more? Go to http://www.gray.com/ Enter I173 in the search box to learn more about \"DASH Diet: Care Instructions. \" Current as of: December 16, 2019               Content Version: 12.6 © 5460-8889 [employer]. Care instructions adapted under license by [employer] (which disclaims liability or warranty for this information). If you have questions about a medical condition or this instruction, always ask your healthcare professional. [address] any warranty or liability for your use of this information.

## 2020-12-01 NOTE — PROGRESS NOTES
Rocky Salgado [facility]  Joseph Keenan. [facility], [address]  [phone]             Date of visit: 12/1/2020   Subjective:      History obtained from:  the patient. Cydney Spangler is a 70 y.o. female who presents today for   Chief Complaint   Patient presents with    Hypertension     This service was provided through telehealth (doxy. me real-time video/audio) due to COVID-19 pandemic, with the patient being at home and the provider being at [facility] in [place], [place]. Others assisting in the telehealth encounter included  at home with her. 6 minutes were spent with the patient by the provider. she  and/or her healthcare decision maker is aware that this patient-initiated Telehealth encounter is a billable service, with coverage as determined by her insurance carrier. she  is aware that she  may receive a bill and has provided verbal consent to proceed: Yes, per PSR. bp running better with increased valsartan starting last Friday  120s/70s at home  Was 150s/80s at urology office today    No longer on baby aspirin, asked about whether she should. Patient Active Problem List    Diagnosis Date Noted    Osteopenia of multiple sites 12/23/2019    BMI 26.0-26.9,adult 04/27/2018    Impaired glucose tolerance 10/09/2017    Needle phobia 07/19/2017    Female genital prolapse 07/19/2017    Benign essential hypertension 10/10/2016    Dyslipidemia (high LDL; low HDL)      Current Outpatient Medications   Medication Sig Dispense Refill    valsartan-hydroCHLOROthiazide (DIOVAN-HCT) 160-12.5 mg per tablet Take 1 Tab by mouth daily. Increased dose 90 Tab 1    trospium (SANCTURA) 20 mg tablet Take 1 Tab by mouth two (2) times a day.  amLODIPine (NORVASC) 5 mg tablet TAKE 1 TABLET BY MOUTH EVERY DAY 90 Tab 1    magnesium oxide (MAG-OX) 400 mg tablet Take 1 Tab by mouth nightly as needed.  loratadine (CLARITIN) 10 mg tablet Take 10 mg by mouth.  CHOLECALCIFEROL, VITAMIN D3, (VITAMIN D3 PO) Take 2,000 Units by mouth daily.  DOCOSAHEXANOIC ACID/EPA (FISH OIL PO) Take 1,000 mg by mouth daily. Allergies   Allergen Reactions    Pen-Vee DIANNE Itching     Past Medical History:   Diagnosis Date    Benign essential hypertension 10/10/2016    Colonoscopy & Mammogram refused 10/10/2016    Dyslipidemia (high LDL; low HDL)     Full dentures     History of tobacco use      Past Surgical History:   Procedure Laterality Date    ABDOMEN SURGERY PROC Mara Bones GYN      D&C about 14    HX HYSTERECTOMY  2020    for prolapse    HX TYMPANOSTOMY Right ~2008    Dr Roro Chávez  2020     Family History   Problem Relation Age of Onset    Breast Cancer Mother          age 46    Heart Disease Father     Hypertension Father      Social History     Tobacco Use    Smoking status: Former Smoker     Packs/day: 1.00     Years: 25.00     Pack years: 25.00     Last attempt to quit: 2008     Years since quittin.0    Smokeless tobacco: Never Used   Substance Use Topics    Alcohol use: No      Social History     Social History Narrative    Not on file        Review of Systems  Card: denies chest pain  Pulm: denies shortness of breath      Objective: There were no vitals filed for this visit. There is no height or weight on file to calculate BMI. General: stated age, well developed, well nourished and in NAD  Skin:  No lesions noted on video  Psych: alert and oriented to person, place, time and situation and Speech: appropriate quality, quantity and organization of sentences     Assessment/Plan:       ICD-10-CM ICD-9-CM    1.  Benign essential hypertension  I10 401.1       bp's doing well on increased valsartan  Encouraged her to do labs soon but she is concerned about high rates of covid right now  Will plan to do them soon after new year but sooner if not feeling well  She will update me on BP's on portal periodically as well    Discussed the diagnosis and plan and she expressed understanding. Follow-up and Dispositions    · Return in about 6 months (around 6/1/2021) for Follow up.          Sandra Hernandez MD

## 2020-12-04 ENCOUNTER — PATIENT MESSAGE (OUTPATIENT)
Dept: FAMILY MEDICINE CLINIC | Age: 75
End: 2020-12-04

## 2021-02-15 RX ORDER — AMLODIPINE BESYLATE 5 MG/1
5 TABLET ORAL DAILY
Qty: 90 TAB | Refills: 1 | Status: CANCELLED | OUTPATIENT
Start: 2021-02-15

## 2021-02-15 NOTE — TELEPHONE ENCOUNTER
Last Visit: VV 12/1/20 MD Ulloa  Next Appointment: Not scheduled- due 6/2021  Previous Refill Encounter(s): 8/21/20 90 + 1    Requested Prescriptions     Pending Prescriptions Disp Refills    amLODIPine (NORVASC) 5 mg tablet 90 Tab 1     Sig: Take 1 Tab by mouth daily.

## 2021-02-18 NOTE — TELEPHONE ENCOUNTER
Patient sent message of appointment date and time available with Dr. Dannie Hernandez to have medication refill.   Marian Purcell LPN

## 2021-03-15 RX ORDER — AMLODIPINE BESYLATE 5 MG/1
5 TABLET ORAL DAILY
Qty: 30 TAB | Refills: 0 | Status: SHIPPED | OUTPATIENT
Start: 2021-03-15 | End: 2021-04-12

## 2021-03-15 NOTE — TELEPHONE ENCOUNTER
Patient call and needs refill of amlodipine. Previous request  2/2021 with no medication sent. (MD Ulloa patient)   Patient is requesting a callback! [phone]   Thanks, Nia Bridges    Last Visit: 12/1/20 MD Ulloa  Next Appointment: Not scheduled-office tried to set up appt. thru My Chart but message not read. Previous Refill Encounter(s): 8/21/20 90 + 1    Requested Prescriptions     Pending Prescriptions Disp Refills    amLODIPine (NORVASC) 5 mg tablet 90 Tab 0     Sig: Take 1 Tab by mouth daily.

## 2021-03-16 ENCOUNTER — PATIENT MESSAGE (OUTPATIENT)
Dept: FAMILY MEDICINE CLINIC | Age: 76
End: 2021-03-16

## 2021-04-12 RX ORDER — AMLODIPINE BESYLATE 5 MG/1
TABLET ORAL
Qty: 30 TAB | Refills: 0 | Status: SHIPPED | OUTPATIENT
Start: 2021-04-12 | End: 2021-05-11

## 2021-05-11 RX ORDER — AMLODIPINE BESYLATE 5 MG/1
TABLET ORAL
Qty: 30 TAB | Refills: 0 | Status: SHIPPED | OUTPATIENT
Start: 2021-05-11 | End: 2021-06-10

## 2021-05-25 DIAGNOSIS — I10 BENIGN ESSENTIAL HYPERTENSION: Primary | ICD-10-CM

## 2021-05-25 RX ORDER — VALSARTAN AND HYDROCHLOROTHIAZIDE 160; 12.5 MG/1; MG/1
1 TABLET, FILM COATED ORAL DAILY
Qty: 30 TABLET | Refills: 0 | Status: SHIPPED | OUTPATIENT
Start: 2021-05-25 | End: 2021-06-17 | Stop reason: SDUPTHER

## 2021-05-25 NOTE — TELEPHONE ENCOUNTER
Last visit 12/01/2020 Virtual visit MD Ulloa   Next appointment 06/21/2021 MD Bajwa   Previous refill encounter(s)   11/27/2020 Diovan-HCT #90 with 1 refill     Requested Prescriptions     Pending Prescriptions Disp Refills    valsartan-hydroCHLOROthiazide (DIOVAN-HCT) 160-12.5 mg per tablet 30 Tablet 0     Sig: Take 1 Tablet by mouth daily.  Increased dose

## 2021-05-26 DIAGNOSIS — I10 BENIGN ESSENTIAL HYPERTENSION: ICD-10-CM

## 2021-05-27 RX ORDER — VALSARTAN AND HYDROCHLOROTHIAZIDE 160; 12.5 MG/1; MG/1
TABLET, FILM COATED ORAL
Qty: 90 TABLET | OUTPATIENT
Start: 2021-05-27

## 2021-06-10 RX ORDER — AMLODIPINE BESYLATE 5 MG/1
TABLET ORAL
Qty: 30 TABLET | Refills: 0 | Status: SHIPPED | OUTPATIENT
Start: 2021-06-10 | End: 2021-07-14

## 2021-06-17 DIAGNOSIS — I10 BENIGN ESSENTIAL HYPERTENSION: ICD-10-CM

## 2021-06-17 NOTE — TELEPHONE ENCOUNTER
Patient's insurance is asking for 90 d/s for patient. Patient has appt next week. Thanks, Kavya    Last Visit: VV 12/1/20 MD Ulloa  Next Appointment: 6/21/21 Bruce  Previous Refill Encounter(s): 5/25/21 30    Requested Prescriptions     Pending Prescriptions Disp Refills    valsartan-hydroCHLOROthiazide (DIOVAN-HCT) 160-12.5 mg per tablet 90 Tablet 0     Sig: Take 1 Tablet by mouth daily.  Increased dose

## 2021-06-18 NOTE — TELEPHONE ENCOUNTER
Appt is coming up in 4 days. Her BP was not controlled last visit and she has not had renal function and lytes checked since . Changes may need to occur and it would be senseless to send in an entire 90 day supply if I have to end up changing something in 4 days. See if patient can get short local supply for about a week or 2 to last until follow up evaluation.

## 2021-06-18 NOTE — TELEPHONE ENCOUNTER
MD Bajwa,    Yes, it was just a recommendation from [facility]. We can send smaller amt. Updated to 30 d/s for now if ok. Thanks, Kavya    Previous Refill Encounter(s): 5/25/21 30    Requested Prescriptions     Pending Prescriptions Disp Refills    valsartan-hydroCHLOROthiazide (DIOVAN-HCT) 160-12.5 mg per tablet 30 Tablet 0     Sig: Take 1 Tablet by mouth daily.

## 2021-06-19 RX ORDER — VALSARTAN AND HYDROCHLOROTHIAZIDE 160; 12.5 MG/1; MG/1
1 TABLET, FILM COATED ORAL DAILY
Qty: 30 TABLET | Refills: 0 | Status: SHIPPED | OUTPATIENT
Start: 2021-06-19 | End: 2021-06-21 | Stop reason: DRUGHIGH

## 2021-06-21 ENCOUNTER — OFFICE VISIT (OUTPATIENT)
Dept: FAMILY MEDICINE CLINIC | Age: 76
End: 2021-06-21
Payer: MEDICARE

## 2021-06-21 VITALS
HEIGHT: 62 IN | WEIGHT: 143 LBS | HEART RATE: 72 BPM | RESPIRATION RATE: 18 BRPM | TEMPERATURE: 97.9 F | SYSTOLIC BLOOD PRESSURE: 162 MMHG | DIASTOLIC BLOOD PRESSURE: 80 MMHG | BODY MASS INDEX: 26.31 KG/M2 | OXYGEN SATURATION: 98 %

## 2021-06-21 DIAGNOSIS — N81.10 FEMALE BLADDER PROLAPSE: ICD-10-CM

## 2021-06-21 DIAGNOSIS — Z76.89 ENCOUNTER TO ESTABLISH CARE: Primary | ICD-10-CM

## 2021-06-21 DIAGNOSIS — N30.90 BLADDER INFECTION: ICD-10-CM

## 2021-06-21 DIAGNOSIS — E78.2 MIXED DYSLIPIDEMIA: ICD-10-CM

## 2021-06-21 DIAGNOSIS — N39.0 RECURRENT UTI: ICD-10-CM

## 2021-06-21 DIAGNOSIS — I10 BENIGN ESSENTIAL HYPERTENSION: ICD-10-CM

## 2021-06-21 DIAGNOSIS — R73.03 PREDIABETES: ICD-10-CM

## 2021-06-21 LAB
BILIRUB UR QL STRIP: NEGATIVE
GLUCOSE UR-MCNC: NEGATIVE MG/DL
KETONES P FAST UR STRIP-MCNC: NEGATIVE MG/DL
PH UR STRIP: 6 [PH] (ref 4.6–8)
PROT UR QL STRIP: NEGATIVE
SP GR UR STRIP: 1.01 (ref 1–1.03)
UA UROBILINOGEN AMB POC: NORMAL (ref 0.2–1)
URINALYSIS CLARITY POC: NORMAL
URINALYSIS COLOR POC: NORMAL
URINE BLOOD POC: NORMAL
URINE LEUKOCYTES POC: NORMAL
URINE NITRITES POC: NEGATIVE

## 2021-06-21 PROCEDURE — G8536 NO DOC ELDER MAL SCRN: HCPCS | Performed by: FAMILY MEDICINE

## 2021-06-21 PROCEDURE — G8754 DIAS BP LESS 90: HCPCS | Performed by: FAMILY MEDICINE

## 2021-06-21 PROCEDURE — 1090F PRES/ABSN URINE INCON ASSESS: CPT | Performed by: FAMILY MEDICINE

## 2021-06-21 PROCEDURE — 3017F COLORECTAL CA SCREEN DOC REV: CPT | Performed by: FAMILY MEDICINE

## 2021-06-21 PROCEDURE — G8427 DOCREV CUR MEDS BY ELIG CLIN: HCPCS | Performed by: FAMILY MEDICINE

## 2021-06-21 PROCEDURE — G8753 SYS BP > OR = 140: HCPCS | Performed by: FAMILY MEDICINE

## 2021-06-21 PROCEDURE — 99214 OFFICE O/P EST MOD 30 MIN: CPT | Performed by: FAMILY MEDICINE

## 2021-06-21 PROCEDURE — 81003 URINALYSIS AUTO W/O SCOPE: CPT | Performed by: FAMILY MEDICINE

## 2021-06-21 PROCEDURE — G8399 PT W/DXA RESULTS DOCUMENT: HCPCS | Performed by: FAMILY MEDICINE

## 2021-06-21 PROCEDURE — G0463 HOSPITAL OUTPT CLINIC VISIT: HCPCS | Performed by: FAMILY MEDICINE

## 2021-06-21 PROCEDURE — 1101F PT FALLS ASSESS-DOCD LE1/YR: CPT | Performed by: FAMILY MEDICINE

## 2021-06-21 PROCEDURE — G8510 SCR DEP NEG, NO PLAN REQD: HCPCS | Performed by: FAMILY MEDICINE

## 2021-06-21 PROCEDURE — G8419 CALC BMI OUT NRM PARAM NOF/U: HCPCS | Performed by: FAMILY MEDICINE

## 2021-06-21 RX ORDER — BIOTIN 1000 MCG
TABLET,CHEWABLE ORAL DAILY
COMMUNITY

## 2021-06-21 RX ORDER — TRIAMCINOLONE ACETONIDE 1 MG/G
OINTMENT TOPICAL 2 TIMES DAILY
COMMUNITY
End: 2021-10-20 | Stop reason: SDUPTHER

## 2021-06-21 RX ORDER — TIZANIDINE HYDROCHLORIDE 2 MG/1
2 CAPSULE, GELATIN COATED ORAL 3 TIMES DAILY
COMMUNITY
End: 2022-01-01

## 2021-06-21 RX ORDER — CEPHALEXIN 250 MG/1
250 CAPSULE ORAL 3 TIMES DAILY
Qty: 21 CAPSULE | Refills: 0 | Status: SHIPPED | OUTPATIENT
Start: 2021-06-21 | End: 2021-07-21 | Stop reason: SDUPTHER

## 2021-06-21 RX ORDER — VALSARTAN AND HYDROCHLOROTHIAZIDE 320; 12.5 MG/1; MG/1
1 TABLET, FILM COATED ORAL DAILY
Qty: 30 TABLET | Refills: 0 | Status: SHIPPED | OUTPATIENT
Start: 2021-06-21 | End: 2021-06-22

## 2021-06-21 NOTE — PROGRESS NOTES
Chief Complaint   Patient presents with   Amee Morales Establish Care    Bladder Infection     1. Have you been to the ER, urgent care clinic since your last visit? Hospitalized since your last visit? No    2. Have you seen or consulted any other health care providers outside of the [address] [place] [address] since your last visit? Include any pap smears or colon screening.  No

## 2021-06-21 NOTE — PROGRESS NOTES
Chief Complaint   Patient presents with   [facility]    Bladder Infection    Hypertension       HISTORY OF PRESENT ILLNESS   HPI  Prior patient of mine whom I saw several years ago who ended up seeing Dr. Meryl Najjar for availability now presents to re-establish care w/ me as PCP. She has h/o chronic benign essential hypertension and states that years ago I had to end up making various adjustments in her medications due to poorly controlled hypertension. She states that the regimen continued to work well for her over many years. Then she states since Covid pandemic hit last spring, her eating habits have not been as good and she was more sedentary. Her wt went up from 136 lbs to the 140's now. Her BP is up today. At home it usually runs 132-140's/80-87 range. She also has h/o urogenital prolapse. She was getting recurrent UTI's a few x a year. She underwent vaginal hysterectomy and bladder prolapse repair in 7-2020. Her symptoms were much improved until ~  when she could start to feel the prolapse recur and since then she has been getting the recurrent infections again. She has been on rounds of Macrobid and she thinks Cipro 750 but both of those upset her stomach. She was treated for her last UTI back in  at [facility].  She was prescribed Keflex 250 mg qid x 10 days and she tolerated that one really really well. The past week she has been experiencing some UTI symptoms again: urgency, frequency, nocturia, slight burning and irritation on urination. No abdominal pain, back pain, hematuria, fevers, chills, sweats. She had gotten some blood in her urine w/ some of her past UTI's but has not had that in a long time. She states that Dr. Adrian Vanessa is planning on doing another prolapse repair, this time robotic assisted and using mesh. REVIEW OF SYMPTOMS   Review of Systems   Constitutional: Negative. Negative for chills and fever. Eyes: Negative. Respiratory: Negative. Cardiovascular: Negative. Gastrointestinal: Negative. Genitourinary: Positive for dysuria, frequency and urgency. Negative for flank pain and hematuria. Neurological: Negative. Endo/Heme/Allergies: Negative. PROBLEM LIST/MEDICAL HISTORY     Problem List  Date Reviewed: 2021        Codes Class Noted    Osteopenia of multiple sites ICD-10-CM: M85.89  ICD-9-CM: 733.90  2019        BMI 26.0-26.9,adult ICD-10-CM: Z68.26  ICD-9-CM: V85.22  2018        Impaired glucose tolerance ICD-10-CM: R73.02  ICD-9-CM: 790.22  10/9/2017        Needle phobia ICD-10-CM: F40.298  ICD-9-CM: 300.29  2017        Female genital prolapse ICD-10-CM: N81.9  ICD-9-CM: 618.9  2017        Benign essential hypertension ICD-10-CM: I10  ICD-9-CM: 401.1  10/10/2016        Colonoscopy & Mammogram refused ICD-10-CM: Z53.20  ICD-9-CM: V64.2  10/10/2016        Dyslipidemia (high LDL; low HDL) ICD-10-CM: E78.5  ICD-9-CM: 272.4  Unknown    Overview Signed 2010  9:18 AM by Yolanda Bush LPN     Low HDL                 OB History        5    Para   2    Term                AB   3    Living           SAB        TAB        Ectopic        Molar        Multiple        Live Births              Obstetric Comments   Vaginal delivery x 2, SAB x 2, Stillbirth at 10 months old                 PAST SURGICAL HISTORY     Past Surgical History:   Procedure Laterality Date    HX GYN      D&C about 14    HX TOTAL VAGINAL HYSTERECTOMY  2020    Prolapse    HX TYMPANOSTOMY Right ~    Dr Elizabeth Pretty HX UROLOGICAL  2020    Prolapse, Dr. Bety Wang     Current Outpatient Medications   Medication Sig    tiZANidine (ZANAFLEX) 2 mg capsule Take 2 mg by mouth three (3) times daily.  triamcinolone acetonide (KENALOG) 0.1 % ointment Apply  to affected area two (2) times a day. use thin layer    biotin 1,000 mcg chew Take  by mouth daily.     valsartan-hydroCHLOROthiazide (DIOVAN-HCT) 160-12.5 mg per tablet Take 1 Tablet by mouth daily.  amLODIPine (NORVASC) 5 mg tablet TAKE 1 TABLET BY MOUTH DAILY    loratadine (CLARITIN) 10 mg tablet Take 10 mg by mouth.  DOCOSAHEXANOIC ACID/EPA (FISH OIL PO) Take 1,000 mg by mouth daily. No current facility-administered medications for this visit.           ALLERGIES     Allergies   Allergen Reactions    Ciprofloxacin Nausea Only    Macrobid [Nitrofurantoin Monohyd/M-Cryst] Nausea Only    Pen-Vee K Itching     Keflex OK          SOCIAL HISTORY     Social History     Tobacco Use    Smoking status: Former Smoker     Packs/day: 1.00     Years: 25.00     Pack years: 25.00     Quit date: 2008     Years since quittin.5    Smokeless tobacco: Never Used   Substance Use Topics    Alcohol use: No     Social History     Social History Narrative    , retired, 2 children    Lives with , Leny Bynum    Diet: regular    Exercise: just started stationary bike 3-4 x a week x 10 minutes and walking in neighborhood w/     Caffeine: 2 cups of coffee a day, several glasses of green tea a day    Weight: has gained some weight since Covid pandemic 3-2020         Social History     Substance and Sexual Activity   Sexual Activity Yes    Partners: Male       IMMUNIZATIONS     Immunization History   Administered Date(s) Administered    H1N1 Influenza Virus Vaccine 11/10/2010    Influenza High Dose Vaccine PF 10/10/2016, 10/10/2017    Influenza Vaccine 10/07/2013, 2014    Influenza Vaccine (Quad) PF (>6 Mo Flulaval, Fluarix, and >3 Yrs Afluria, Fluzone [id number]) 10/02/2015    Influenza Vaccine (Tri) Adjuvanted (>65 Yrs FLUAD TRI 52555) 10/04/2018, 2019    Influenza Vaccine Split 10/08/2009, 2011, 10/26/2012    Influenza, High-dose, Quadrivalent (>65 Yrs Fluzone High Dose Quad [id number]) 2020    Pneumococcal Polysaccharide (PPSV-23) 10/10/2016    TD Vaccine 2003    Tdap 2014    Zoster Recombinant 2019, 2020         FAMILY HISTORY     Family History   Problem Relation Age of Onset    Breast Cancer Mother          age 48    Heart Disease Father     Hypertension Father          VITALS     Visit Vitals  BP (!) 160/80 Left upper arm, Repeated end of exam 162/80 (BP 1 Location: Right upper arm)   Pulse 72   Temp 97.9 °F (36.6 °C) (Temporal)   Resp 18   Ht 5' 2\" (1.575 m)   Wt 143 lb (64.9 kg)   SpO2 98%   BMI 26.16 kg/m²          PHYSICAL EXAMINATION   Physical Exam  Vitals reviewed. Constitutional:       Appearance: Normal appearance. Cardiovascular:      Rate and Rhythm: Normal rate and regular rhythm. Heart sounds: Normal heart sounds. Pulmonary:      Effort: Pulmonary effort is normal.      Breath sounds: Normal breath sounds. Abdominal:      General: There is no distension. Palpations: Abdomen is soft. Tenderness: There is no abdominal tenderness. Musculoskeletal:         General: No swelling or tenderness. Right lower leg: No edema. Left lower leg: No edema. Skin:     General: Skin is warm and dry. Neurological:      General: No focal deficit present. Mental Status: She is alert and oriented to person, place, and time.       Gait: Gait normal.   Psychiatric:         Mood and Affect: Mood normal.                LABORATORY DATA/ANCILLARY/IMAGING     Results for orders placed or performed in visit on 21   AMB POC URINALYSIS DIP STICK AUTO W/O MICRO   Result Value Ref Range    Color (UA POC)      Clarity (UA POC)      Glucose (UA POC) Negative Negative    Bilirubin (UA POC) Negative Negative    Ketones (UA POC) Negative Negative    Specific gravity (UA POC) 1.010 1.001 - 1.035    Blood (UA POC) Trace Negative    pH (UA POC) 6.0 4.6 - 8.0    Protein (UA POC) Negative Negative    Urobilinogen (UA POC) 0.2 mg/dL 0.2 - 1    Nitrites (UA POC) Negative Negative    Leukocyte esterase (UA POC) 2+ Negative       Lab Results   Component Value Date/Time    GFR est non-AA 83 11/14/2019 10:05 AM    GFR est AA 95 11/14/2019 10:05 AM    Creatinine 0.73 11/14/2019 10:05 AM    BUN 12 11/14/2019 10:05 AM    Sodium 137 11/14/2019 10:05 AM    Potassium 4.2 11/14/2019 10:05 AM    Chloride 98 11/14/2019 10:05 AM    CO2 24 11/14/2019 10:05 AM         ASSESSMENT & PLAN       ICD-10-CM ICD-9-CM    1. Encounter to establish care  Z76.89 V65.8    2. Benign essential hypertension  I10 401.1 valsartan-hydroCHLOROthiazide (DIOVAN-HCT) 320-12.5 mg per tablet   3. Mixed dyslipidemia  E78.2 272.2    4. Prediabetes  R73.03 790.29    5. Bladder infection  N30.90 595.9 AMB POC URINALYSIS DIP STICK AUTO W/O MICRO      CULTURE, URINE      cephALEXin (KEFLEX) 250 mg capsule   6. Recurrent UTI  N39.0 599.0    7. Female bladder prolapse  N81.10 618.01      Patient had been seeing Dr. Pressley Sicard prior to her leaving clinic earlier this year. Labs were ordered on patient back at her AWV here in  but because the lab was so busy that day patient opted to leave and never returned to have those done. Cardiovascular risk and specific BP/BS/Hgba1c/lipid/LDL goals reviewed  Increase Diovan 160-12.5 mg to 320-12.5 mg once daily and continue interim home BP monitoring, log readings a few x a week  Have fasting labs done in 2-3 weeks, orders still pended in system  Reviewed medications, effects, and possible side effects in detail  Urine sent for C&S  Empirically go ahead and start Keflex 250 mg tid which she tolerated well last UTI  at [facility]  Increase water intake, limit caffeine intake  Dr. Salud Bernard is planning for robotic assisted prolapse repair w/ mesh  Further follow up & other recommendations pending review of labs and interim BP's w/ adjustment in Diovan-HCT as noted above.  She will call back sooner in the interim for problems or concerns

## 2021-06-24 DIAGNOSIS — I10 BENIGN ESSENTIAL HYPERTENSION: ICD-10-CM

## 2021-06-24 RX ORDER — VALSARTAN AND HYDROCHLOROTHIAZIDE 160; 12.5 MG/1; MG/1
TABLET, FILM COATED ORAL
Qty: 90 TABLET | OUTPATIENT
Start: 2021-06-24

## 2021-06-27 ENCOUNTER — TELEPHONE (OUTPATIENT)
Dept: FAMILY MEDICINE CLINIC | Age: 76
End: 2021-06-27

## 2021-06-28 NOTE — TELEPHONE ENCOUNTER
I sent patient's urine out for culture at her visit 6-21-21. I still have not gotten a result back. Please check w/ lab??? If it was missed we just need to call patient to see how her symptoms are doing and if she is better w/ taking the antibiotic, we wont bother to bring her back in.

## 2021-07-01 NOTE — TELEPHONE ENCOUNTER
Patient was left an voice message to contact our office to follow up on urinary symptoms.   Nancy Lira LPN

## 2021-07-07 ENCOUNTER — APPOINTMENT (OUTPATIENT)
Dept: FAMILY MEDICINE CLINIC | Age: 76
End: 2021-07-07

## 2021-07-07 DIAGNOSIS — R73.02 IMPAIRED GLUCOSE TOLERANCE: ICD-10-CM

## 2021-07-07 DIAGNOSIS — I10 BENIGN ESSENTIAL HYPERTENSION: ICD-10-CM

## 2021-07-07 LAB
ALBUMIN SERPL-MCNC: 4.1 G/DL (ref 3.5–5)
ALBUMIN/GLOB SERPL: 1.2 {RATIO} (ref 1.1–2.2)
ALP SERPL-CCNC: 86 U/L (ref 45–117)
ALT SERPL-CCNC: 28 U/L (ref 12–78)
ANION GAP SERPL CALC-SCNC: 6 MMOL/L (ref 5–15)
AST SERPL-CCNC: 17 U/L (ref 15–37)
BASOPHILS # BLD: 0 K/UL (ref 0–0.1)
BASOPHILS NFR BLD: 1 % (ref 0–1)
BILIRUB SERPL-MCNC: 0.7 MG/DL (ref 0.2–1)
BUN SERPL-MCNC: 16 MG/DL (ref 6–20)
BUN/CREAT SERPL: 19 (ref 12–20)
CALCIUM SERPL-MCNC: 9.3 MG/DL (ref 8.5–10.1)
CHLORIDE SERPL-SCNC: 106 MMOL/L (ref 97–108)
CHOLEST SERPL-MCNC: 207 MG/DL
CO2 SERPL-SCNC: 28 MMOL/L (ref 21–32)
CREAT SERPL-MCNC: 0.84 MG/DL (ref 0.55–1.02)
DIFFERENTIAL METHOD BLD: NORMAL
EOSINOPHIL # BLD: 0.1 K/UL (ref 0–0.4)
EOSINOPHIL NFR BLD: 1 % (ref 0–7)
ERYTHROCYTE [DISTWIDTH] IN BLOOD BY AUTOMATED COUNT: 13.2 % (ref 11.5–14.5)
EST. AVERAGE GLUCOSE BLD GHB EST-MCNC: 114 MG/DL
GLOBULIN SER CALC-MCNC: 3.3 G/DL (ref 2–4)
GLUCOSE SERPL-MCNC: 97 MG/DL (ref 65–100)
HBA1C MFR BLD: 5.6 % (ref 4–5.6)
HCT VFR BLD AUTO: 39.8 % (ref 35–47)
HDLC SERPL-MCNC: 63 MG/DL
HDLC SERPL: 3.3 {RATIO} (ref 0–5)
HGB BLD-MCNC: 13 G/DL (ref 11.5–16)
IMM GRANULOCYTES # BLD AUTO: 0 K/UL (ref 0–0.04)
IMM GRANULOCYTES NFR BLD AUTO: 0 % (ref 0–0.5)
LDLC SERPL CALC-MCNC: 115.2 MG/DL (ref 0–100)
LYMPHOCYTES # BLD: 1.3 K/UL (ref 0.8–3.5)
LYMPHOCYTES NFR BLD: 19 % (ref 12–49)
MCH RBC QN AUTO: 29.3 PG (ref 26–34)
MCHC RBC AUTO-ENTMCNC: 32.7 G/DL (ref 30–36.5)
MCV RBC AUTO: 89.6 FL (ref 80–99)
MONOCYTES # BLD: 0.6 K/UL (ref 0–1)
MONOCYTES NFR BLD: 9 % (ref 5–13)
NEUTS SEG # BLD: 4.7 K/UL (ref 1.8–8)
NEUTS SEG NFR BLD: 70 % (ref 32–75)
NRBC # BLD: 0 K/UL (ref 0–0.01)
NRBC BLD-RTO: 0 PER 100 WBC
PLATELET # BLD AUTO: 242 K/UL (ref 150–400)
PMV BLD AUTO: 10.5 FL (ref 8.9–12.9)
POTASSIUM SERPL-SCNC: 4.6 MMOL/L (ref 3.5–5.1)
PROT SERPL-MCNC: 7.4 G/DL (ref 6.4–8.2)
RBC # BLD AUTO: 4.44 M/UL (ref 3.8–5.2)
SODIUM SERPL-SCNC: 140 MMOL/L (ref 136–145)
TRIGL SERPL-MCNC: 144 MG/DL (ref ?–150)
VLDLC SERPL CALC-MCNC: 28.8 MG/DL
WBC # BLD AUTO: 6.7 K/UL (ref 3.6–11)

## 2021-07-21 DIAGNOSIS — N30.90 BLADDER INFECTION: ICD-10-CM

## 2021-07-21 RX ORDER — CEPHALEXIN 250 MG/1
250 CAPSULE ORAL 3 TIMES DAILY
Qty: 21 CAPSULE | Refills: 0 | Status: SHIPPED | OUTPATIENT
Start: 2021-07-21 | End: 2021-07-28

## 2021-08-15 DIAGNOSIS — I10 BENIGN ESSENTIAL HYPERTENSION: ICD-10-CM

## 2021-08-16 RX ORDER — AMLODIPINE BESYLATE 5 MG/1
TABLET ORAL
Qty: 30 TABLET | Refills: 0 | Status: SHIPPED | OUTPATIENT
Start: 2021-08-16 | End: 2021-09-15

## 2021-08-16 NOTE — TELEPHONE ENCOUNTER
Sent in 30 day supply. Patient overdue follow up BP and medication check. Please get scheduled as soon as possible. She is booked w/ me for  for routine but at her last visit in June, I adjusted her BP meds and need to see her sooner.

## 2021-09-11 ENCOUNTER — TELEPHONE (OUTPATIENT)
Dept: FAMILY MEDICINE CLINIC | Age: 76
End: 2021-09-11

## 2021-09-11 DIAGNOSIS — I10 BENIGN ESSENTIAL HYPERTENSION: ICD-10-CM

## 2021-09-11 RX ORDER — VALSARTAN AND HYDROCHLOROTHIAZIDE 320; 12.5 MG/1; MG/1
1 TABLET, FILM COATED ORAL DAILY
Qty: 90 TABLET | Refills: 0 | Status: SHIPPED | OUTPATIENT
Start: 2021-09-11 | End: 2021-12-18 | Stop reason: SDUPTHER

## 2021-09-11 NOTE — TELEPHONE ENCOUNTER
Ok thanks for reviewing/explaining that to her. I will look for a log of her readings and just see her in November. Sent in rx refill.

## 2021-09-11 NOTE — TELEPHONE ENCOUNTER
----- Message from Cony Stewart LPN sent at 7/07/2033 12:56 PM EDT -----  Regarding: FW: Non-Urgent Medical Question  Contact: [phone]  I called pt to explain that you had increased BP med and that is why she needed to come in. She states that her BP is doing fine and she will send in some readings. She saw ENT a couple of weeks ago and it was 140/82, the 156/77 was at her gyn. Her home readings are 130/70's range. I went a head and scheduled her for 9/28. She didn't know if she still needs to come in if she sends in home readings or wait until her AWV in Nov.  Told her I would get back with her.  ----- Message -----  From: Shireen Mclain  Sent: 9/10/2021  10:59 AM EDT  To: [facility] Nurse Pool  Subject: Non-Urgent Medical Question                      Dr. Estephanie Solo had appointments on 6/21/21 with you and  we have wellness visits on 11/26/21 . I don't understand the questioning on   my blood pressure meds.  Thank You Gloria Leonard ( [birth year] )  Last blood pressure checks were 140 / 82  156 / 77

## 2021-09-13 NOTE — TELEPHONE ENCOUNTER
LVM for pt that Nov appt fine and refill sent in. Asked her to send in the BP readings that she has along with pulse.

## 2021-09-15 DIAGNOSIS — I10 BENIGN ESSENTIAL HYPERTENSION: ICD-10-CM

## 2021-09-15 RX ORDER — AMLODIPINE BESYLATE 5 MG/1
TABLET ORAL
Qty: 90 TABLET | Refills: 0 | Status: SHIPPED | OUTPATIENT
Start: 2021-09-15 | End: 2021-12-13

## 2021-10-15 ENCOUNTER — HOSPITAL ENCOUNTER (INPATIENT)
Age: 76
LOS: 1 days | Discharge: HOME | DRG: 948 | End: 2021-10-16
Attending: EMERGENCY MEDICINE | Admitting: INTERNAL MEDICINE
Payer: MEDICARE

## 2021-10-15 ENCOUNTER — APPOINTMENT (OUTPATIENT)
Dept: GENERAL RADIOLOGY | Age: 76
DRG: 948 | End: 2021-10-15
Attending: EMERGENCY MEDICINE
Payer: MEDICARE

## 2021-10-15 ENCOUNTER — APPOINTMENT (OUTPATIENT)
Dept: CT IMAGING | Age: 76
DRG: 948 | End: 2021-10-15
Attending: FAMILY MEDICINE
Payer: MEDICARE

## 2021-10-15 DIAGNOSIS — R65.20 SEPSIS WITH ACUTE ORGAN DYSFUNCTION WITHOUT SEPTIC SHOCK, DUE TO UNSPECIFIED ORGANISM, UNSPECIFIED TYPE (HCC): Primary | ICD-10-CM

## 2021-10-15 DIAGNOSIS — A41.9 SEPSIS WITH ACUTE ORGAN DYSFUNCTION WITHOUT SEPTIC SHOCK, DUE TO UNSPECIFIED ORGANISM, UNSPECIFIED TYPE (HCC): Primary | ICD-10-CM

## 2021-10-15 LAB
ALBUMIN SERPL-MCNC: 4 G/DL (ref 3.5–5)
ALBUMIN/GLOB SERPL: 1.1 {RATIO} (ref 1.1–2.2)
ALP SERPL-CCNC: 83 U/L (ref 45–117)
ALT SERPL-CCNC: 25 U/L (ref 12–78)
ANION GAP SERPL CALC-SCNC: 5 MMOL/L (ref 5–15)
APPEARANCE UR: CLEAR
AST SERPL-CCNC: 18 U/L (ref 15–37)
BACTERIA URNS QL MICRO: NEGATIVE /HPF
BASOPHILS # BLD: 0 K/UL (ref 0–0.1)
BASOPHILS NFR BLD: 0 % (ref 0–1)
BILIRUB SERPL-MCNC: 0.9 MG/DL (ref 0.2–1)
BILIRUB UR QL CFM: NEGATIVE
BUN SERPL-MCNC: 15 MG/DL (ref 6–20)
BUN/CREAT SERPL: 15 (ref 12–20)
CALCIUM SERPL-MCNC: 9.2 MG/DL (ref 8.5–10.1)
CHLORIDE SERPL-SCNC: 103 MMOL/L (ref 97–108)
CO2 SERPL-SCNC: 24 MMOL/L (ref 21–32)
COLOR UR: ABNORMAL
COMMENT, HOLDF: NORMAL
COVID-19 RAPID TEST, COVR: NOT DETECTED
CREAT SERPL-MCNC: 1.02 MG/DL (ref 0.55–1.02)
DIFFERENTIAL METHOD BLD: ABNORMAL
EOSINOPHIL # BLD: 0 K/UL (ref 0–0.4)
EOSINOPHIL NFR BLD: 0 % (ref 0–7)
EPITH CASTS URNS QL MICRO: ABNORMAL /LPF
ERYTHROCYTE [DISTWIDTH] IN BLOOD BY AUTOMATED COUNT: 12.8 % (ref 11.5–14.5)
GLOBULIN SER CALC-MCNC: 3.5 G/DL (ref 2–4)
GLUCOSE SERPL-MCNC: 95 MG/DL (ref 65–100)
GLUCOSE UR STRIP.AUTO-MCNC: NEGATIVE MG/DL
HCT VFR BLD AUTO: 38.5 % (ref 35–47)
HGB BLD-MCNC: 13 G/DL (ref 11.5–16)
HGB UR QL STRIP: ABNORMAL
IMM GRANULOCYTES # BLD AUTO: 0.1 K/UL (ref 0–0.04)
IMM GRANULOCYTES NFR BLD AUTO: 1 % (ref 0–0.5)
KETONES UR QL STRIP.AUTO: ABNORMAL MG/DL
LACTATE SERPL-SCNC: 1.8 MMOL/L (ref 0.4–2)
LACTATE SERPL-SCNC: 2.3 MMOL/L (ref 0.4–2)
LACTATE SERPL-SCNC: 4.3 MMOL/L (ref 0.4–2)
LEUKOCYTE ESTERASE UR QL STRIP.AUTO: ABNORMAL
LYMPHOCYTES # BLD: 0.2 K/UL (ref 0.8–3.5)
LYMPHOCYTES NFR BLD: 2 % (ref 12–49)
MCH RBC QN AUTO: 30 PG (ref 26–34)
MCHC RBC AUTO-ENTMCNC: 33.8 G/DL (ref 30–36.5)
MCV RBC AUTO: 88.7 FL (ref 80–99)
MONOCYTES # BLD: 0.1 K/UL (ref 0–1)
MONOCYTES NFR BLD: 1 % (ref 5–13)
NEUTS SEG # BLD: 10.5 K/UL (ref 1.8–8)
NEUTS SEG NFR BLD: 96 % (ref 32–75)
NITRITE UR QL STRIP.AUTO: POSITIVE
NRBC # BLD: 0 K/UL (ref 0–0.01)
NRBC BLD-RTO: 0 PER 100 WBC
PH UR STRIP: 6.5 [PH] (ref 5–8)
PLATELET # BLD AUTO: 211 K/UL (ref 150–400)
PMV BLD AUTO: 10.6 FL (ref 8.9–12.9)
POTASSIUM SERPL-SCNC: 3.6 MMOL/L (ref 3.5–5.1)
PROT SERPL-MCNC: 7.5 G/DL (ref 6.4–8.2)
PROT UR STRIP-MCNC: ABNORMAL MG/DL
RBC # BLD AUTO: 4.34 M/UL (ref 3.8–5.2)
RBC #/AREA URNS HPF: ABNORMAL /HPF (ref 0–5)
RBC MORPH BLD: ABNORMAL
SAMPLES BEING HELD,HOLD: NORMAL
SODIUM SERPL-SCNC: 132 MMOL/L (ref 136–145)
SOURCE, COVRS: NORMAL
SP GR UR REFRACTOMETRY: 1.01 (ref 1–1.03)
UROBILINOGEN UR QL STRIP.AUTO: 1 EU/DL (ref 0.2–1)
WBC # BLD AUTO: 10.9 K/UL (ref 3.6–11)
WBC URNS QL MICRO: ABNORMAL /HPF (ref 0–4)
YEAST URNS QL MICRO: PRESENT

## 2021-10-15 PROCEDURE — 93005 ELECTROCARDIOGRAM TRACING: CPT

## 2021-10-15 PROCEDURE — 74176 CT ABD & PELVIS W/O CONTRAST: CPT

## 2021-10-15 PROCEDURE — 87635 SARS-COV-2 COVID-19 AMP PRB: CPT

## 2021-10-15 PROCEDURE — 99284 EMERGENCY DEPT VISIT MOD MDM: CPT

## 2021-10-15 PROCEDURE — 74011250636 HC RX REV CODE- 250/636: Performed by: FAMILY MEDICINE

## 2021-10-15 PROCEDURE — 74011000250 HC RX REV CODE- 250: Performed by: EMERGENCY MEDICINE

## 2021-10-15 PROCEDURE — 85025 COMPLETE CBC W/AUTO DIFF WBC: CPT

## 2021-10-15 PROCEDURE — 65660000001 HC RM ICU INTERMED STEPDOWN

## 2021-10-15 PROCEDURE — 74011250637 HC RX REV CODE- 250/637: Performed by: EMERGENCY MEDICINE

## 2021-10-15 PROCEDURE — 81001 URINALYSIS AUTO W/SCOPE: CPT

## 2021-10-15 PROCEDURE — 80053 COMPREHEN METABOLIC PANEL: CPT

## 2021-10-15 PROCEDURE — 83605 ASSAY OF LACTIC ACID: CPT

## 2021-10-15 PROCEDURE — 96374 THER/PROPH/DIAG INJ IV PUSH: CPT

## 2021-10-15 PROCEDURE — 87040 BLOOD CULTURE FOR BACTERIA: CPT

## 2021-10-15 PROCEDURE — 36415 COLL VENOUS BLD VENIPUNCTURE: CPT

## 2021-10-15 PROCEDURE — 87086 URINE CULTURE/COLONY COUNT: CPT

## 2021-10-15 PROCEDURE — 74011250636 HC RX REV CODE- 250/636: Performed by: EMERGENCY MEDICINE

## 2021-10-15 PROCEDURE — 71046 X-RAY EXAM CHEST 2 VIEWS: CPT

## 2021-10-15 PROCEDURE — 74011000250 HC RX REV CODE- 250: Performed by: FAMILY MEDICINE

## 2021-10-15 RX ORDER — SODIUM CHLORIDE 9 MG/ML
75 INJECTION, SOLUTION INTRAVENOUS CONTINUOUS
Status: DISCONTINUED | OUTPATIENT
Start: 2021-10-15 | End: 2021-10-16 | Stop reason: HOSPADM

## 2021-10-15 RX ORDER — ACETAMINOPHEN 325 MG/1
650 TABLET ORAL
Status: DISCONTINUED | OUTPATIENT
Start: 2021-10-15 | End: 2021-10-16 | Stop reason: HOSPADM

## 2021-10-15 RX ORDER — AMLODIPINE BESYLATE 5 MG/1
5 TABLET ORAL DAILY
Status: DISCONTINUED | OUTPATIENT
Start: 2021-10-16 | End: 2021-10-15

## 2021-10-15 RX ORDER — ONDANSETRON 2 MG/ML
4 INJECTION INTRAMUSCULAR; INTRAVENOUS
Status: DISCONTINUED | OUTPATIENT
Start: 2021-10-15 | End: 2021-10-16 | Stop reason: HOSPADM

## 2021-10-15 RX ORDER — ACETAMINOPHEN 500 MG
1000 TABLET ORAL ONCE
Status: COMPLETED | OUTPATIENT
Start: 2021-10-15 | End: 2021-10-15

## 2021-10-15 RX ORDER — SODIUM CHLORIDE 0.9 % (FLUSH) 0.9 %
5-40 SYRINGE (ML) INJECTION EVERY 8 HOURS
Status: DISCONTINUED | OUTPATIENT
Start: 2021-10-15 | End: 2021-10-16 | Stop reason: HOSPADM

## 2021-10-15 RX ORDER — SODIUM CHLORIDE 0.9 % (FLUSH) 0.9 %
5-40 SYRINGE (ML) INJECTION AS NEEDED
Status: DISCONTINUED | OUTPATIENT
Start: 2021-10-15 | End: 2021-10-16 | Stop reason: HOSPADM

## 2021-10-15 RX ORDER — LEVOFLOXACIN 5 MG/ML
750 INJECTION, SOLUTION INTRAVENOUS
Status: DISCONTINUED | OUTPATIENT
Start: 2021-10-15 | End: 2021-10-16 | Stop reason: HOSPADM

## 2021-10-15 RX ORDER — LORATADINE 10 MG/1
10 TABLET ORAL DAILY
Status: DISCONTINUED | OUTPATIENT
Start: 2021-10-16 | End: 2021-10-16 | Stop reason: HOSPADM

## 2021-10-15 RX ADMIN — FAMOTIDINE 20 MG: 10 INJECTION, SOLUTION INTRAVENOUS at 19:54

## 2021-10-15 RX ADMIN — LEVOFLOXACIN 750 MG: 5 INJECTION, SOLUTION INTRAVENOUS at 19:59

## 2021-10-15 RX ADMIN — CEFTRIAXONE SODIUM 1 G: 1 INJECTION, POWDER, FOR SOLUTION INTRAMUSCULAR; INTRAVENOUS at 16:36

## 2021-10-15 RX ADMIN — ACETAMINOPHEN 1000 MG: 500 TABLET ORAL at 15:53

## 2021-10-15 RX ADMIN — SODIUM CHLORIDE 1000 ML: 9 INJECTION, SOLUTION INTRAVENOUS at 16:22

## 2021-10-15 RX ADMIN — SODIUM CHLORIDE 75 ML/HR: 900 INJECTION, SOLUTION INTRAVENOUS at 20:00

## 2021-10-15 RX ADMIN — METHYLPREDNISOLONE SODIUM SUCCINATE 60 MG: 125 INJECTION, POWDER, FOR SOLUTION INTRAMUSCULAR; INTRAVENOUS at 19:07

## 2021-10-15 RX ADMIN — SODIUM CHLORIDE 1000 ML: 9 INJECTION, SOLUTION INTRAVENOUS at 15:44

## 2021-10-15 RX ADMIN — Medication 10 ML: at 21:24

## 2021-10-15 RX ADMIN — SODIUM CHLORIDE 1000 ML: 9 INJECTION, SOLUTION INTRAVENOUS at 19:07

## 2021-10-15 NOTE — ED NOTES
Patient was taken by restroom on the way to room 18 but was unable to collect sample. Requesting IVF prior to attempting straight cath for urine.

## 2021-10-15 NOTE — PROGRESS NOTES
Clinical Pharmacy Note: Antibiotic Renal Dosing    Medication: Levaquin 750 mg Q24 hours  Indication:  UTI (note, allergy to Cipro = nausea only)    Recent Labs     10/15/21  1446   WBC 10.9   CREA 1.02   BUN 15     Temp (24hrs), Av.3 °F (37.4 °C), Min:98 °F (36.7 °C), Max:100.9 °F (38.3 °C)    Est CrCl: 40-45 ml/min     Plan: Change to 750 mg every 48 hours per [facility] P&T approved renal dosing protocol. Pharmacy will monitor daily and will make adjustments as necessary for changes in renal function.

## 2021-10-15 NOTE — H&P
History & Physical    Primary Care Provider: Lourdes Allen MD  Source of Information: Patient     History of Presenting Illness:   Maggy Kim is a 78 y.o. female who presents with facial flushing    History was primarily obtained from the patient    Patient reports that she was recently diagnosed with a bladder infection, was prescribed nitrofurantoin, took 2 doses nitrofurantoin and started having some facial flushing associated with one bout of vomiting, some loose stools, lower abdominal pain, and nausea, got concerned and decided to come to the hospital, in the ER patient was found to be mildly hypotensive and having elevated lactic acid and was requested to be admitted to the hospital service. When I went to evaluate the patient she reports that she was feeling well/         The patient denies any Headache, blurry vision, sore throat, trouble swallowing, trouble with speech, chest pain, SOB, cough, fever, chills,  abd pain, urinary symptoms, constipation, recent travels, sick contacts, focal or generalized neurological symptoms,  falls, injuries, rashes, contact with COVID-19 diagnosed patients, hematemesis, melena, hemoptysis, hematuria, rashes, denies starting any new medications and denies any other concerns or problems besides as mentioned above. Review of Systems:  A comprehensive review of systems was negative except for that written in the History of Present Illness.    All other systems reviewed, pertinent positives and negatives noted in HPI    Past Medical History:   Diagnosis Date    Benign essential hypertension 10/10/2016    Colonoscopy & Mammogram refused 10/10/2016    Dyslipidemia (high LDL; low HDL)     Full dentures     History of tobacco use     Mixed dyslipidemia     Low HDL     Prediabetes 6/21/2021      Past Surgical History:   Procedure Laterality Date    HX GYN      D&C about 1976    HX TOTAL VAGINAL HYSTERECTOMY 2020    Prolapse    HX TYMPANOSTOMY Right ~    Dr Romero Breeding  2020    Prolapse, Dr. Kevin Mayorga     Prior to Admission medications    Medication Sig Start Date End Date Taking? Authorizing Provider   amLODIPine (NORVASC) 5 mg tablet TAKE 1 TABLET BY MOUTH DAILY 9/15/21   Jamie Bajwa MD   valsartan-hydroCHLOROthiazide (DIOVAN-HCT) 320-12.5 mg per tablet Take 1 Tablet by mouth daily. 21   Jamie Bajwa MD   tiZANidine (ZANAFLEX) 2 mg capsule Take 2 mg by mouth three (3) times daily. Provider, Historical   triamcinolone acetonide (KENALOG) 0.1 % ointment Apply  to affected area two (2) times a day. use thin layer    Provider, Historical   biotin 1,000 mcg chew Take  by mouth daily. Provider, Historical   loratadine (CLARITIN) 10 mg tablet Take 10 mg by mouth. Provider, Historical   DOCOSAHEXANOIC ACID/EPA (FISH OIL PO) Take 1,000 mg by mouth daily. Provider, Historical     Allergies   Allergen Reactions    Ciprofloxacin Nausea Only    Macrobid [Nitrofurantoin Monohyd/M-Cryst] Nausea Only     10/15/21:     Pen-Vee K Itching     Keflex OK      Family History   Problem Relation Age of Onset   [facility] Breast Cancer Mother          age 48    Heart Disease Father     Hypertension Father       Family history was discussed with the patient, all pertinent and relevant details are mentioned as above, no other pertinent and relevant family history was noted on my discussion with the patient.   Patient specifically denies any history of Gaucher disease in the family  SOCIAL HISTORY:  Patient resides:  Independently x   Assisted Living    SNF    With family care       Smoking history:   None    Former x   Chronic      Alcohol history:   None    Social x   Chronic      Ambulates:   Independently x   w/cane    w/walker    w/wc    CODE STATUS:  DNR    Full x   Other      Objective:     Physical Exam:     Visit Vitals  BP (!) 102/45 (BP 1 Location: Left upper arm, BP Patient Position: At rest)   Pulse 95   Temp 99.8 °F (37.7 °C)   Resp 24   SpO2 96%      O2 Device: None    General : alert x 3, awake, no acute distress, resting in bed, pleasant /female, appears to be stated age  HEENT: PEERL, EOMI, moist mucus membrane, TM clear  Neck: supple, no JVD, no meningeal signs  Chest: Clear to auscultation bilaterally   CVS: S1 S2 heard, Capillary refill less than 2 seconds  Abd: soft/ Non tender, non distended, BS physiological,   Ext: no clubbing, no cyanosis, no edema, brisk 2+ DP pulses  Neuro/Psych: pleasant mood and affect, CN 2-12 grossly intact, sensory grossly within normal limit, Strength 5/5 in all extremities, DTR 1+ x 4  Skin: warm     EKG: Pending      Data Review:     Recent Days:  Recent Labs     10/15/21  1446   WBC 10.9   HGB 13.0   HCT 38.5        Recent Labs     10/15/21  1446   *   K 3.6      CO2 24   GLU 95   BUN 15   CREA 1.02   CA 9.2   ALB 4.0   ALT 25     No results for input(s): PH, PCO2, PO2, HCO3, FIO2 in the last 72 hours. 24 Hour Results:  Recent Results (from the past 24 hour(s))   CBC WITH AUTOMATED DIFF    Collection Time: 10/15/21  2:46 PM   Result Value Ref Range    WBC 10.9 3.6 - 11.0 K/uL    RBC 4.34 3.80 - 5.20 M/uL    HGB 13.0 11.5 - 16.0 g/dL    HCT 38.5 35.0 - 47.0 %    MCV 88.7 80.0 - 99.0 FL    MCH 30.0 26.0 - 34.0 PG    MCHC 33.8 30.0 - 36.5 g/dL    RDW 12.8 11.5 - 14.5 %    PLATELET 259 921 - 638 K/uL    MPV 10.6 8.9 - 12.9 FL    NRBC 0.0 0  WBC    ABSOLUTE NRBC 0.00 0.00 - 0.01 K/uL    NEUTROPHILS 96 (H) 32 - 75 %    LYMPHOCYTES 2 (L) 12 - 49 %    MONOCYTES 1 (L) 5 - 13 %    EOSINOPHILS 0 0 - 7 %    BASOPHILS 0 0 - 1 %    IMMATURE GRANULOCYTES 1 (H) 0.0 - 0.5 %    ABS. NEUTROPHILS 10.5 (H) 1.8 - 8.0 K/UL    ABS. LYMPHOCYTES 0.2 (L) 0.8 - 3.5 K/UL    ABS. MONOCYTES 0.1 0.0 - 1.0 K/UL    ABS. EOSINOPHILS 0.0 0.0 - 0.4 K/UL    ABS. BASOPHILS 0.0 0.0 - 0.1 K/UL    ABS. IMM.  GRANS. 0.1 (H) 0.00 - 0.04 K/UL DF SMEAR SCANNED      RBC COMMENTS NORMOCYTIC, NORMOCHROMIC     METABOLIC PANEL, COMPREHENSIVE    Collection Time: 10/15/21  2:46 PM   Result Value Ref Range    Sodium 132 (L) 136 - 145 mmol/L    Potassium 3.6 3.5 - 5.1 mmol/L    Chloride 103 97 - 108 mmol/L    CO2 24 21 - 32 mmol/L    Anion gap 5 5 - 15 mmol/L    Glucose 95 65 - 100 mg/dL    BUN 15 6 - 20 MG/DL    Creatinine 1.02 0.55 - 1.02 MG/DL    BUN/Creatinine ratio 15 12 - 20      GFR est AA >60 >60 ml/min/1.73m2    GFR est non-AA 53 (L) >60 ml/min/1.73m2    Calcium 9.2 8.5 - 10.1 MG/DL    Bilirubin, total 0.9 0.2 - 1.0 MG/DL    ALT (SGPT) 25 12 - 78 U/L    AST (SGOT) 18 15 - 37 U/L    Alk. phosphatase 83 45 - 117 U/L    Protein, total 7.5 6.4 - 8.2 g/dL    Albumin 4.0 3.5 - 5.0 g/dL    Globulin 3.5 2.0 - 4.0 g/dL    A-G Ratio 1.1 1.1 - 2.2     SAMPLES BEING HELD    Collection Time: 10/15/21  2:46 PM   Result Value Ref Range    SAMPLES BEING HELD 1RED     COMMENT        Add-on orders for these samples will be processed based on acceptable specimen integrity and analyte stability, which may vary by analyte. LACTIC ACID    Collection Time: 10/15/21  2:46 PM   Result Value Ref Range    Lactic acid 4.3 (HH) 0.4 - 2.0 MMOL/L   URINALYSIS W/MICROSCOPIC    Collection Time: 10/15/21  4:16 PM   Result Value Ref Range    Color DARK YELLOW      Appearance CLEAR CLEAR      Specific gravity 1.010 1.003 - 1.030      pH (UA) 6.5 5.0 - 8.0      Protein TRACE (A) NEG mg/dL    Glucose Negative NEG mg/dL    Ketone TRACE (A) NEG mg/dL    Blood TRACE (A) NEG      Urobilinogen 1.0 0.2 - 1.0 EU/dL    Nitrites Positive (A) NEG      Leukocyte Esterase MODERATE (A) NEG      WBC 5-10 0 - 4 /hpf    RBC 0-5 0 - 5 /hpf    Epithelial cells FEW FEW /lpf    Bacteria Negative NEG /hpf    Yeast PRESENT (A) NEG     BILIRUBIN, CONFIRM    Collection Time: 10/15/21  4:16 PM   Result Value Ref Range    Bilirubin UA, confirm Negative NEG           Imaging:   . XR CHEST PA LAT    Result Date: 10/15/2021  No acute process. No pneumonia     Assessment/Plan     Allergic reaction  Labile blood pressure  UTI  Lactic acidosis    Patient likely with allergic reaction, 1 dose of Solu-Medrol, Pepcid, close monitoring, no shortness of breath or concern for anaphylaxis does not need criteria for sepsis, clinically does not appear to be septic, monitor on telemetry, reassess as needed  Patient with labile blood pressure, currently hypertensive, IV hydration and monitor  V hydration, broad-spectrum IV antibiotics, UA and urine culture, supportive care close monitoring and further intervention per hospital course, repeat lactate, monitor  Repeat lactate    GI DVT prophylaxis: Patient will be on heparin             Please note that this dictation was completed with [employer], the computer voice recognition software. Quite often unanticipated grammatical, syntax, homophones, and other interpretive errors are inadvertently transcribed by the computer software. Please disregard these errors. Please excuse any errors that have escaped final proofreading.          Signed By: Rodney Chi MD     October 15, 2021

## 2021-10-15 NOTE — ED PROVIDER NOTES
Patient is a 77-year-old woman who comes into the emergency department with facial flushing that she is concerned may be an allergic reaction to nitrofurantoin. Patient reports that she was diagnosed with a urinary tract infection yesterday and started on Macrobid; she has had 2 doses. She describes nausea, vomiting x1, diarrhea, lower abdominal pain, and facial flushing. She has some mild shortness of breath but denies any swelling, hives, or throat tightening. The history is provided by the patient.         Past Medical History:   Diagnosis Date    Benign essential hypertension 10/10/2016    Colonoscopy & Mammogram refused 10/10/2016    Dyslipidemia (high LDL; low HDL)     Full dentures     History of tobacco use     Mixed dyslipidemia     Low HDL     Prediabetes 2021       Past Surgical History:   Procedure Laterality Date    HX GYN      D&C about     HX TOTAL VAGINAL HYSTERECTOMY  2020    Prolapse    HX TYMPANOSTOMY Right ~    Dr Lena Pérez  2020    Prolapse, Dr. Yon Wilcox         Family History:   Problem Relation Age of Onset   [employer] Breast Cancer Mother          age 46    Heart Disease Father     Hypertension Father        Social History     Socioeconomic History    Marital status:      Spouse name: Not on file    Number of children: 2    Years of education: Not on file    Highest education level: Not on file   Occupational History    Not on file   Tobacco Use    Smoking status: Former Smoker     Packs/day: 1.00     Years: 25.00     Pack years: 25.00     Quit date: 2008     Years since quittin.8    Smokeless tobacco: Never Used   Substance and Sexual Activity    Alcohol use: No    Drug use: No    Sexual activity: Yes     Partners: Male   Other Topics Concern     Service Not Asked    Blood Transfusions Not Asked    Caffeine Concern No     Comment: 1 mug of coffee a day; 3 glasses of green tea a day    Occupational Exposure Not Asked    Hobby Hazards Not Asked    Sleep Concern Not Asked    Stress Concern Not Asked    Weight Concern No     Comment: stable in the 140's    Special Diet Yes     Comment: eating more fruits and vegetables    Back Care Not Asked    Exercise No     Comment: just stays active around the house    Bike Helmet Not Asked   [place] Not Asked    Self-Exams Not Asked   Social History Narrative    , retired, 2 children    Lives with , Ana Reilly    Diet: regular    Exercise: just started stationary bike 3-4 x a week x 10 minutes and walking in neighborhood w/     Caffeine: 2 cups of coffee a day, several glasses of green tea a day    Weight: has gained some weight since Covid pandemic 3-2020         Social Determinants of Health     Financial Resource Strain:     Difficulty of Paying Living Expenses:    Food Insecurity:     Worried About Running Out of Food in the Last Year:     [address] in the Last Year:    Transportation Needs:     Lack of Transportation (Medical):  Lack of Transportation (Non-Medical):    Physical Activity:     Days of Exercise per Week:     Minutes of Exercise per Session:    Stress:     Feeling of Stress :    Social Connections:     Frequency of Communication with Friends and Family:     Frequency of Social Gatherings with Friends and Family:     Attends J[employer] Services:     Active Member of Clubs or Organizations:     Attends Club or Organization Meetings:     Marital Status:    Intimate Partner Violence:     Fear of Current or Ex-Partner:     Emotionally Abused:     Physically Abused:     Sexually Abused: ALLERGIES: Ciprofloxacin, Macrobid [nitrofurantoin monohyd/m-cryst], and Pen-vee k    Review of Systems   Constitutional: Positive for fatigue. Negative for chills and fever. Respiratory: Positive for shortness of breath. Negative for wheezing and stridor. Cardiovascular: Negative for chest pain. Gastrointestinal: Positive for abdominal pain, diarrhea, nausea and vomiting. Negative for constipation. Genitourinary: Positive for dysuria. Skin: Positive for color change. Neurological: Negative for dizziness and light-headedness. All other systems reviewed and are negative. Vitals:    10/15/21 1313 10/15/21 1415   BP: 113/66 94/64   Pulse: 98 (!) 106   Resp: 16 22   Temp: 98.3 °F (36.8 °C) (!) 100.9 °F (38.3 °C)   SpO2: 97% 97%            Physical Exam  Vitals and nursing note reviewed. Constitutional:       Appearance: She is well-developed. HENT:      Head: Normocephalic and atraumatic. Eyes:      Pupils: Pupils are equal, round, and reactive to light. Cardiovascular:      Rate and Rhythm: Normal rate and regular rhythm. Pulmonary:      Effort: Pulmonary effort is normal. Tachypnea present. Breath sounds: Normal breath sounds. Abdominal:      General: There is no distension. Palpations: Abdomen is soft. Tenderness: There is abdominal tenderness in the suprapubic area. There is no right CVA tenderness or left CVA tenderness. Musculoskeletal:      Cervical back: Normal range of motion and neck supple. Skin:     General: Skin is warm and dry. Capillary Refill: Capillary refill takes less than 2 seconds. Neurological:      General: No focal deficit present. Mental Status: She is alert and oriented to person, place, and time. Psychiatric:         Mood and Affect: Mood normal.         Behavior: Behavior normal.          MDM       Procedures      MEDICAL DECISION MAKIN y.o. female presents with Allergic Reaction    Pt presents with sepsis. Received abx and IV fluids with improvement in vital signs. Admitted to the hospital for additional management.   DDX:  PNA, pericarditis, electrolyte abnormality, dehydration, influenza, UTI, pyelonephritis, gastroenteritis, diverticulitis, cholecystitis          LABORATORY TESTS:  Labs Reviewed   CBC WITH AUTOMATED DIFF - Abnormal; Notable for the following components:       Result Value    NEUTROPHILS 96 (*)     LYMPHOCYTES 2 (*)     MONOCYTES 1 (*)     IMMATURE GRANULOCYTES 1 (*)     ABS. NEUTROPHILS 10.5 (*)     ABS. LYMPHOCYTES 0.2 (*)     ABS. IMM. GRANS. 0.1 (*)     All other components within normal limits   METABOLIC PANEL, COMPREHENSIVE - Abnormal; Notable for the following components:    Sodium 132 (*)     GFR est non-AA 53 (*)     All other components within normal limits   LACTIC ACID - Abnormal; Notable for the following components:    Lactic acid 4.3 (*)     All other components within normal limits   CULTURE, BLOOD, PAIRED   URINE CULTURE HOLD SAMPLE   CULTURE, URINE   URINALYSIS W/MICROSCOPIC   SAMPLES BEING HELD       IMAGING RESULTS:  XR CHEST PA LAT   Final Result   No acute process. No pneumonia             MEDICATIONS GIVEN:  Medications   sodium chloride 0.9 % bolus infusion 1,000 mL (has no administration in time range)   cefTRIAXone (ROCEPHIN) 1 g in sterile water (preservative free) 10 mL IV syringe (has no administration in time range)   sodium chloride 0.9 % bolus infusion 1,000 mL (1,000 mL IntraVENous New Bag 10/15/21 1544)   acetaminophen (TYLENOL) tablet 1,000 mg (1,000 mg Oral Given 10/15/21 1553)       PROGRESS NOTE:   The patient's ED course has been uncomplicated    CONSULTS:  Hospitalist Consult: [address] [place] [address] for Admission  4:24 PM    ED Room Number: ER18/18  Patient Name and age:  Levi Traore 78 y.o.  female  Working Diagnosis:   1.  Sepsis with acute organ dysfunction without septic shock, due to unspecified organism, unspecified [facility]        COVID-19 Suspicion:  no  Sepsis present:  yes  Reassessment needed: yes  Code Status:  Full Code  Readmission: no  Isolation Requirements:  no  Recommended Level of Care:  telemetry  Department:[facility] Adult ED - 21   Other:  Started macrobid for a UTI yesterday    PLAN:  - Admit to hospitalist    Total critical care time spent exclusive of procedures:  35 minutes    Silverio Purcell MD          Please note that this dictation was completed with [employer], the computer voice recognition software. Quite often unanticipated grammatical, syntax, homophones, and other interpretive errors are inadvertently transcribed by the computer software. Please disregard these errors. Please excuse any errors that have escaped final proofreading.

## 2021-10-15 NOTE — ED NOTES
MD Trevor Guaman made aware of soft BP's after NS boluses. Order for additional NS bolus and to give solumedrol and pepcid in signed and held orders.

## 2021-10-15 NOTE — ED TRIAGE NOTES
Triage: Pt arrives ambulatory from home reporting an allergic reaction to Nitrofurantoin. She was started on it for UTI. She took a dose last night and a dose this morning. She reports face flushing and loss of appetite. She denies throat swelling or lip tingling.

## 2021-10-15 NOTE — PROGRESS NOTES
Pharmacy Renal Dosing Note    Famotidine ordered 20 mg every 12 hours has been changed to 20 mg every 24 hours for CrCl < 50 mL/min per P&T approved pharmacy protocol. Please contact inpatient pharmacy at x [phone] with any questions. Thank you.

## 2021-10-16 VITALS
HEART RATE: 77 BPM | SYSTOLIC BLOOD PRESSURE: 131 MMHG | RESPIRATION RATE: 22 BRPM | OXYGEN SATURATION: 97 % | TEMPERATURE: 98.1 F | DIASTOLIC BLOOD PRESSURE: 66 MMHG

## 2021-10-16 PROBLEM — T78.40XA ALLERGIC REACTION: Status: ACTIVE | Noted: 2021-10-16

## 2021-10-16 LAB
ANION GAP SERPL CALC-SCNC: 6 MMOL/L (ref 5–15)
ATRIAL RATE: 75 BPM
BACTERIA SPEC CULT: NORMAL
BASOPHILS # BLD: 0 K/UL (ref 0–0.1)
BASOPHILS NFR BLD: 0 % (ref 0–1)
BUN SERPL-MCNC: 12 MG/DL (ref 6–20)
BUN/CREAT SERPL: 16 (ref 12–20)
CALCIUM SERPL-MCNC: 7.2 MG/DL (ref 8.5–10.1)
CALCULATED P AXIS, ECG09: 53 DEGREES
CALCULATED R AXIS, ECG10: 34 DEGREES
CALCULATED T AXIS, ECG11: 101 DEGREES
CHLORIDE SERPL-SCNC: 116 MMOL/L (ref 97–108)
CO2 SERPL-SCNC: 20 MMOL/L (ref 21–32)
CREAT SERPL-MCNC: 0.77 MG/DL (ref 0.55–1.02)
DIAGNOSIS, 93000: NORMAL
DIFFERENTIAL METHOD BLD: ABNORMAL
EOSINOPHIL # BLD: 0 K/UL (ref 0–0.4)
EOSINOPHIL NFR BLD: 0 % (ref 0–7)
ERYTHROCYTE [DISTWIDTH] IN BLOOD BY AUTOMATED COUNT: 13 % (ref 11.5–14.5)
GLUCOSE SERPL-MCNC: 163 MG/DL (ref 65–100)
HCT VFR BLD AUTO: 28.9 % (ref 35–47)
HGB BLD-MCNC: 9.9 G/DL (ref 11.5–16)
IMM GRANULOCYTES # BLD AUTO: 0.1 K/UL (ref 0–0.04)
IMM GRANULOCYTES NFR BLD AUTO: 1 % (ref 0–0.5)
LACTATE SERPL-SCNC: 1.5 MMOL/L (ref 0.4–2)
LACTATE SERPL-SCNC: 1.8 MMOL/L (ref 0.4–2)
LYMPHOCYTES # BLD: 0.2 K/UL (ref 0.8–3.5)
LYMPHOCYTES NFR BLD: 2 % (ref 12–49)
MCH RBC QN AUTO: 30.4 PG (ref 26–34)
MCHC RBC AUTO-ENTMCNC: 34.3 G/DL (ref 30–36.5)
MCV RBC AUTO: 88.7 FL (ref 80–99)
MONOCYTES # BLD: 0.1 K/UL (ref 0–1)
MONOCYTES NFR BLD: 1 % (ref 5–13)
NEUTS SEG # BLD: 10 K/UL (ref 1.8–8)
NEUTS SEG NFR BLD: 96 % (ref 32–75)
NRBC # BLD: 0 K/UL (ref 0–0.01)
NRBC BLD-RTO: 0 PER 100 WBC
P-R INTERVAL, ECG05: 122 MS
PLATELET # BLD AUTO: 156 K/UL (ref 150–400)
PMV BLD AUTO: 10 FL (ref 8.9–12.9)
POTASSIUM SERPL-SCNC: 3.2 MMOL/L (ref 3.5–5.1)
Q-T INTERVAL, ECG07: 400 MS
QRS DURATION, ECG06: 102 MS
QTC CALCULATION (BEZET), ECG08: 446 MS
RBC # BLD AUTO: 3.26 M/UL (ref 3.8–5.2)
RBC MORPH BLD: ABNORMAL
SERVICE CMNT-IMP: NORMAL
SODIUM SERPL-SCNC: 142 MMOL/L (ref 136–145)
VENTRICULAR RATE, ECG03: 75 BPM
WBC # BLD AUTO: 10.4 K/UL (ref 3.6–11)

## 2021-10-16 PROCEDURE — 80048 BASIC METABOLIC PNL TOTAL CA: CPT

## 2021-10-16 PROCEDURE — 83605 ASSAY OF LACTIC ACID: CPT

## 2021-10-16 PROCEDURE — 74011250636 HC RX REV CODE- 250/636: Performed by: FAMILY MEDICINE

## 2021-10-16 PROCEDURE — 85025 COMPLETE CBC W/AUTO DIFF WBC: CPT

## 2021-10-16 PROCEDURE — 74011250637 HC RX REV CODE- 250/637: Performed by: INTERNAL MEDICINE

## 2021-10-16 PROCEDURE — 74011250637 HC RX REV CODE- 250/637: Performed by: FAMILY MEDICINE

## 2021-10-16 PROCEDURE — 36415 COLL VENOUS BLD VENIPUNCTURE: CPT

## 2021-10-16 RX ORDER — POTASSIUM CHLORIDE 750 MG/1
40 TABLET, FILM COATED, EXTENDED RELEASE ORAL ONCE
Status: COMPLETED | OUTPATIENT
Start: 2021-10-16 | End: 2021-10-16

## 2021-10-16 RX ORDER — LEVOFLOXACIN 750 MG/1
750 TABLET ORAL DAILY
Qty: 3 TABLET | Refills: 0 | Status: SHIPPED | OUTPATIENT
Start: 2021-10-16 | End: 2021-10-19

## 2021-10-16 RX ADMIN — SODIUM CHLORIDE 100 ML/HR: 900 INJECTION, SOLUTION INTRAVENOUS at 00:02

## 2021-10-16 RX ADMIN — LORATADINE 10 MG: 10 TABLET ORAL at 10:38

## 2021-10-16 RX ADMIN — Medication 10 ML: at 07:24

## 2021-10-16 RX ADMIN — POTASSIUM CHLORIDE 40 MEQ: 750 TABLET, FILM COATED, EXTENDED RELEASE ORAL at 10:38

## 2021-10-16 NOTE — PROGRESS NOTES
2125: TRANSFER - IN REPORT:    Verbal report received from [facility]) on [address]  being received from ED(unit) for routine progression of care      Report consisted of patients Situation, Background, Assessment and   Recommendations(SBAR). Information from the following report(s) SBAR, Kardex, Intake/Output, MAR and Recent Results was reviewed with the receiving nurse. Opportunity for questions and clarification was provided. Assessment completed upon patients arrival to unit and care assumed. 2240: Critical lactic of 2.3. Dr. Nyla Ruelas aware. Orders to change NS rate to 100 ml/hr    0730: Bedside shift change report given to Woody RN (oncoming nurse) by Juno Cote RN (offgoing nurse). Report included the following information SBAR, Kardex, Procedure Summary, Intake/Output, MAR and Recent Results.

## 2021-10-16 NOTE — DISCHARGE SUMMARY
Discharge Summary     PATIENT ID: Layla Bettencourt  MRN: [id number]   YOB: 1945    DATE OF ADMISSION: 10/15/2021  2:06 PM    DATE OF DISCHARGE: 10/16/2021  PRIMARY CARE PROVIDER: Artis Ascencio MD   DISCHARGING PROVIDER: Julius Ochoa MD    To contact this individual call [phone] and ask the  to page. If unavailable ask to be transferred the Adult Hospitalist Department. CONSULTATIONS: None    PROCEDURES/SURGERIES: * No surgery found *    ADMITTING DIAGNOSES & HOSPITAL COURSE:   Layla Bettencourt is a 78 y.o. female who presents with facial flushing   History was primarily obtained from the patient   Patient reports that she was recently diagnosed with a bladder infection, was prescribed nitrofurantoin, took 2 doses nitrofurantoin and started having some facial flushing associated with one bout of vomiting, some loose stools, lower abdominal pain, and nausea, got concerned and decided to come to the hospital, in the ER patient was found to be mildly hypotensive and having elevated lactic acid and was requested to be admitted to the hospital service.   When I went to evaluate the patient she reports that she was feeling well    DISCHARGE DIAGNOSES / PLAN:      Allergic reaction to macrobid with facial flushing- resolved  No throat, tongue or lip swelling  S/p solumedrol, pepcid  Hypotension and fever on admission likely reactive- resolved  Elevated lactate-resolved  Urine cx pending    Recent UTI, given macrobid  Symptoms resolved  Complete course on levaquin, will call pt if cx returns resistant to levaquin    HTN controlled    Hypokalemia- replete     ADDITIONAL CARE RECOMMENDATIONS: f/u PCP    PENDING TEST RESULTS:   At the time of discharge the following test results are still pending: UC    Patient Instructions     FOLLOW UP APPOINTMENTS:    Follow-up Information    None        DIET: Regular Diet    ACTIVITY: Activity as tolerated    NOTIFY [address] FOLLOWING:   Fever over 101 degrees for 24 hours. Chest pain, shortness of breath, fever, chills, nausea, vomiting, diarrhea, change in mentation, falling, weakness, bleeding. Severe pain or pain not relieved by medications. Or, any other signs or symptoms that you may have questions about. DISPOSITION:  x  Home With:   OT  PT  HH  RN       Long term SNF/Inpatient Rehab    Independent/assisted living    Hospice    Other:       PATIENT CONDITION AT DISCHARGE:   Functional status    Poor     Deconditioned    x Independent      Cognition   x  Lucid     Forgetful     Dementia      Catheters/lines (plus indication)    Leon     PICC     PEG    x None      Code status   x  Full code     DNR      PHYSICAL EXAMINATION AT DISCHARGE:  General:  Alert, cooperative, no distress  Back:    Symmetric, ROM normal  Lungs:   Clear to auscultation bilaterally, symmetric expansion, no respiratory distress  Chest wall:  No tenderness or deformity  Heart:   Regular rate and rhythm  Abdomen:   Soft, non-tender  Extremities: Extremities normal, atraumatic, no cyanosis or edema  Skin:  Skin color, texture, turgor normal. No rashes or lesions  Neurologic: CNII-XII intact. DON.  A&O    CHRONIC MEDICAL DIAGNOSES:  Problem List as of 10/16/2021 Date Reviewed: 6/21/2021        Codes Class Noted - Resolved    Sepsis ([facility] [address] ICD-10-CM: A41.9  ICD-9-CM: 038.9, 995.91  10/15/2021 - Present        Prediabetes ICD-10-CM: R73.03  ICD-9-CM: 790.29  6/21/2021 - Present        Osteopenia of multiple sites ICD-10-CM: M85.89  ICD-9-CM: 733.90  12/23/2019 - Present        BMI 26.0-26.9,adult ICD-10-CM: S19.63  ICD-9-CM: V85.22  4/27/2018 - Present        Impaired glucose tolerance ICD-10-CM: R73.02  ICD-9-CM: 790.22  10/9/2017 - Present        Needle phobia ICD-10-CM: F40.298  ICD-9-CM: 300.29  7/19/2017 - Present        Female genital prolapse ICD-10-CM: N81.9  ICD-9-CM: 618.9  7/19/2017 - Present        Benign essential hypertension ICD-10-CM: I10  ICD-9-CM: 401.1  10/10/2016 - Present        Colonoscopy & Mammogram refused ICD-10-CM: Z53.20  ICD-9-CM: V64.2  10/10/2016 - Present        Mixed dyslipidemia ICD-10-CM: E78.2  ICD-9-CM: 272.2  Unknown - Present    Overview Signed 11/26/2010  9:18 AM by Praveen Lopez LPN     Low HDL             RESOLVED: Thyroid function study abnormality ICD-10-CM: R94.6  ICD-9-CM: 794.5  11/5/2018 - 11/7/2018        RESOLVED: Colonoscopy & Mammogram refused ICD-10-CM: Z53.20  ICD-9-CM: V64.2  10/10/2016 - 11/7/2018        RESOLVED: High triglycerides ICD-10-CM: E78.1  ICD-9-CM: 272.1  Unknown - 11/29/2010              DISCHARGE MEDICATIONS:  Current Discharge Medication List      START taking these medications    Details   levoFLOXacin (Levaquin) 750 mg tablet Take 1 Tablet by mouth daily for 3 days. Qty: 3 Tablet, Refills: 0  Start date: 10/16/2021, End date: 10/19/2021         CONTINUE these medications which have NOT CHANGED    Details   amLODIPine (NORVASC) 5 mg tablet TAKE 1 TABLET BY MOUTH DAILY  Qty: 90 Tablet, Refills: 0    Associated Diagnoses: Benign essential hypertension      valsartan-hydroCHLOROthiazide (DIOVAN-HCT) 320-12.5 mg per tablet Take 1 Tablet by mouth daily. Qty: 90 Tablet, Refills: 0    Comments: **Patient requests 90 days supply**  Associated Diagnoses: Benign essential hypertension      tiZANidine (ZANAFLEX) 2 mg capsule Take 2 mg by mouth three (3) times daily. triamcinolone acetonide (KENALOG) 0.1 % ointment Apply  to affected area two (2) times a day. use thin layer      biotin 1,000 mcg chew Take  by mouth daily. loratadine (CLARITIN) 10 mg tablet Take 10 mg by mouth. DOCOSAHEXANOIC ACID/EPA (FISH OIL PO) Take 1,000 mg by mouth daily.            Greater than 30 minutes were spent with the patient on counseling and coordination of care    Signed:   John Lora MD  10/16/2021  9:53 AM

## 2021-10-16 NOTE — PROGRESS NOTES
Discharging home today. CM received call from Utilization reviewer Glenna Briggs at [phone] that patient has been downgraded to Observation. CM printed copy and when to unit. Patient was already gone. Unable to have signed.   Geo Montejo RN CRM  Ext [phone]

## 2021-10-16 NOTE — ROUTINE PROCESS
TRANSFER - OUT REPORT:    Verbal report given to Alanna Lewis (name) on Nya Espitia  being transferred to [facility] (unit) for routine progression of care       Report consisted of patients Situation, Background, Assessment and   Recommendations(SBAR). Information from the following report(s) SBAR, Kardex, ED Summary, Intake/Output, MAR and Recent Results was reviewed with the receiving nurse. Lines:   Peripheral IV 10/15/21 Right Arm (Active)   Site Assessment Clean, dry, & intact 10/15/21 1452   Phlebitis Assessment 0 10/15/21 1452   Infiltration Assessment 0 10/15/21 1452   Dressing Status Clean, dry, & intact 10/15/21 1452   Dressing Type Transparent 10/15/21 1452   Hub Color/Line Status Blue 10/15/21 1452   Action Taken Blood drawn 10/15/21 1452   Alcohol Cap Used No 10/15/21 1452       Peripheral IV 10/15/21 Left Arm (Active)   Site Assessment Clean, dry, & intact 10/15/21 1453   Phlebitis Assessment 0 10/15/21 1453   Infiltration Assessment 0 10/15/21 1453   Dressing Status Clean, dry, & intact 10/15/21 1453   Dressing Type Transparent 10/15/21 1453   Hub Color/Line Status Blue 10/15/21 1453   Action Taken Blood drawn 10/15/21 1453   Alcohol Cap Used No 10/15/21 1453        Opportunity for questions and clarification was provided.       Patient transported with:   [employer]

## 2021-10-18 ENCOUNTER — PATIENT OUTREACH (OUTPATIENT)
Dept: CASE MANAGEMENT | Age: 76
End: 2021-10-18

## 2021-10-18 NOTE — PROGRESS NOTES
Care Transitions Initial Call    Call within 2 business days of discharge: Yes     Patient: Tana Aragon Patient : 1945 MRN: [id number]    Last Discharge [address]       Complaint Diagnosis Description Type Department Provider    10/15/21 Allergic Reaction Sepsis with acute organ dysfunction without septic shock, due to unspecified organism, unspecified type [facility]) ED to Hosp-Admission (Discharged) (ADMIT) Marilee Galarza MD; Leonard Ruano. .. Was this an external facility discharge? Yes, [facility] 10/15-10/16     Challenges to be reviewed by the provider   Additional needs identified to be addressed with provider: yes  [facility] 10/15-10/16 Allergic reaction to Macrobid. No ACP on file. Needs NICOLE visit. Recheck K+ (3.2 10/15) f/u UA. Method of communication with provider : chart routing    Discussed [phone] related testing which was not done at this time. Advance Care Planning:   Does patient have an Advance Directive:  currently not on file; patient declined education    Inpatient Readmission Risk score: Unplanned Readmit Risk Score: 6    Was this a readmission? no   Patient stated reason for the admission: facial flushing, vomiting, diarrhea and nausea    Patients top risk factors for readmission: medical condition-Medication allergic reaction (Macrobid), Sepsis; h/o- HTN,cholesterol, Prediabetes   Interventions to address risk factors: Scheduled appointment with PCP-Patient has LM for pcp needing NICOLE visit and labs checked and Obtained and reviewed discharge summary and/or continuity of care documents    Care Transition Nurse (CTN) contacted the patient by telephone to perform post hospital discharge assessment. Verified name and  with patient as identifiers. Provided introduction to self, and explanation of the CTN role. Reports she is doing well. Has one more tab of the Levaquin to take. Has been up and about.  No further flushing, no edema in face, no fever, just slow she says. Scheduled 11/10 for Prolapse surgery. CTN reviewed discharge instructions, medical action plan and red flags with patient who verbalized understanding. Were discharge instructions available to patient? yes. Reviewed appropriate site of care based on symptoms and resources available to patient including: PCP, Urgent Care Clinics, When to call 911 and [address]. Patient given an opportunity to ask questions and does not have any further questions or concerns at this time. The patient agrees to contact the PCP office for questions related to their healthcare. Medication reconciliation was performed with patient, who verbalizes understanding of administration of home medications. Advised obtaining a 90-day supply of all daily and as-needed medications. Referral to Pharm D needed: no     Home Health/Outpatient orders at discharge: [address]: na  Date of initial visit: na    Durable Medical Equipment ordered at discharge: none  [facility] [id number] received: na    Covid Risk Education    Educated patient about risk for severe COVID-19 due to risk factors according to CDC guidelines. CTN reviewed discharge instructions, medical action plan and red flag symptoms with the patient who verbalized understanding. Discussed COVID vaccination status: yes. Education provided on COVID-19 vaccination as appropriate. Discussed exposure protocols and quarantine with CDC Guidelines. Patient was given an opportunity to verbalize any questions and concerns and agrees to contact CTN or health care provider for questions related to their healthcare. Was patient discharged with a pulse oximeter? No.    Discussed follow-up appointments. If no appointment was previously scheduled, appointment scheduling offered: yes. Is follow up appointment scheduled within 7 days of discharge? pending. [address] follow up appointment(s): No future appointments.  Has LM for pcp,  re need for NICOLE visit and labs. Non-[facility] follow up appointment(s): na    Plan for follow-up call in 7-10 days based on severity of symptoms and risk factors. Plan for next call: symptom management-assess current symptoms, self management-following discharge instructions, follow up appointment-saw pcp for NICOLE visit and medication management-taking meds as ordered. CTN provided contact information for future needs. Goals Addressed                 This Visit's Progress     Understands red flags post discharge. 10/18/21 [facility] 10/15-10/16 Allergic reaction to [address] discharge instructions with patient   Reviewed meds- education provided on new med.  Reviewed red flags: fever,nausea,vomiting,diarrhea,sob, trouble breathing,rash, chest discomfort, trouble swallowing.  NICOLE with pcp- patient has LM and will schedule.  Given CTN contact info if any questions/concerns.    CTN to check back in about a week, sooner [email or web address]

## 2021-10-20 ENCOUNTER — PATIENT MESSAGE (OUTPATIENT)
Dept: FAMILY MEDICINE CLINIC | Age: 76
End: 2021-10-20

## 2021-10-20 DIAGNOSIS — L30.4 INTERTRIGINOUS DERMATITIS ASSOCIATED WITH MOISTURE: Primary | ICD-10-CM

## 2021-10-20 LAB
BACTERIA SPEC CULT: NORMAL
SERVICE CMNT-IMP: NORMAL

## 2021-10-20 NOTE — TELEPHONE ENCOUNTER
MJ - Thank you so much for getting our wellness appointments straight. I need to get the Kenalog refilled. It works on heat rash under my boobs. I put it on the irritations from the tape(heart monitor) and it is clearing that up.   Thank you

## 2021-10-21 PROBLEM — L30.4 INTERTRIGINOUS DERMATITIS ASSOCIATED WITH MOISTURE: Status: ACTIVE | Noted: 2021-10-21

## 2021-10-21 RX ORDER — TRIAMCINOLONE ACETONIDE 1 MG/G
OINTMENT TOPICAL 2 TIMES DAILY
Qty: 30 G | Refills: 1 | Status: SHIPPED | OUTPATIENT
Start: 2021-10-21

## 2021-10-26 ENCOUNTER — PATIENT OUTREACH (OUTPATIENT)
Dept: CASE MANAGEMENT | Age: 76
End: 2021-10-26

## 2021-10-27 NOTE — PROGRESS NOTES
Called and spoke to patient. Goals      Understands red flags post discharge. 10/18/21 [facility] 10/15-10/16 Allergic reaction to [address] discharge instructions with patient   Reviewed meds- education provided on new med.  Reviewed red flags: fever,nausea,vomiting,diarrhea,sob, trouble breathing,rash, chest discomfort, trouble swallowing.  NICOLE with pcp- patient has LM and will schedule.  Given CTN contact info if any questions/concerns.  CTN to check back in about a week, sooner prn.mbt  10/27/21  Reports she is doing fine. Still trying to get in touch with ,surgeon, to see if needs cxr and labs after being in hospital. Pre op scheduled for 11/3 and surgery 11/10. Otherwise doing great, no reactions to anything. Had her annual flu shot yesterday. No problem with that. Reminded to call if any concerns. mbt

## 2021-11-04 ENCOUNTER — PATIENT OUTREACH (OUTPATIENT)
Dept: CASE MANAGEMENT | Age: 76
End: 2021-11-04

## 2021-11-04 NOTE — PROGRESS NOTES
Called and spoke to patient. Goals      Understands red flags post discharge. 10/18/21 [facility] 10/15-10/16 Allergic reaction to [address] discharge instructions with patient   Reviewed meds- education provided on new med.  Reviewed red flags: fever,nausea,vomiting,diarrhea,sob, trouble breathing,rash, chest discomfort, trouble swallowing.  NICOLE with pcp- patient has LM and will schedule.  Given CTN contact info if any questions/concerns.  CTN to check back in about a week, sooner prn.mbt  10/27/21  Reports she is doing fine. Still trying to get in touch with ,surgeon, to see if needs cxr and labs after being in hospital. Pre op scheduled for 11/3 and surgery 11/10. Otherwise doing great, no reactions to anything. Had her annual flu shot yesterday. No problem with that. Reminded to call if any concerns. mbt  11/4/21  Reports she is doing well. Had pre op yesterday for her surgery scheduled 11/10 for prolapsed uterus. Labs and ua were done. No red flags noted. Wished her well with surgery. Will check back in about a week. mbt

## 2021-11-10 LAB — SARS-COV-2: NEGATIVE

## 2021-11-12 ENCOUNTER — PATIENT OUTREACH (OUTPATIENT)
Dept: CASE MANAGEMENT | Age: 76
End: 2021-11-12

## 2021-11-29 ENCOUNTER — OFFICE VISIT (OUTPATIENT)
Dept: FAMILY MEDICINE CLINIC | Age: 76
End: 2021-11-29
Payer: MEDICARE

## 2021-11-29 VITALS
OXYGEN SATURATION: 99 % | HEIGHT: 62 IN | TEMPERATURE: 97.7 F | WEIGHT: 140.2 LBS | SYSTOLIC BLOOD PRESSURE: 136 MMHG | DIASTOLIC BLOOD PRESSURE: 80 MMHG | BODY MASS INDEX: 25.8 KG/M2 | RESPIRATION RATE: 16 BRPM | HEART RATE: 71 BPM

## 2021-11-29 DIAGNOSIS — Z00.00 MEDICARE ANNUAL WELLNESS VISIT, SUBSEQUENT: Primary | ICD-10-CM

## 2021-11-29 DIAGNOSIS — Z12.11 SCREEN FOR COLON CANCER: ICD-10-CM

## 2021-11-29 PROCEDURE — G8419 CALC BMI OUT NRM PARAM NOF/U: HCPCS | Performed by: FAMILY MEDICINE

## 2021-11-29 PROCEDURE — G8399 PT W/DXA RESULTS DOCUMENT: HCPCS | Performed by: FAMILY MEDICINE

## 2021-11-29 PROCEDURE — G0439 PPPS, SUBSEQ VISIT: HCPCS | Performed by: FAMILY MEDICINE

## 2021-11-29 PROCEDURE — G8536 NO DOC ELDER MAL SCRN: HCPCS | Performed by: FAMILY MEDICINE

## 2021-11-29 PROCEDURE — G8752 SYS BP LESS 140: HCPCS | Performed by: FAMILY MEDICINE

## 2021-11-29 PROCEDURE — G8510 SCR DEP NEG, NO PLAN REQD: HCPCS | Performed by: FAMILY MEDICINE

## 2021-11-29 PROCEDURE — 1101F PT FALLS ASSESS-DOCD LE1/YR: CPT | Performed by: FAMILY MEDICINE

## 2021-11-29 PROCEDURE — 3017F COLORECTAL CA SCREEN DOC REV: CPT | Performed by: FAMILY MEDICINE

## 2021-11-29 PROCEDURE — G8754 DIAS BP LESS 90: HCPCS | Performed by: FAMILY MEDICINE

## 2021-11-29 PROCEDURE — G8427 DOCREV CUR MEDS BY ELIG CLIN: HCPCS | Performed by: FAMILY MEDICINE

## 2021-11-29 NOTE — PROGRESS NOTES
This is the Subsequent Medicare Annual Wellness Exam, performed 12 months or more after the Initial AWV or the last Subsequent AWV    I have reviewed the patient's medical history in detail and updated the computerized patient record. Assessment/Plan   Education and counseling provided:  Are appropriate based on today's review and evaluation  Screening Mammography  Colorectal cancer screening tests    1. Medicare annual wellness visit, subsequent  2. Screen for colon cancer  -     COLOGUARD TEST (FECAL DNA COLORECTAL CANCER SCREENING)       Depression Risk Factor Screening     3 most recent PHQ Screens 11/29/2021   PHQ Not Done -   Little interest or pleasure in doing things Not at all   Feeling down, depressed, irritable, or hopeless Several days   Total Score PHQ 2 1       Alcohol Risk Screen    Do you average more than 1 drink per night or more than 7 drinks a week:  No    On any one occasion in the past three months have you have had more than 3 drinks containing alcohol:  No        Functional Ability and Level of Safety    Hearing: Hearing is good. Activities of Daily Living: The home contains: no safety equipment.   Patient does total self care  ADL Assessment 11/29/2021   Feeding yourself No Help Needed   Getting from bed to chair No Help Needed   Getting dressed No Help Needed   Bathing or showering No Help Needed   Walk across the room (includes cane/walker) No Help Needed   Using the telphone No Help Needed   Taking your medications No Help Needed   Preparing meals No Help Needed   Managing money (expenses/bills) No Help Needed   Moderately strenuous housework (laundry) No Help Needed   Shopping for personal items (toiletries/medicines) No Help Needed   Shopping for groceries No Help Needed   Driving No Help Needed   Climbing a flight of stairs No Help Needed   Getting to places beyond walking distances No Help Needed     Diet: regular  Exercise: not now while recuperating from bladder surgery 11-10-21, plans to restart; was doing stationary bike 3-4 x a week x 10 minutes and walking in neighborhood w/   Caffeine: 2 cups of coffee a day, several glasses of green tea a day  Weight: has gained some weight since Covid pandemic 3-2020  Home BP's: run 120's-140/80-84  Visit Vitals  /80 (BP 1 Location: Right upper arm)   Pulse 71   Temp 97.7 °F (36.5 °C) (Oral)   Resp 16   Ht 5' 2\" (1.575 m)   Wt 140 lb 3.2 oz (63.6 kg)   SpO2 99%   BMI 25.64 kg/m²        Ambulation: with no difficulty     Fall Risk:  Fall Risk Assessment, last 12 mths 11/29/2021   Able to walk? Yes   Fall in past 12 months? 0   Do you feel unsteady?  0   Are you worried about falling 0      Abuse Screen:  Patient is not abused       Cognitive Screening    Has your family/caregiver stated any concerns about your memory: no         Health Maintenance Due     Health Maintenance Due   Topic Date Due    Low dose CT lung screening  Never done    Colorectal Cancer Screening Combo  11/05/2015    COVID-19 Vaccine (3 - Booster for [employer] series) 10/01/2021       Patient Care Team   Patient Care Team:  Rosi Duffy MD as PCP - General (Family Medicine)  Rosi Duffy MD as PCP - [facility] Empaneled Provider  Dina Rojas MD (Otolaryngology)  Keyana Guido MD (Ophthalmology)    History     Patient Active Problem List   Diagnosis Code    Mixed dyslipidemia E78.2    Benign essential hypertension I10    Needle phobia F40.298    Female genital prolapse N81.9    Impaired glucose tolerance R73.02    BMI 26.0-26.9,adult Z68.26    Osteopenia of multiple sites M85.89    Colonoscopy & Mammogram refused Z53.20    Prediabetes R73.03    Sepsis ([address] A41.9    Allergic reaction T78.40XA    Intertriginous dermatitis associated with moisture L30.4     Past Medical History:   Diagnosis Date    Benign essential hypertension 10/10/2016    Colonoscopy & Mammogram refused 10/10/2016    Dyslipidemia (high LDL; low HDL)     Full dentures     History of tobacco use     Intertriginous dermatitis associated with moisture 10/21/2021    Submammary     Mixed dyslipidemia     Low HDL     Prediabetes 2021      Past Surgical History:   Procedure Laterality Date    HX GYN      D&C about     HX SALPINGO-OOPHORECTOMY Bilateral 11/10/2021    Urologist, Dr. Perla Marino    [facility]  2020    Prolapse    HX TYMPANOSTOMY Right ~    Dr Mila Valverde HX UROLOGICAL  2020    Prolapse, Dr. Rachel Carbajal  11/10/2021    Bladder Sling, Dr. Perla Marino     Current Outpatient Medications   Medication Sig Dispense Refill    cholecalciferol, vitamin D3, (VITAMIN D3 PO) Take  by mouth daily.  triamcinolone acetonide (KENALOG) 0.1 % ointment Apply  to affected area two (2) times a day. Use thin layer to affected areas as needed for skin irritation 30 g 1    amLODIPine (NORVASC) 5 mg tablet TAKE 1 TABLET BY MOUTH DAILY 90 Tablet 0    valsartan-hydroCHLOROthiazide (DIOVAN-HCT) 320-12.5 mg per tablet Take 1 Tablet by mouth daily. 90 Tablet 0    biotin 1,000 mcg chew Take  by mouth daily.  loratadine (CLARITIN) 10 mg tablet Take 10 mg by mouth.  DOCOSAHEXANOIC ACID/EPA (FISH OIL PO) Take 1,000 mg by mouth daily.  potassium chloride (K-DUR, KLOR-CON) 20 mEq tablet Take 1 Tablet by mouth daily. 7 Tablet 0    tiZANidine (ZANAFLEX) 2 mg capsule Take 2 mg by mouth three (3) times daily.  (Patient not taking: Reported on 2021)       Allergies   Allergen Reactions    Macrobid [Nitrofurantoin Monohyd/M-Cryst] Anaphylaxis and Itching     10/15/21: [facility] [place] [facility] for facial redness, flushing, swelling, itching, nausea, vomtingin    Ciprofloxacin Nausea Only    Pen-Vee K Rash     Keflex OK       Family History   Problem Relation Age of Onset    Breast Cancer Mother          age 46    Heart Disease Father     Hypertension Father      Social History     Tobacco Use    Smoking status: Former Smoker     Packs/day: 1.00     Years: 25.00     Pack years: 25.00     Quit date: 2008     Years since quittin.0    Smokeless tobacco: Never Used   Substance Use Topics    Alcohol use: No       Sidra Thornton MD

## 2021-11-29 NOTE — PATIENT INSTRUCTIONS
Medicare Wellness Visit, Female     The best way to live healthy is to have a lifestyle where you eat a well-balanced diet, exercise regularly, limit alcohol use, and quit all forms of tobacco/nicotine, if applicable. Regular preventive services are another way to keep healthy. Preventive services (vaccines, screening tests, monitoring & exams) can help personalize your care plan, which helps you manage your own care. Screening tests can find health problems at the earliest stages, when they are easiest to treat. [facility] follows the current, evidence-based guidelines published by the [facility] [employer] ([facility]) when recommending preventive services for our patients. Because we follow these guidelines, sometimes recommendations change over time as research supports it. (For example, mammograms used to be recommended annually. Even though Medicare will still pay for an annual mammogram, the newer guidelines recommend a mammogram every two years for women of average risk). Of course, you and your doctor may decide to screen more often for some diseases, based on your risk and your co-morbidities (chronic disease you are already diagnosed with). Preventive services for you include:  - Medicare offers their members a free annual wellness visit, which is time for you and your primary care provider to discuss and plan for your preventive service needs. Take advantage of this benefit every year!  -All adults over the age of 72 should receive the recommended pneumonia vaccines. Current USPSTF guidelines recommend a series of two vaccines for the best pneumonia protection.   -All adults should have a flu vaccine yearly and a tetanus vaccine every 10 years.   -All adults age 48 and older should receive the shingles vaccines (series of two vaccines).       -All adults age 38-68 who are overweight should have a diabetes screening test once every three years.   -All adults born between 80 and 1965 should be screened once for Hepatitis C.  -Other screening tests and preventive services for persons with diabetes include: an eye exam to screen for diabetic retinopathy, a kidney function test, a foot exam, and stricter control over your cholesterol.   -Cardiovascular screening for adults with routine risk involves an electrocardiogram (ECG) at intervals determined by your doctor.   -Colorectal cancer screenings should be done for adults age 54-65 with no increased risk factors for colorectal cancer. There are a number of acceptable methods of screening for this type of cancer. Each test has its own benefits and drawbacks. Discuss with your doctor what is most appropriate for you during your annual wellness visit. The different tests include: colonoscopy (considered the best screening method), a fecal occult blood test, a fecal DNA test, and sigmoidoscopy.    -A bone mass density test is recommended when a woman turns 65 to screen for osteoporosis. This test is only recommended one time, as a screening. Some providers will use this same test as a disease monitoring tool if you already have osteoporosis. -Breast cancer screenings are recommended every other year for women of normal risk, age 54-69.  -Cervical cancer screenings for women over age 72 are only recommended with certain risk factors. Here is a list of your current Health Maintenance items (your personalized list of preventive services) with a due date:  Health Maintenance Due   Topic Date Due    Smoker or Former Smoker - [place] 79  Never done    Colorectal Screening  11/05/2015    COVID-19 Vaccine (3 - Booster for [employer] series) 10/01/2021         Medicare Wellness Visit, Female     The best way to live healthy is to have a lifestyle where you eat a well-balanced diet, exercise regularly, limit alcohol use, and quit all forms of tobacco/nicotine, if applicable.      Regular preventive services are another way to keep healthy. Preventive services (vaccines, screening tests, monitoring & exams) can help personalize your care plan, which helps you manage your own care. Screening tests can find health problems at the earliest stages, when they are easiest to treat. [facility] follows the current, evidence-based guidelines published by the [facility] [employer] ([facility]) when recommending preventive services for our patients. Because we follow these guidelines, sometimes recommendations change over time as research supports it. (For example, mammograms used to be recommended annually. Even though Medicare will still pay for an annual mammogram, the newer guidelines recommend a mammogram every two years for women of average risk). Of course, you and your doctor may decide to screen more often for some diseases, based on your risk and your co-morbidities (chronic disease you are already diagnosed with). Preventive services for you include:  - Medicare offers their members a free annual wellness visit, which is time for you and your primary care provider to discuss and plan for your preventive service needs. Take advantage of this benefit every year!  -All adults over the age of 72 should receive the recommended pneumonia vaccines. Current USPSTF guidelines recommend a series of two vaccines for the best pneumonia protection.   -All adults should have a flu vaccine yearly and a tetanus vaccine every 10 years.   -All adults age 48 and older should receive the shingles vaccines (series of two vaccines).       -All adults age 38-68 who are overweight should have a diabetes screening test once every three years.   -All adults born between 80 and 1965 should be screened once for Hepatitis C.  -Other screening tests and preventive services for persons with diabetes include: an eye exam to screen for diabetic retinopathy, a kidney function test, a foot exam, and stricter control over your cholesterol.   -Cardiovascular screening for adults with routine risk involves an electrocardiogram (ECG) at intervals determined by your doctor.   -Colorectal cancer screenings should be done for adults age 54-65 with no increased risk factors for colorectal cancer. There are a number of acceptable methods of screening for this type of cancer. Each test has its own benefits and drawbacks. Discuss with your doctor what is most appropriate for you during your annual wellness visit. The different tests include: colonoscopy (considered the best screening method), a fecal occult blood test, a fecal DNA test, and sigmoidoscopy.    -A bone mass density test is recommended when a woman turns 65 to screen for osteoporosis. This test is only recommended one time, as a screening. Some providers will use this same test as a disease monitoring tool if you already have osteoporosis. -Breast cancer screenings are recommended every other year for women of normal risk, age 54-69.  -Cervical cancer screenings for women over age 72 are only recommended with certain risk factors.      Here is a list of your current Health Maintenance items (your personalized list of preventive services) with a due date:  Health Maintenance Due   Topic Date Due    Smoker or Former Smoker - [place] 79  Never done    Colorectal Screening  11/05/2015    COVID-19 Vaccine (3 - Booster for [employer] [place] series) 10/01/2021

## 2021-11-29 NOTE — PROGRESS NOTES
Chief Complaint   Patient presents with   Shirley Liao Annual Wellness Visit     1. \"Have you been to the ER, urgent care clinic since your last visit? Hospitalized since your last visit? \" Yes Where: [place] 11/10 for surgery, [facility] ER    2. \"Have you seen or consulted any other health care providers outside of the [address] [place] [address] since your last visit? \" Yes Where: urology Dr Blanca Streeter     3. For patients over 45: Has the patient had a colonoscopy? No     If the patient is female:    4. For patients over 40: Has the patient had a mammogram? No    5. For patients over 21: Has the patient had a pap smear?  NA

## 2021-12-14 DIAGNOSIS — I10 BENIGN ESSENTIAL HYPERTENSION: ICD-10-CM

## 2021-12-14 RX ORDER — AMLODIPINE BESYLATE 5 MG/1
TABLET ORAL
Qty: 90 TABLET | OUTPATIENT
Start: 2021-12-14

## 2021-12-17 ENCOUNTER — APPOINTMENT (OUTPATIENT)
Dept: FAMILY MEDICINE CLINIC | Age: 76
End: 2021-12-17

## 2021-12-17 ENCOUNTER — TELEPHONE (OUTPATIENT)
Dept: FAMILY MEDICINE CLINIC | Age: 76
End: 2021-12-17

## 2021-12-17 DIAGNOSIS — D64.9 ACUTE ANEMIA: ICD-10-CM

## 2021-12-17 DIAGNOSIS — R19.7 DIARRHEA, UNSPECIFIED TYPE: ICD-10-CM

## 2021-12-17 DIAGNOSIS — E87.6 HYPOKALEMIA: ICD-10-CM

## 2021-12-17 LAB
ANION GAP SERPL CALC-SCNC: 6 MMOL/L (ref 5–15)
BASOPHILS # BLD: 0.1 K/UL (ref 0–0.1)
BASOPHILS NFR BLD: 1 % (ref 0–1)
BUN SERPL-MCNC: 14 MG/DL (ref 6–20)
BUN/CREAT SERPL: 18 (ref 12–20)
CALCIUM SERPL-MCNC: 9.7 MG/DL (ref 8.5–10.1)
CHLORIDE SERPL-SCNC: 104 MMOL/L (ref 97–108)
CO2 SERPL-SCNC: 26 MMOL/L (ref 21–32)
CREAT SERPL-MCNC: 0.77 MG/DL (ref 0.55–1.02)
DIFFERENTIAL METHOD BLD: NORMAL
EOSINOPHIL # BLD: 0.2 K/UL (ref 0–0.4)
EOSINOPHIL NFR BLD: 4 % (ref 0–7)
ERYTHROCYTE [DISTWIDTH] IN BLOOD BY AUTOMATED COUNT: 13.1 % (ref 11.5–14.5)
GLUCOSE SERPL-MCNC: 97 MG/DL (ref 65–100)
HCT VFR BLD AUTO: 36.8 % (ref 35–47)
HGB BLD-MCNC: 12.1 G/DL (ref 11.5–16)
IMM GRANULOCYTES # BLD AUTO: 0 K/UL (ref 0–0.04)
IMM GRANULOCYTES NFR BLD AUTO: 0 % (ref 0–0.5)
LYMPHOCYTES # BLD: 1.2 K/UL (ref 0.8–3.5)
LYMPHOCYTES NFR BLD: 24 % (ref 12–49)
MCH RBC QN AUTO: 29.3 PG (ref 26–34)
MCHC RBC AUTO-ENTMCNC: 32.9 G/DL (ref 30–36.5)
MCV RBC AUTO: 89.1 FL (ref 80–99)
MONOCYTES # BLD: 0.4 K/UL (ref 0–1)
MONOCYTES NFR BLD: 8 % (ref 5–13)
NEUTS SEG # BLD: 3.1 K/UL (ref 1.8–8)
NEUTS SEG NFR BLD: 63 % (ref 32–75)
NRBC # BLD: 0 K/UL (ref 0–0.01)
NRBC BLD-RTO: 0 PER 100 WBC
PLATELET # BLD AUTO: 254 K/UL (ref 150–400)
PMV BLD AUTO: 10.5 FL (ref 8.9–12.9)
POTASSIUM SERPL-SCNC: 4.2 MMOL/L (ref 3.5–5.1)
RBC # BLD AUTO: 4.13 M/UL (ref 3.8–5.2)
SODIUM SERPL-SCNC: 136 MMOL/L (ref 136–145)
TSH SERPL DL<=0.05 MIU/L-ACNC: 1.74 UIU/ML (ref 0.36–3.74)
WBC # BLD AUTO: 5 K/UL (ref 3.6–11)

## 2021-12-17 NOTE — TELEPHONE ENCOUNTER
Called pt tried to explain why she needed to have the labs repeated. that last labs were abnormal that Dr Virginia Siddiqui wouldn't be doing her job if she didn't follow thru with to  Make sure labs are back to normal.  That Dr Virginia Siddiqui can't be prescribing medication if they are causing a problem. Pt is very upset that she has to come in to get them drawn. She doesn't feel that she needs to have labs done a couple of times a year. Explained last labs were abnormal and that is why they need to be rechecked. Pt states that she doesn't have a choice and that she will come in. I scheduled her for an appt today @ 1:00. Pt states that she has enough of her medication to take on Monday.      ----- Message from Solo Yeh MD sent at 12/16/2021  6:45 PM EST -----  Regarding: FW: Dr. Flip Banks  I have 3 lab orders still pended in the system for her from shortly after she was in the ER in October. I had given her instructions back then about needing follow up labs. Even though she refused to take the K+ that doesn't mean I should ignore the fact that it was abnormal. In fact that is even MORE reason to keep close watch on it to make sure she is getting enough through her foods. She was also anemic and had an abnormal calcium. I am only following up on all this for her health not for my benefit. And I didn't say I needed to see HER as her message suggests, she would only need to have the follow up labs done. She never came back for those like we told her to in the beginning and now she is upset w/ us for giving her repeated reminders from something we told her to do 2 months ago. First she wouldn't take the K+ as advised, second she never had the follow up labs done. It is my judgement call to determine what is safe and isnt for her health. I cannot continue to prescribe something without knowing if it will cause more harm than good.  I wouldn't be doing my job and being an advocate for her health if I did things the way she wanted to and not what I feel is best for her health. (She demanded that I take her back on as a new patient and now she doesn't want to follow my recommendations).   ----- Message -----  From: Israel Arias LPN  Sent: 79/66/2024   1:19 PM EST  To: Logan Barlow MD  Subject: FW: Dr. Heavenly Barbosa                           I know that back on 10/18-19 you had sent over some potassium to start taking and pt said that she wasn't going to take it. At that time you wanted her to come for more lab work but she never started the [employer] Do you need her to still get labs?  ----- Message -----  From: Na Timmons  Sent: 12/15/2021  12:03 PM EST  To: [facility] Nurse [place]  Subject: Dr. Heavenly Gee had labs done July 2021 , Blood work done 10/14/21  at [address] [place] [address] had blood work  11/7/21 pre - op  Blood work in hospital 11/10/21. You told me on  wellness visit 11/29/21 everything looks good ,  unless you need me I'll see you at your next wellness appt. I don't understand the mixed messaging?   Thank You  Ruba Lama 1945

## 2021-12-17 NOTE — TELEPHONE ENCOUNTER
Thanks for taking the time to explain that. The orders are still pended in the system. The Norvasc was sent in 12/13/21 for 30 day supply. So I am assuming she is referring to the [facility] that she will be out of by Monday. If she is having labs done today, they should be resulted to me over the weekend and I can send in the medication refill after I review those. I will message her via [employer] over the weekend about her results.

## 2021-12-18 DIAGNOSIS — I10 BENIGN ESSENTIAL HYPERTENSION: ICD-10-CM

## 2021-12-18 RX ORDER — VALSARTAN AND HYDROCHLOROTHIAZIDE 320; 12.5 MG/1; MG/1
1 TABLET, FILM COATED ORAL DAILY
Qty: 90 TABLET | Refills: 3 | Status: SHIPPED | OUTPATIENT
Start: 2021-12-18

## 2021-12-18 RX ORDER — AMLODIPINE BESYLATE 5 MG/1
5 TABLET ORAL DAILY
Qty: 90 TABLET | Refills: 3 | Status: SHIPPED | OUTPATIENT
Start: 2021-12-18

## 2022-01-01 RX ORDER — TIZANIDINE HYDROCHLORIDE 2 MG/1
2 CAPSULE, GELATIN COATED ORAL
Qty: 30 CAPSULE | Refills: 1 | Status: SHIPPED | OUTPATIENT
Start: 2022-01-01 | End: 2022-10-13 | Stop reason: ALTCHOICE

## 2022-03-19 PROBLEM — F40.298 NEEDLE PHOBIA: Status: ACTIVE | Noted: 2017-07-19

## 2022-03-19 PROBLEM — T78.40XA ALLERGIC REACTION: Status: ACTIVE | Noted: 2021-10-16

## 2022-03-19 PROBLEM — L30.4 INTERTRIGINOUS DERMATITIS ASSOCIATED WITH MOISTURE: Status: ACTIVE | Noted: 2021-10-21

## 2022-03-19 PROBLEM — A41.9 SEPSIS (HCC): Status: ACTIVE | Noted: 2021-10-15

## 2022-03-20 PROBLEM — M85.89 OSTEOPENIA OF MULTIPLE SITES: Status: ACTIVE | Noted: 2019-12-23

## 2022-03-20 PROBLEM — R73.03 PREDIABETES: Status: ACTIVE | Noted: 2021-06-21

## 2022-03-20 PROBLEM — N81.9 FEMALE GENITAL PROLAPSE: Status: ACTIVE | Noted: 2017-07-19

## 2022-03-20 PROBLEM — R73.02 IMPAIRED GLUCOSE TOLERANCE: Status: ACTIVE | Noted: 2017-10-09

## 2022-10-13 ENCOUNTER — TELEPHONE (OUTPATIENT)
Dept: FAMILY MEDICINE CLINIC | Age: 77
End: 2022-10-13

## 2022-10-13 PROBLEM — R94.6 THYROID FUNCTION STUDY ABNORMALITY: Status: ACTIVE | Noted: 2018-11-05

## 2022-10-13 RX ORDER — CYCLOBENZAPRINE HCL 5 MG
5 TABLET ORAL
Qty: 15 TABLET | Refills: 0 | Status: SHIPPED | OUTPATIENT
Start: 2022-10-13

## 2022-10-14 NOTE — TELEPHONE ENCOUNTER
----- Message from Vlad Lopez LPN sent at 38/11/2182  4:34 PM EDT -----  LOV was 11/29/21, scheduled 11/30/22. I didn't know if you wanted to change to something else, have her come in?  ----- Message -----  From: Kaylie Hammond  Sent: 10/13/2022   2:08 PM EDT  To: [facility] Nurse Pool  Subject: Dr Zuleika Prado                                I am having muscle spasms in my back! I usually take Tizanidine 2mg capsules  because I am on Trospium Chloride 20mg  tab twice a day, I cannot take the Tizanidine. Can you prescribe something else for the spasms?   Thank You Jeromy Santana 1945

## 2022-11-30 ENCOUNTER — OFFICE VISIT (OUTPATIENT)
Dept: FAMILY MEDICINE CLINIC | Age: 77
End: 2022-11-30
Payer: MEDICARE

## 2022-11-30 VITALS
TEMPERATURE: 98.2 F | DIASTOLIC BLOOD PRESSURE: 80 MMHG | SYSTOLIC BLOOD PRESSURE: 146 MMHG | OXYGEN SATURATION: 99 % | RESPIRATION RATE: 16 BRPM | HEIGHT: 62 IN | BODY MASS INDEX: 24.69 KG/M2 | WEIGHT: 134.2 LBS | HEART RATE: 69 BPM

## 2022-11-30 DIAGNOSIS — R94.6 BORDERLINE ABNORMAL THYROID FUNCTION TEST: ICD-10-CM

## 2022-11-30 DIAGNOSIS — E78.2 MIXED DYSLIPIDEMIA: ICD-10-CM

## 2022-11-30 DIAGNOSIS — I10 BENIGN ESSENTIAL HYPERTENSION: ICD-10-CM

## 2022-11-30 DIAGNOSIS — Z00.00 MEDICARE ANNUAL WELLNESS VISIT, SUBSEQUENT: Primary | ICD-10-CM

## 2022-11-30 DIAGNOSIS — R73.03 PREDIABETES: ICD-10-CM

## 2022-11-30 LAB
ALBUMIN SERPL-MCNC: 4.3 G/DL (ref 3.5–5)
ALBUMIN/GLOB SERPL: 1.2 {RATIO} (ref 1.1–2.2)
ALP SERPL-CCNC: 91 U/L (ref 45–117)
ALT SERPL-CCNC: 27 U/L (ref 12–78)
ANION GAP SERPL CALC-SCNC: 7 MMOL/L (ref 5–15)
AST SERPL-CCNC: 16 U/L (ref 15–37)
BILIRUB SERPL-MCNC: 0.6 MG/DL (ref 0.2–1)
BUN SERPL-MCNC: 11 MG/DL (ref 6–20)
BUN/CREAT SERPL: 15 (ref 12–20)
CALCIUM SERPL-MCNC: 9.6 MG/DL (ref 8.5–10.1)
CHLORIDE SERPL-SCNC: 103 MMOL/L (ref 97–108)
CHOLEST SERPL-MCNC: 189 MG/DL
CO2 SERPL-SCNC: 28 MMOL/L (ref 21–32)
CREAT SERPL-MCNC: 0.74 MG/DL (ref 0.55–1.02)
ERYTHROCYTE [DISTWIDTH] IN BLOOD BY AUTOMATED COUNT: 12.7 % (ref 11.5–14.5)
EST. AVERAGE GLUCOSE BLD GHB EST-MCNC: 108 MG/DL
GLOBULIN SER CALC-MCNC: 3.5 G/DL (ref 2–4)
GLUCOSE SERPL-MCNC: 110 MG/DL (ref 65–100)
HBA1C MFR BLD: 5.4 % (ref 4–5.6)
HCT VFR BLD AUTO: 40.5 % (ref 35–47)
HDLC SERPL-MCNC: 62 MG/DL
HDLC SERPL: 3 {RATIO} (ref 0–5)
HGB BLD-MCNC: 13.2 G/DL (ref 11.5–16)
LDLC SERPL CALC-MCNC: 104.4 MG/DL (ref 0–100)
MCH RBC QN AUTO: 30.3 PG (ref 26–34)
MCHC RBC AUTO-ENTMCNC: 32.6 G/DL (ref 30–36.5)
MCV RBC AUTO: 92.9 FL (ref 80–99)
NRBC # BLD: 0 K/UL (ref 0–0.01)
NRBC BLD-RTO: 0 PER 100 WBC
PLATELET # BLD AUTO: 237 K/UL (ref 150–400)
PMV BLD AUTO: 10 FL (ref 8.9–12.9)
POTASSIUM SERPL-SCNC: 4.6 MMOL/L (ref 3.5–5.1)
PROT SERPL-MCNC: 7.8 G/DL (ref 6.4–8.2)
RBC # BLD AUTO: 4.36 M/UL (ref 3.8–5.2)
SODIUM SERPL-SCNC: 138 MMOL/L (ref 136–145)
TRIGL SERPL-MCNC: 113 MG/DL (ref ?–150)
TSH SERPL DL<=0.05 MIU/L-ACNC: 2.19 UIU/ML (ref 0.36–3.74)
VLDLC SERPL CALC-MCNC: 22.6 MG/DL
WBC # BLD AUTO: 5.6 K/UL (ref 3.6–11)

## 2022-11-30 PROCEDURE — 1101F PT FALLS ASSESS-DOCD LE1/YR: CPT | Performed by: FAMILY MEDICINE

## 2022-11-30 PROCEDURE — G0439 PPPS, SUBSEQ VISIT: HCPCS | Performed by: FAMILY MEDICINE

## 2022-11-30 PROCEDURE — 3078F DIAST BP <80 MM HG: CPT | Performed by: FAMILY MEDICINE

## 2022-11-30 PROCEDURE — G8754 DIAS BP LESS 90: HCPCS | Performed by: FAMILY MEDICINE

## 2022-11-30 PROCEDURE — 3017F COLORECTAL CA SCREEN DOC REV: CPT | Performed by: FAMILY MEDICINE

## 2022-11-30 PROCEDURE — G8427 DOCREV CUR MEDS BY ELIG CLIN: HCPCS | Performed by: FAMILY MEDICINE

## 2022-11-30 PROCEDURE — G8399 PT W/DXA RESULTS DOCUMENT: HCPCS | Performed by: FAMILY MEDICINE

## 2022-11-30 PROCEDURE — G0463 HOSPITAL OUTPT CLINIC VISIT: HCPCS | Performed by: FAMILY MEDICINE

## 2022-11-30 PROCEDURE — G8420 CALC BMI NORM PARAMETERS: HCPCS | Performed by: FAMILY MEDICINE

## 2022-11-30 PROCEDURE — 3074F SYST BP LT 130 MM HG: CPT | Performed by: FAMILY MEDICINE

## 2022-11-30 PROCEDURE — G8510 SCR DEP NEG, NO PLAN REQD: HCPCS | Performed by: FAMILY MEDICINE

## 2022-11-30 PROCEDURE — 1090F PRES/ABSN URINE INCON ASSESS: CPT | Performed by: FAMILY MEDICINE

## 2022-11-30 PROCEDURE — 99214 OFFICE O/P EST MOD 30 MIN: CPT | Performed by: FAMILY MEDICINE

## 2022-11-30 PROCEDURE — G8753 SYS BP > OR = 140: HCPCS | Performed by: FAMILY MEDICINE

## 2022-11-30 PROCEDURE — G8536 NO DOC ELDER MAL SCRN: HCPCS | Performed by: FAMILY MEDICINE

## 2022-11-30 PROCEDURE — 1123F ACP DISCUSS/DSCN MKR DOCD: CPT | Performed by: FAMILY MEDICINE

## 2022-11-30 RX ORDER — VITAMIN E 1000 UNIT
1000 CAPSULE ORAL
COMMUNITY

## 2022-11-30 RX ORDER — TROSPIUM CHLORIDE 20 MG/1
20 TABLET, FILM COATED ORAL
COMMUNITY

## 2022-11-30 RX ORDER — LANOLIN ALCOHOL/MO/W.PET/CERES
1 CREAM (GRAM) TOPICAL
COMMUNITY

## 2022-11-30 NOTE — PROGRESS NOTES
Chief Complaint   Patient presents with    Annual Wellness Visit    Cholesterol Problem     fasting    Hypertension    Blood sugar problem   1. \"Have you been to the ER, urgent care clinic since your last visit? Hospitalized since your last visit? \" No    2. \"Have you seen or consulted any other health care providers outside of the [address] [place] [address] since your last visit? \" Urologist Dr Louise Jensen, eye Dr Pravin Fry @ [facility]    3. For patients aged 39-70: Has the patient had a colonoscopy / FIT/ Cologuard? No      If the patient is female:    4. For patients aged 41-77: Has the patient had a mammogram within the past 2 years? No      5. For patients aged 21-65: Has the patient had a pap smear?  NA - based on age or sex

## 2022-11-30 NOTE — PATIENT INSTRUCTIONS
Medicare Wellness Visit, Female     The best way to live healthy is to have a lifestyle where you eat a well-balanced diet, exercise regularly, limit alcohol use, and quit all forms of tobacco/nicotine, if applicable. Regular preventive services are another way to keep healthy. Preventive services (vaccines, screening tests, monitoring & exams) can help personalize your care plan, which helps you manage your own care. Screening tests can find health problems at the earliest stages, when they are easiest to treat. [facility] follows the current, evidence-based guidelines published by the [facility] [employer] ([facility]) when recommending preventive services for our patients. Because we follow these guidelines, sometimes recommendations change over time as research supports it. (For example, mammograms used to be recommended annually. Even though Medicare will still pay for an annual mammogram, the newer guidelines recommend a mammogram every two years for women of average risk). Of course, you and your doctor may decide to screen more often for some diseases, based on your risk and your co-morbidities (chronic disease you are already diagnosed with). Preventive services for you include:  - Medicare offers their members a free annual wellness visit, which is time for you and your primary care provider to discuss and plan for your preventive service needs. Take advantage of this benefit every year!  -All adults over the age of 72 should receive the recommended pneumonia vaccines. Current USPSTF guidelines recommend a series of two vaccines for the best pneumonia protection.   -All adults should have a flu vaccine yearly and a tetanus vaccine every 10 years.   -All adults age 48 and older should receive the shingles vaccines (series of two vaccines).       -All adults age 38-68 who are overweight should have a diabetes screening test once every three years.   -All adults born between 80 and 1965 should be screened once for Hepatitis C.  -Other screening tests and preventive services for persons with diabetes include: an eye exam to screen for diabetic retinopathy, a kidney function test, a foot exam, and stricter control over your cholesterol.   -Cardiovascular screening for adults with routine risk involves an electrocardiogram (ECG) at intervals determined by your doctor.   -Colorectal cancer screenings should be done for adults age 54-65 with no increased risk factors for colorectal cancer. There are a number of acceptable methods of screening for this type of cancer. Each test has its own benefits and drawbacks. Discuss with your doctor what is most appropriate for you during your annual wellness visit. The different tests include: colonoscopy (considered the best screening method), a fecal occult blood test, a fecal DNA test, and sigmoidoscopy.    -A bone mass density test is recommended when a woman turns 65 to screen for osteoporosis. This test is only recommended one time, as a screening. Some providers will use this same test as a disease monitoring tool if you already have osteoporosis. -Breast cancer screenings are recommended every other year for women of normal risk, age 54-69.  -Cervical cancer screenings for women over age 72 are only recommended with certain risk factors.      Here is a list of your current Health Maintenance items (your personalized list of preventive services) with a due date:  Health Maintenance Due   Topic Date Due    Smoker or Former [address]  Never done    Colorectal Screening  11/05/2015    Hemoglobin A1C    07/07/2022    Mammogram  11/29/2022

## 2022-11-30 NOTE — PROGRESS NOTES
This is the Subsequent Medicare Annual Wellness Exam, performed 12 months or more after the Initial AWV or the last Subsequent AWV    I have reviewed the patient's medical history in detail and updated the computerized patient record. Assessment/Plan   Education and counseling provided:  Are appropriate based on today's review and evaluation    1. Medicare annual wellness visit, subsequent     Depression Risk Factor Screening     3 most recent PHQ Screens 11/30/2022   PHQ Not Done -   Little interest or pleasure in doing things Not at all   Feeling down, depressed, irritable, or hopeless Not at all   Total Score PHQ 2 0       Alcohol & Drug Abuse Risk Screen   Do you average more than 1 drink per night or more than 7 drinks a week?: (P) No  On any one occasion in the past three months have you had more than 3 drinks containing alcohol?: (P) No          Functional Ability and Level of Safety   Hearing:  Hearing: (P) Patient reports hearing is good    Activities of Daily Living: The home contains: (P) handrails, grab bars  Functional ADLs: (P) Patient does total self care   ADL Assessment 11/30/2022   Feeding yourself No Help Needed   Getting from bed to chair No Help Needed   Getting dressed No Help Needed   Bathing or showering No Help Needed   Walk across the room (includes cane/walker) No Help Needed   Using the telphone No Help Needed   Taking your medications No Help Needed   Preparing meals No Help Needed   Managing money (expenses/bills) No Help Needed   Moderately strenuous housework (laundry) No Help Needed   Shopping for personal items (toiletries/medicines) No Help Needed   Shopping for groceries No Help Needed   Driving No Help Needed   Climbing a flight of stairs No Help Needed   Getting to places beyond walking distances No Help Needed       Ambulation:  Patient ambulates: (P) with no difficulty   Fall Risk:  Fall Risk Assessment, last 12 mths 11/30/2022   Able to walk?  Yes   Fall in past 15 months? 0   Do you feel unsteady?  0   Are you worried about falling 0     Abuse Screen:  Do you ever feel afraid of your partner?: (P) No  Are you in a relationship with someone who physically or mentally threatens you?: (P) No  Is it safe for you to go home?: (P) Yes      Cognitive Screening   Has your family/caregiver stated any concerns about your memory?: (P) No       Health Maintenance Due     Health Maintenance Due   Topic Date Due    Low dose CT lung screening  Never done    Colorectal Cancer Screening Combo  11/05/2015    A1C test (Diabetic or Prediabetic)  07/07/2022    Breast Cancer Screen Mammogram  11/29/2022       Patient Care Team   Patient Care Team:  Isma Mendiola MD as PCP - General (Family Medicine)  Is[facility] Mend[facility] PCP [facility]  Carlos Manuel Bourne MD (Otolaryngology)  Asad Castanon MD (Ophthalmology)    History     Patient Active Problem List   Diagnosis Code    High triglycerides E78.1    Mixed dyslipidemia E78.2    Benign essential hypertension I10    Needle phobia F40.298    Female genital prolapse N81.9    Prediabetes R73.03    Thyroid function study abnormality R94.6    Osteopenia of multiple sites M85.89    Colonoscopy & Mammogram refused Z53.20    Intertriginous dermatitis associated with moisture L30.4    History of tobacco use Z87.891     Past Medical History:   Diagnosis Date    Benign essential hypertension 10/10/2016    Colonoscopy & Mammogram refused 10/10/2016    Dyslipidemia (high LDL; low HDL)     History of tobacco use     Intertriginous dermatitis associated with moisture 10/21/2021    Submammary     Mixed dyslipidemia     Low HDL     Prediabetes 10/9/2017      Past Surgical History:   Procedure Laterality Date    HX GYN      D&C about 1976    HX SALPINGO-OOPHORECTOMY Bilateral 11/10/2021    Urologist, Dr. Bri Desai  07/08/2020    Prolapse    HX TYMPANOSTOMY Right ~2008    Dr Dano Dueñas 2020    Prolapse, Dr. Sellers Counts    HX UROLOGICAL  11/10/2021    Bladder Sling, Dr. Rosa Maria Torres     Current Outpatient Medications   Medication Sig Dispense Refill    trospium (SANCTURA) 20 mg tablet Take 20 mg by mouth Before breakfast and dinner. ZINC PO Take 30 mg by mouth every Monday, Wednesday, Friday. ascorbic acid, vitamin C, (Vitamin C) 1,000 mg tablet Take 1,000 mg by mouth every Monday, Wednesday, Friday. glucosamine-chondroitin (ARTHX) 500-400 mg cap Take 1 Capsule by mouth every Monday, Wednesday, Friday, Saturday. cyclobenzaprine (FLEXERIL) 5 mg tablet Take 1 Tablet by mouth three (3) times daily as needed for Muscle Spasm(s). 15 Tablet 0    amLODIPine (NORVASC) 5 mg tablet Take 1 Tablet by mouth daily. 90 Tablet 3    valsartan-hydroCHLOROthiazide (DIOVAN-HCT) 320-12.5 mg per tablet Take 1 Tablet by mouth daily. 90 Tablet 3    cholecalciferol, vitamin D3, (VITAMIN D3 PO) Take  by mouth daily. triamcinolone acetonide (KENALOG) 0.1 % ointment Apply  to affected area two (2) times a day. Use thin layer to affected areas as needed for skin irritation 30 g 1    biotin 1,000 mcg chew Take  by mouth daily. loratadine (CLARITIN) 10 mg tablet Take 10 mg by mouth. DOCOSAHEXANOIC ACID/EPA (FISH OIL PO) Take 1,000 mg by mouth daily.        Allergies   Allergen Reactions    Macrobid [Nitrofurantoin Monohyd/M-Cryst] Anaphylaxis and Itching     10/15/21: [facility] for facial redness, flushing, swelling, itching, nausea, vomtingin    Ciprofloxacin Nausea Only    Pen-Vee K Rash     Keflex OK       Family History   Problem Relation Age of Onset    Breast Cancer Mother          age 46    Heart Disease Father     Hypertension Father      Social History     Tobacco Use    Smoking status: Former     Packs/day: 1.00     Years: 25.00     Pack years: 25.00     Types: Cigarettes     Quit date: 2008     Years since quittin.0    Smokeless tobacco: Never   Substance Use Topics Alcohol use: No         Julia Laird MD

## 2022-12-01 NOTE — PROGRESS NOTES
Chief Complaint   Patient presents with    Annual Wellness Visit    Cholesterol Problem     Fasting    Hypertension    Pre-diabetes       HISTORY OF PRESENT ILLNESS   Fasting follow up hypercholesterolemia, hypertension, prediabetes, labs and medication check. She has been complying routinely w/ medication regimen updated below. She underwent bladder surgery last November and has subsequently been tried on several bladder agents in addition to this. Her urologist was finally able to get Sebastian Rodney approved and she has been taking this the past few months w/ good symptom control. She had not been checking her home BP's for quite a while bc they had been running so good on a consistent basis in the 130's/80's. But at a recent doctor's appointment her BP was high so she checked it at home recently for the first time in a long time and her home BP was 141/80's then up to 154/80's yesterday. But she has been feeling well. Admits has been under some stress and worry over 's recent diagnosis of prostate cancer. But aside from that generally coming along well. States that last year w/ all her bladder issues was very challenging and this year she feels she is coming along a lot better. Has been enjoying spending time w/ family/friends over the holiday season so far as well.      Diet: regular diet, nothing special just tries to follow a pretty balanced diet  Exercise: since 9-1-22 was walking on treadmill every day and built up to 25 minutes per day but had to stop doing it when her back flared up mid October she has had to put it on hold, getting better and she plans to restart after things slow down from the holidays  Caffeine: 2 cups of coffee a day, several glasses of green tea a day  Weight: had gained some weight since Covid pandemic 3-2020, but now being more active and walking more and has gotten her wt back down from the 140's to 130's now   REVIEW OF SYMPTOMS   Review of Systems   Constitutional: Negative. Eyes: Negative. Respiratory: Negative. Cardiovascular: Negative. Gastrointestinal: Negative. Neurological: Negative. Endo/Heme/Allergies: Negative. Psychiatric/Behavioral: Negative.            PROBLEM LIST/MEDICAL HISTORY     Problem List  Date Reviewed: 11/30/2022            Codes Class Noted    History of tobacco use ICD-10-CM: Z87.891  ICD-9-CM: V15.82  Unknown        Intertriginous dermatitis associated with moisture ICD-10-CM: L30.4  ICD-9-CM: 692.89  10/21/2021    Overview Signed 10/21/2021 10:28 PM by Hortencia Hirsch MD     Submammary              Osteopenia of multiple sites ICD-10-CM: M85.89  ICD-9-CM: 733.90  12/23/2019        Thyroid function study abnormality ICD-10-CM: R94.6  ICD-9-CM: 794.5  11/5/2018        Prediabetes ICD-10-CM: R73.03  ICD-9-CM: 790.29  10/9/2017        Needle phobia ICD-10-CM: F40.298  ICD-9-CM: 300.29  7/19/2017        Female genital prolapse ICD-10-CM: N81.9  ICD-9-CM: 618.9  7/19/2017        Benign essential hypertension ICD-10-CM: I10  ICD-9-CM: 401.1  10/10/2016        Colonoscopy & Mammogram refused ICD-10-CM: Z53.20  ICD-9-CM: V64.2  10/10/2016        High triglycerides ICD-10-CM: E78.1  ICD-9-CM: 272.1  Unknown        Mixed dyslipidemia ICD-10-CM: E78.2  ICD-9-CM: 272.2  Unknown    Overview Signed 11/26/2010  9:18 AM by Christofer Rowland LPN     Low HDL                    PAST SURGICAL HISTORY     Past Surgical History:   Procedure Laterality Date    HX GYN      D&C about 1976    HX SALPINGO-OOPHORECTOMY Bilateral 11/10/2021    Urologist, Dr. Onel Erazo  07/08/2020    Prolapse; Ovaries Intact; Dr. Carrie Kiran    HX TYMPANOSTOMY Right ~2008    Dr Reji Coronado  07/08/2020    Prolapse, Dr. Carrie Kiran    HX UROLOGICAL  11/10/2021    Robotic Sacroplexy/Bladder Sling, Dr. Miguelina Onofre     Current Outpatient Medications   Medication Sig    trospium (SANCTURA) 20 mg tablet Take 20 mg by mouth Before breakfast and dinner. ZINC PO Take 30 mg by mouth every Monday, Wednesday, Friday. ascorbic acid, vitamin C, (Vitamin C) 1,000 mg tablet Take 1,000 mg by mouth every Monday, Wednesday, Friday. glucosamine-chondroitin (ARTHX) 500-400 mg cap Take 1 Capsule by mouth every Monday, Wednesday, Friday, Saturday. cyclobenzaprine (FLEXERIL) 5 mg tablet Take 1 Tablet by mouth three (3) times daily as needed for Muscle Spasm(s). amLODIPine (NORVASC) 5 mg tablet Take 1 Tablet by mouth daily. valsartan-hydroCHLOROthiazide (DIOVAN-HCT) 320-12.5 mg per tablet Take 1 Tablet by mouth daily. cholecalciferol, vitamin D3, (VITAMIN D3 PO) Take  by mouth daily. triamcinolone acetonide (KENALOG) 0.1 % ointment Apply  to affected area two (2) times a day. Use thin layer to affected areas as needed for skin irritation    biotin 1,000 mcg chew Take  by mouth daily. loratadine (CLARITIN) 10 mg tablet Take 10 mg by mouth. DOCOSAHEXANOIC ACID/EPA (FISH OIL PO) Take 1,000 mg by mouth daily. No current facility-administered medications for this visit.           ALLERGIES     Allergies   Allergen Reactions    Macrobid [Nitrofurantoin Monohyd/M-Cryst] Anaphylaxis and Itching     10/15/21: [facility] for facial redness, flushing, swelling, itching, nausea, vomtingin    Ciprofloxacin Nausea Only    Pen-Vee K Rash     Keflex OK          SOCIAL HISTORY     Social History     Tobacco Use    Smoking status: Former     Packs/day: 1.00     Years: 25.00     Pack years: 25.00     Types: Cigarettes     Quit date: 2008     Years since quittin.0    Smokeless tobacco: Never   Substance Use Topics    Alcohol use: No     Social History     Social History Narrative    , retired, 2 sons Stu Wei (local), Da Andrade (in [place] and her grandkids are there as well)    Lives with , Savi Campos        Diet: regular diet, nothing special just tries to follow a pretty balanced diet    Exercise: since 24 was walking on treadmill every day and built up to 25 minutes per day but had to stop doing it when her back flared up mid October she has had to put it on hold, getting better and she plans to restart after things slow down from the holidays    Caffeine: 2 cups of coffee a day, several glasses of green tea a day    Weight: had gained some weight since Covid pandemic 3-, but now being more active and walking more and has gotten her wt back down from the 140's to 130's now         Social History     Substance and Sexual Activity   Sexual Activity Yes    Partners: Male       IMMUNIZATIONS     Immunization History   Administered Date(s) Administered     Influenza, Annie Fall (age 72 y+), High Dose 2020    COVID-19, MODERNA BLUE border, Primary or Immunocompromised, (age 18y+), IM, 100 mcg/0.5mL 2021, 2021, 2022, 2022    H1N1 Influenza Virus Vaccine 11/10/2010    Influenza High Dose Vaccine PF 10/10/2016, 10/10/2017, 10/26/2021, 10/04/2022    Influenza Vaccine 10/07/2013, 2014    Influenza Vaccine (Tri) Adjuvanted (>65 Yrs FLUAD TRI [id number]) 10/04/2018, 2019    Influenza Vaccine Split 10/08/2009, 2011, 10/26/2012    Influenza, FLUARIX, FLULAVAL, Annie Fall (age 10 mo+) AND AFLURIA, (age 1 y+), PF, 0.5mL 10/02/2015    Pneumococcal Polysaccharide (PPSV-23) 10/10/2016    TD Vaccine 2003    Tdap 2014    Zoster Recombinant 2019, 2020         FAMILY HISTORY     Family History   Problem Relation Age of Onset    Breast Cancer Mother          age 46    Heart Disease Father     Hypertension Father          VITALS   Visit Vitals  BP Initial nurse BP check 168/84 left arm, repeated 168/86 left arm; end of exam 146/80 (BP 1 Location: Left upper arm, BP Patient Position: Sitting, BP Cuff Size: Adult)    Pulse 69   Temp 98.2 °F (36.8 °C) (Oral)   Resp 16   Ht 5' 2\" (1.575 m)   Wt 134 lb 3.2 oz (60.9 kg)   SpO2 99%   BMI 24.55 kg/m²          PHYSICAL EXAMINATION Physical Exam  Vitals reviewed. Constitutional:       General: She is not in acute distress. HENT:      Right Ear: Tympanic membrane normal.      Left Ear: Tympanic membrane normal.   Neck:      Thyroid: No thyroid mass, thyromegaly or thyroid tenderness. Vascular: No carotid bruit. Cardiovascular:      Rate and Rhythm: Normal rate and regular rhythm. Heart sounds: Normal heart sounds. Pulmonary:      Effort: Pulmonary effort is normal.      Breath sounds: Normal breath sounds. Abdominal:      General: There is no distension. Palpations: Abdomen is soft. There is no mass. Tenderness: There is no abdominal tenderness. Musculoskeletal:         General: No swelling or tenderness. Cervical back: Neck supple. Right lower leg: No edema. Left lower leg: No edema. Lymphadenopathy:      Cervical: No cervical adenopathy. Skin:     General: Skin is warm and dry. Neurological:      General: No focal deficit present. Mental Status: She is alert and oriented to person, place, and time. Mental status is at baseline. Cranial Nerves: No cranial nerve deficit. Motor: No weakness. Psychiatric:         Mood and Affect: Mood normal.             ASSESSMENT & PLAN   Diagnoses and all orders for this visit:    1. Medicare annual wellness visit, subsequent  See separate note under this same encounter visit for Medicare Wellness note. Age/risk based screening recommendations, health maintenance & prevention counseling. Cancer screening USPTFS guidelines reviewed w/ pt today. Discussed benefits/positive/negative outcomes of screening based on age/risk stratification. Informed consent for/against screening based on pt's personal hx/risk factors. Updated in history above and health maintenance. Patient declines most all HM.      2. Benign essential hypertension, not controlled  Likely due to stress, white coat, and possibly partially related to recent addition of Sanctura bid per Urology which is otherwise working well for her. Increase her Norvasc 5 mg to bid and continue regimen of Diovan--12.5 mg daily for now (wouldn't titrate diuretic component given her OAB/freq issues that are finally correcting)  -     CBC W/O DIFF; Future  -     METABOLIC PANEL, COMPREHENSIVE; Future  -     LIPID PANEL; Future  -     TSH 3RD GENERATION; Future    3. Mixed dyslipidemia  Not treated w/ statin therapy  Preferential management w/ diet/exercise  -     METABOLIC PANEL, COMPREHENSIVE; Future  -     LIPID PANEL; Future  -     TSH 3RD GENERATION; Future    4. Prediabetes  Controlling w/ sensible diet and regular exercise  -     HEMOGLOBIN A1C WITH EAG; Future    5. Borderline abnormal thyroid function test  -     TSH 3RD GENERATION; Future    Fasting labs checked today  Cardiovascular risk and specific lipid/LDL/HgbA1c/BP goals assessed  Reviewed diet, nutrition, exercise, weight management, BMI/goals.   Reviewed medications, effects and possible side effects   Further follow up & other recommendations pending review of labs and interim BP updates on adjusted regimen of the Norvasc as detailed above

## 2022-12-05 DIAGNOSIS — I10 BENIGN ESSENTIAL HYPERTENSION: ICD-10-CM

## 2022-12-05 RX ORDER — AMLODIPINE BESYLATE 10 MG/1
10 TABLET ORAL DAILY
Qty: 90 TABLET | Refills: 0 | Status: SHIPPED | OUTPATIENT
Start: 2022-12-05

## 2022-12-05 NOTE — TELEPHONE ENCOUNTER
----- Message from Khoa Zepeda sent at 12/5/2022  3:01 PM EST -----  Regarding: Lab Results & Recommendations  I have enough Amlodipine 5mg  through  thurs. Dec. 8th (taking 2 a day) Last filled  9/13/22 Blood pressure has been 116/66  123/69  over the last few days. We are going to  get a new monitor tomorrow not sure this one   is working right. We can set up a virtual  appt. or I can come by the office. Thank You  Indu Hugo [birth year]    MJ: We will get the refill sent to the pharmacy. I am not sure if she will send in a new dose or have you continue 2 a day. Be sure to look at the bottle when you get it so you don't take the wrong amount. She wants me to schedule you for a virtual visit. Dr Leora Yepez next available isn't until 1/6. I have you scheduled for Fri 1/6/23 @ 9:00. I double checked with Dr Leora Yepez about that time frame and she said that was fine. Let me know if you can't do that.

## 2022-12-14 ENCOUNTER — TELEPHONE (OUTPATIENT)
Dept: FAMILY MEDICINE CLINIC | Age: 77
End: 2022-12-14

## 2022-12-14 NOTE — TELEPHONE ENCOUNTER
----- Message from Shanta Burroughs MD sent at 12/10/2022 11:08 AM EST -----  Regarding: FW: Kayla Starks [birth year]  She is exactly right and I did put that in my note. I shouldve double checked how it was pended before I signed. I am not sure if the pharmacy would allow her to have a courtesy 2 week supply of the 5 mg bid since it was our error. We can also ask the pharmacist if even though the pill is not scored, would it be ok if she used a pill cutter to half the pill anyway? Can you check w/ them on both these and let me/her know. I have talked to pharmacist and was told that the amlodipine can be cut in half. She said that it is small but with a pill cutter it will work. I called pt and she states that they are out of town. She has been taking the norvasc 10 mg at bedtime. She hasn't had any problem. She hasn't checked her BP b/c they have been busy. She just had another grandchild that was born a week early. I let her know that I had checked with the pharmacist and was told that she can cut the norvasc in half. Pt said that she will do start doing that when she gets home. She will send us some readings then.

## 2022-12-28 RX ORDER — CYCLOBENZAPRINE HCL 5 MG
5 TABLET ORAL
Qty: 15 TABLET | Refills: 0 | Status: SHIPPED | OUTPATIENT
Start: 2022-12-28

## 2023-01-06 ENCOUNTER — VIRTUAL VISIT (OUTPATIENT)
Dept: FAMILY MEDICINE CLINIC | Age: 78
End: 2023-01-06
Payer: MEDICARE

## 2023-01-06 DIAGNOSIS — I10 BENIGN ESSENTIAL HYPERTENSION: Primary | ICD-10-CM

## 2023-01-06 NOTE — PROGRESS NOTES
Diovan [facility] qam and the [facility] qpm  Has been checking BP's at home a few x a week  122/73, 124/73, today 122/74  Heart rates running 72-75

## 2023-01-06 NOTE — PROGRESS NOTES
VIRTUAL VISIT    Patient seen via virtual visit today for the following:  Chief Complaint   Patient presents with    Hypertension     Follow up    Medication Evaluation     Med check on Norvasc       HISTORY OF PRESENT ILLNESS   HPI  Patient seen via virtual visit for follow up BP check and medication evaluation. At her Annual Wellness Visit back in , we increased her Norvasc from 5 mg to 10 mg daily. At that time we discussed having her start by taking the 5 mg tab bid to see if she would tolerate it ok. But unfortunately it was renewed as a 10 mg tab instead. The pharmacist advised her she could break that in half and dose it bid that way but she decided to just go ahead w/ taking the 10 mg dose all at one time and is doing fine w/ it. Initially as her BP's started to come down it make her feel alittle sluggish and tired but she also had a lot going on around that time. But since then for several weeks now she is feeling completely fine and normal. She takes the Diovan--12.5 mg in the AM and the Norvasc 10 mg qpm. This regimen has been working really well for her. She has been checking BP's at home a few x a week and her readings have been running much better. This week her readings have been 122/73, 124/73, today 122/74 and she feels well. Her heart rates are running 72-75 which is in her usual normal range. REVIEW OF SYMPTOMS   Review of Systems   Constitutional: Negative. Eyes: Negative. Respiratory: Negative. Cardiovascular: Negative. Gastrointestinal: Negative. Neurological: Negative.           PROBLEM LIST/MEDICAL HISTORY     Problem List  Date Reviewed: 1/6/2023            Codes Class Noted    History of tobacco use ICD-10-CM: Z87.891  ICD-9-CM: V15.82  Unknown        Intertriginous dermatitis associated with moisture ICD-10-CM: L30.4  ICD-9-CM: 692.89  10/21/2021    Overview Signed 10/21/2021 10:28 PM by Artis Ascencio MD     Submammary              Osteopenia of multiple sites ICD-10-CM: M85.89  ICD-9-CM: 733.90  12/23/2019        Thyroid function study abnormality ICD-10-CM: R94.6  ICD-9-CM: 794.5  11/5/2018        Prediabetes ICD-10-CM: R73.03  ICD-9-CM: 790.29  10/9/2017        Needle phobia ICD-10-CM: F40.298  ICD-9-CM: 300.29  7/19/2017        Female genital prolapse ICD-10-CM: N81.9  ICD-9-CM: 618.9  7/19/2017        Benign essential hypertension ICD-10-CM: I10  ICD-9-CM: 401.1  10/10/2016        Colonoscopy & Mammogram refused ICD-10-CM: Z53.20  ICD-9-CM: V64.2  10/10/2016        High triglycerides ICD-10-CM: E78.1  ICD-9-CM: 272.1  Unknown        Mixed dyslipidemia ICD-10-CM: E78.2  ICD-9-CM: 272.2  Unknown    Overview Signed 11/26/2010  9:18 AM by Yolanda Hernandez LPN     Low HDL                    PAST SURGICAL HISTORY     Past Surgical History:   Procedure Laterality Date    HX GYN      D&C about 1976    HX SALPINGO-OOPHORECTOMY Bilateral 11/10/2021    Urologist, Dr. Mindi Alonso  07/08/2020    Prolapse; Ovaries Intact; Dr. Lu Read    HX TYMPANOSTOMY Right ~2008    Dr Wilmer Oppenheim  07/08/2020    Prolapse, Dr. Lu Read    HX UROLOGICAL  11/10/2021    Robotic Sacroplexy/Bladder Sling, Dr. Alf Prasad         MEDICATIONS     Current Outpatient Medications   Medication Sig    valsartan-hydroCHLOROthiazide (DIOVAN-HCT) 320-12.5 mg per tablet TAKE 1 TABLET BY MOUTH DAILY    amLODIPine (NORVASC) 10 mg tablet Take 1 Tablet by mouth daily. This is an increase (Patient taking differently: Take 10 mg by mouth every evening.)    trospium (SANCTURA) 20 mg tablet Take 20 mg by mouth Before breakfast and dinner. ZINC PO Take 30 mg by mouth every Monday, Wednesday, Friday. ascorbic acid, vitamin C, (VITAMIN C) 1,000 mg tablet Take 1,000 mg by mouth every Monday, Wednesday, Friday. glucosamine-chondroitin (ARTHX) 500-400 mg cap Take 1 Capsule by mouth every Monday, Wednesday, Friday, Saturday.     cholecalciferol, vitamin D3, (VITAMIN D3 PO) Take  by mouth daily. biotin 1,000 mcg chew Take  by mouth daily. loratadine (CLARITIN) 10 mg tablet Take 10 mg by mouth. DOCOSAHEXANOIC ACID/EPA (FISH OIL PO) Take 1,000 mg by mouth daily. cyclobenzaprine (FLEXERIL) 5 mg tablet Take 1 Tablet by mouth three (3) times daily as needed for Muscle Spasm(s). triamcinolone acetonide (KENALOG) 0.1 % ointment Apply  to affected area two (2) times a day. Use thin layer to affected areas as needed for skin irritation     No current facility-administered medications for this visit.           ALLERGIES     Allergies   Allergen Reactions    Macrobid [Nitrofurantoin Monohyd/M-Cryst] Anaphylaxis and Itching     10/15/21: [facility] ER for facial redness, flushing, swelling, itching, nausea, vomtingin    Ciprofloxacin Nausea Only    Pen-Vee K Rash     Keflex OK          SOCIAL HISTORY     Social History     Tobacco Use    Smoking status: Former     Packs/day: 1.00     Years: 25.00     Pack years: 25.00     Types: Cigarettes     Quit date: 2008     Years since quittin.1    Smokeless tobacco: Never   Substance Use Topics    Alcohol use: No     Social History     Social History Narrative    , retired, 2 sons [place] (local), [place] (in [place] and her grandkids are there as well)    Lives with , Lavonne Zhang        Diet: regular diet, nothing special just tries to follow a pretty balanced diet    Exercise: since 24 was walking on treadmill every day and built up to 25 minutes per day but had to stop doing it when her back flared up mid October she has had to put it on hold, getting better and she plans to restart after things slow down from the holidays    Caffeine: 2 cups of coffee a day, several glasses of green tea a day    Weight: had gained some weight since Covid pandemic 3-2020, but now being more active and walking more and has gotten her wt back down from the 140's to 130's now         Social History     Substance and Sexual Activity   Sexual Activity Yes    Partners: Male       IMMUNIZATIONS     Immunization History   Administered Date(s) Administered     Influenza, FLUZONE (age 72 y+), High Dose 2020    COVID-19, MODERNA BLUE border, Primary or Immunocompromised, (age 18y+), IM, 100 mcg/0.5mL 2021, 2021, 2022, 2022    H1N1 Influenza Virus Vaccine 11/10/2010    Influenza High Dose Vaccine PF 10/10/2016, 10/10/2017, 10/26/2021, 10/04/2022    Influenza Vaccine 10/07/2013, 2014    Influenza Vaccine (Tri) Adjuvanted (>65 Yrs FLUAD TRI [id number]) 10/04/2018, 2019    Influenza Vaccine Split 10/08/2009, 2011, 10/26/2012    Influenza, FLUARIX, FLULAVAL, Braman Poot (age 10 mo+) AND AFLURIA, (age 1 y+), PF, 0.5mL 10/02/2015    Pneumococcal Polysaccharide (PPSV-23) 10/10/2016    TD Vaccine 2003    Tdap 2014    Zoster Recombinant 2019, 2020         FAMILY HISTORY     Family History   Problem Relation Age of Onset    Breast Cancer Mother          age 46    Heart Disease Father     Hypertension Father          VITALS   There were no vitals available for today's virtual visit. Any patient self reported vitals are noted below:  Patient-Reported Vitals 2023   Patient-Reported Weight 132.6   Patient-Reported Pulse 74   Patient-Reported Temperature 96.9   Patient-Reported Systolic  795   Patient-Reported Diastolic 74           PHYSICAL EXAMINATION   Physical Exam  Constitutional:       General: She is not in acute distress. Appearance: Normal appearance. Cardiovascular:      Rate and Rhythm: Normal rate. Pulmonary:      Effort: Pulmonary effort is normal. No respiratory distress. Neurological:      General: No focal deficit present. Mental Status: She is alert and oriented to person, place, and time. Mental status is at baseline.               LABORATORY DATA/ANCILLARY/IMAGING     Results for orders placed or performed in visit on 22   HEMOGLOBIN A1C WITH EAG   Result Value Ref Range    Hemoglobin A1c 5.4 4.0 - 5.6 %    Est. average glucose 108 mg/dL   TSH 3RD GENERATION   Result Value Ref Range    TSH 2.19 0.36 - 3.74 uIU/mL   LIPID PANEL   Result Value Ref Range    Cholesterol, total 189 <200 MG/DL    Triglyceride 113 <150 MG/DL    HDL Cholesterol 62 MG/DL    LDL, calculated 104.4 (H) 0 - 100 MG/DL    VLDL, calculated 22.6 MG/DL    CHOL/HDL Ratio 3.0 0.0 - 5.0     METABOLIC PANEL, COMPREHENSIVE   Result Value Ref Range    Sodium 138 136 - 145 mmol/L    Potassium 4.6 3.5 - 5.1 mmol/L    Chloride 103 97 - 108 mmol/L    CO2 28 21 - 32 mmol/L    Anion gap 7 5 - 15 mmol/L    Glucose 110 (H) 65 - 100 mg/dL    BUN 11 6 - 20 MG/DL    Creatinine 0.74 0.55 - 1.02 MG/DL    BUN/Creatinine ratio 15 12 - 20      eGFR >60 >60 ml/min/1.73m2    Calcium 9.6 8.5 - 10.1 MG/DL    Bilirubin, total 0.6 0.2 - 1.0 MG/DL    ALT (SGPT) 27 12 - 78 U/L    AST (SGOT) 16 15 - 37 U/L    Alk. phosphatase 91 45 - 117 U/L    Protein, total 7.8 6.4 - 8.2 g/dL    Albumin 4.3 3.5 - 5.0 g/dL    Globulin 3.5 2.0 - 4.0 g/dL    A-G Ratio 1.2 1.1 - 2.2     CBC W/O DIFF   Result Value Ref Range    WBC 5.6 3.6 - 11.0 K/uL    RBC 4.36 3.80 - 5.20 M/uL    HGB 13.2 11.5 - 16.0 g/dL    HCT 40.5 35.0 - 47.0 %    MCV 92.9 80.0 - 99.0 FL    MCH 30.3 26.0 - 34.0 PG    MCHC 32.6 30.0 - 36.5 g/dL    RDW 12.7 11.5 - 14.5 %    PLATELET 170 121 - 948 K/uL    MPV 10.0 8.9 - 12.9 FL    NRBC 0.0 0  WBC    ABSOLUTE NRBC 0.00 0.00 - 0.01 K/uL             ASSESSMENT & PLAN       ICD-10-CM ICD-9-CM    1. Benign essential hypertension  I10 401.1         Improved on current regimen and tolerating well. No changes at this time. Continue Diovan--12.5 mg qam and Norvasc 10 mg qpm.  Reviewed medications, effects, risks/benefits, precautions, potential interactions and possible side effects  Reviewed diet, nutrition, exercise, and specific BP goals for age   She is already scheduled for her next Medicare AWV in . Return sooner in the interim for any problems or concerns. Patient was evaluated through a synchronous (real-time) audio-video encounter. The patient (or guardian if applicable) is aware that this is a billable service. Verbal consent to proceed has been obtained within the past 12 months. The visit was conducted pursuant to the emergency declaration under the [address] [place] [address] and the [employer] [facility] Act. Patient identification was verified, and a caregiver was present when appropriate. The patient was located in a state where the provider was credentialed to provide care. Ad Cox, was evaluated through a synchronous (real-time) audio-video encounter. The patient (or guardian if applicable) is aware that this is a billable service, which includes applicable co-pays. This Virtual Visit was conducted with patient's (and/or legal guardian's) consent. The visit was conducted pursuant to the emergency declaration under the [address] [place] [address] and the [employer] Act. Patient identification was verified, and a caregiver was present when appropriate. The patient was located at: Home: [address] [address]  The provider was located at: Home: [unfilled]      --Екатерина Anna MD on 1/6/2023 at 9:35 AM    An [address] [facility] was used to authenticate this note. Total Time: minutes: 20 . An electronic signature was used to authenticate this note.   ~Dilcia Pearce MD~

## 2023-03-06 DIAGNOSIS — I10 BENIGN ESSENTIAL HYPERTENSION: ICD-10-CM

## 2023-03-07 DIAGNOSIS — I10 BENIGN ESSENTIAL HYPERTENSION: ICD-10-CM

## 2023-03-07 RX ORDER — AMLODIPINE BESYLATE 10 MG/1
10 TABLET ORAL DAILY
Qty: 90 TABLET | Refills: 2 | Status: SHIPPED | OUTPATIENT
Start: 2023-03-07

## 2023-03-07 NOTE — TELEPHONE ENCOUNTER
Patient mychart request for refill. ThanksKavya    Last Visit: VV 1/6/23 MD PORTILLO, labs 11/2022  Next Appointment: 12/5/23 MD PORTILLO  Previous Refill Encounter(s): 12/5/22 90    Requested Prescriptions     Pending Prescriptions Disp Refills    amLODIPine (NORVASC) 10 mg tablet 90 Tablet 1     Sig: Take 1 Tablet by mouth daily. For Pharmacy Admin Tracking Only    Program: Medication Refill  CPA in place:   Recommendation Provided To:    Intervention Detail: New Rx: 1, reason: Patient Preference  Intervention Accepted By:   Velma Banks Closed?:   Time Spent (min): 5

## 2023-04-04 RX ORDER — CYCLOBENZAPRINE HCL 5 MG
5 TABLET ORAL
Qty: 30 TABLET | Refills: 1 | Status: SHIPPED
Start: 2023-04-04

## 2023-04-04 RX ORDER — CYCLOBENZAPRINE HCL 5 MG
5 TABLET ORAL
Qty: 15 TABLET | Refills: 0 | Status: CANCELLED
Start: 2023-04-04

## 2023-04-04 NOTE — TELEPHONE ENCOUNTER
PCP: Dayana Lowe MD    Last appt: 1/6/2023  Future Appointments   Date Time Provider Brittny Dumont   12/5/2023  8:00 AM Melanie Bajwa MD [facility]       Requested Prescriptions     Pending Prescriptions Disp Refills    cyclobenzaprine (FLEXERIL) 5 mg tablet 15 Tablet 0     Sig: Take 1 Tablet by mouth three (3) times daily as needed for Muscle Spasm(s).        Prior labs and Blood pressures:  BP Readings from Last 3 Encounters:   11/30/22 (!) 146/80   11/29/21 136/80   10/16/21 131/66     Lab Results   Component Value Date/Time    Sodium 138 11/30/2022 10:20 AM    Potassium 4.6 11/30/2022 10:20 AM    Chloride 103 11/30/2022 10:20 AM    CO2 28 11/30/2022 10:20 AM    Anion gap 7 11/30/2022 10:20 AM    Glucose 110 (H) 11/30/2022 10:20 AM    BUN 11 11/30/2022 10:20 AM    Creatinine 0.74 11/30/2022 10:20 AM    BUN/Creatinine ratio 15 11/30/2022 10:20 AM    GFR est AA >60 12/17/2021 01:27 PM    GFR est non-AA >60 12/17/2021 01:27 PM    Calcium 9.6 11/30/2022 10:20 AM     Lab Results   Component Value Date/Time    Hemoglobin A1c 5.4 11/30/2022 10:20 AM     Lab Results   Component Value Date/Time    Cholesterol, total 189 11/30/2022 10:20 AM    HDL Cholesterol 62 11/30/2022 10:20 AM    LDL, calculated 104.4 (H) 11/30/2022 10:20 AM    VLDL, calculated 22.6 11/30/2022 10:20 AM    Triglyceride 113 11/30/2022 10:20 AM    CHOL/HDL Ratio 3.0 11/30/2022 10:20 AM     Lab Results   Component Value Date/Time    Vitamin D 25-Hydroxy 19.6 (L) 01/12/2011 08:31 AM    VITAMIN D, 25-HYDROXY 40.5 10/02/2015 09:09 AM       Lab Results   Component Value Date/Time    TSH 2.19 11/30/2022 10:20 AM

## 2023-06-03 DIAGNOSIS — I10 BENIGN ESSENTIAL HYPERTENSION: Primary | ICD-10-CM

## 2023-06-06 RX ORDER — VALSARTAN AND HYDROCHLOROTHIAZIDE 320; 12.5 MG/1; MG/1
1 TABLET, FILM COATED ORAL DAILY
Qty: 90 TABLET | Refills: 1 | Status: SHIPPED | OUTPATIENT
Start: 2023-06-06

## 2023-06-06 NOTE — TELEPHONE ENCOUNTER
Chief Complaint   Patient presents with    Medication Refill     Last Office visit 01/06/2023  Next Follow up 12/05/2023

## 2023-11-30 DIAGNOSIS — I10 BENIGN ESSENTIAL HYPERTENSION: ICD-10-CM

## 2023-12-01 RX ORDER — VALSARTAN AND HYDROCHLOROTHIAZIDE 320; 12.5 MG/1; MG/1
1 TABLET, FILM COATED ORAL DAILY
Qty: 90 TABLET | Refills: 0 | Status: SHIPPED | OUTPATIENT
Start: 2023-12-01

## 2023-12-04 ENCOUNTER — TELEPHONE (OUTPATIENT)
Age: 78
End: 2023-12-04

## 2023-12-04 SDOH — ECONOMIC STABILITY: FOOD INSECURITY: WITHIN THE PAST 12 MONTHS, YOU WORRIED THAT YOUR FOOD WOULD RUN OUT BEFORE YOU GOT MONEY TO BUY MORE.: NEVER TRUE

## 2023-12-04 SDOH — HEALTH STABILITY: PHYSICAL HEALTH: ON AVERAGE, HOW MANY DAYS PER WEEK DO YOU ENGAGE IN MODERATE TO STRENUOUS EXERCISE (LIKE A BRISK WALK)?: 3 DAYS

## 2023-12-04 SDOH — ECONOMIC STABILITY: TRANSPORTATION INSECURITY
IN THE PAST 12 MONTHS, HAS LACK OF TRANSPORTATION KEPT YOU FROM MEETINGS, WORK, OR FROM GETTING THINGS NEEDED FOR DAILY LIVING?: NO

## 2023-12-04 SDOH — HEALTH STABILITY: PHYSICAL HEALTH: ON AVERAGE, HOW MANY MINUTES DO YOU ENGAGE IN EXERCISE AT THIS LEVEL?: 10 MIN

## 2023-12-04 SDOH — ECONOMIC STABILITY: INCOME INSECURITY: HOW HARD IS IT FOR YOU TO PAY FOR THE VERY BASICS LIKE FOOD, HOUSING, MEDICAL CARE, AND HEATING?: NOT VERY HARD

## 2023-12-04 SDOH — ECONOMIC STABILITY: HOUSING INSECURITY
IN THE LAST 12 MONTHS, WAS THERE A TIME WHEN YOU DID NOT HAVE A STEADY PLACE TO SLEEP OR SLEPT IN A SHELTER (INCLUDING NOW)?: NO

## 2023-12-04 SDOH — ECONOMIC STABILITY: FOOD INSECURITY: WITHIN THE PAST 12 MONTHS, THE FOOD YOU BOUGHT JUST DIDN'T LAST AND YOU DIDN'T HAVE MONEY TO GET MORE.: NEVER TRUE

## 2023-12-04 ASSESSMENT — PATIENT HEALTH QUESTIONNAIRE - PHQ9
SUM OF ALL RESPONSES TO PHQ QUESTIONS 1-9: 0
SUM OF ALL RESPONSES TO PHQ9 QUESTIONS 1 & 2: 0
SUM OF ALL RESPONSES TO PHQ QUESTIONS 1-9: 0
2. FEELING DOWN, DEPRESSED OR HOPELESS: 0
SUM OF ALL RESPONSES TO PHQ QUESTIONS 1-9: 0
1. LITTLE INTEREST OR PLEASURE IN DOING THINGS: 0
SUM OF ALL RESPONSES TO PHQ QUESTIONS 1-9: 0

## 2023-12-04 ASSESSMENT — LIFESTYLE VARIABLES
HOW OFTEN DO YOU HAVE A DRINK CONTAINING ALCOHOL: NEVER
HOW MANY STANDARD DRINKS CONTAINING ALCOHOL DO YOU HAVE ON A TYPICAL DAY: PATIENT DOES NOT DRINK

## 2023-12-04 NOTE — TELEPHONE ENCOUNTER
Left  [phone] for pt to reschedule appt she has 12/5/23 due to Dr. GRIFFIN out of office-TM 12/4/23

## 2023-12-05 ENCOUNTER — TELEPHONE (OUTPATIENT)
Age: 78
End: 2023-12-05

## 2023-12-05 DIAGNOSIS — R73.03 PREDIABETES: ICD-10-CM

## 2023-12-05 DIAGNOSIS — E78.2 MIXED DYSLIPIDEMIA: Primary | ICD-10-CM

## 2023-12-05 DIAGNOSIS — I10 BENIGN ESSENTIAL HYPERTENSION: ICD-10-CM

## 2023-12-05 DIAGNOSIS — R94.6 ABNORMAL RESULTS OF THYROID FUNCTION STUDIES: ICD-10-CM

## 2023-12-05 NOTE — TELEPHONE ENCOUNTER
Spoke to pt on 12.5.23 informed her that Dr. Houston Orozco was going to be out of the office on 12.5.23. So we re-scheduled her AMW for 1.3.24 @ 11:40 AM however pt cannot wait that long to fast.Pt would like to come in some morning just to have Lab's drawn. Please have  place new order's.

## 2023-12-07 DIAGNOSIS — Z11.59 NEED FOR HEPATITIS C SCREENING TEST: Primary | ICD-10-CM

## 2023-12-08 RX ORDER — AMLODIPINE BESYLATE 10 MG/1
10 TABLET ORAL DAILY
Qty: 90 TABLET | Refills: 0 | OUTPATIENT
Start: 2023-12-08

## 2023-12-08 NOTE — TELEPHONE ENCOUNTER
Fasting lab orders placed. [de-identified] : 27yo female with PMHx of PCOS, GERD and morbid obesity BMI 50 presenting for consultation of weight loss surgery/management. Previous weight loss attempts include various diets and exercise regimens with limited success. Patient reports dyspnea upon exertion, but never at rest. She reports back pain, which she attributes to her weight and breast size. She has migraines and a slipped disc in her neck, for which she takes Cymbalta. She states that she has heartburn that is generally controlled with Omeprazole. She recently went to the ER for evaluation of severe reflux. Last EG 9/2019 that showed gastritis. No prior colonoscopy. She has a close friend that had a gastric bypass, to whom she has asked questions.\par

## 2023-12-11 NOTE — TELEPHONE ENCOUNTER
LVM for pt that I would be sending her a [employer] message about getting labs done prior to her visit so she can discuss result with Dr Chrissy Eckert.

## 2023-12-31 SDOH — HEALTH STABILITY: PHYSICAL HEALTH: ON AVERAGE, HOW MANY MINUTES DO YOU ENGAGE IN EXERCISE AT THIS LEVEL?: 10 MIN

## 2023-12-31 SDOH — ECONOMIC STABILITY: INCOME INSECURITY: HOW HARD IS IT FOR YOU TO PAY FOR THE VERY BASICS LIKE FOOD, HOUSING, MEDICAL CARE, AND HEATING?: NOT VERY HARD

## 2023-12-31 SDOH — ECONOMIC STABILITY: FOOD INSECURITY: WITHIN THE PAST 12 MONTHS, YOU WORRIED THAT YOUR FOOD WOULD RUN OUT BEFORE YOU GOT MONEY TO BUY MORE.: NEVER TRUE

## 2023-12-31 SDOH — HEALTH STABILITY: PHYSICAL HEALTH: ON AVERAGE, HOW MANY DAYS PER WEEK DO YOU ENGAGE IN MODERATE TO STRENUOUS EXERCISE (LIKE A BRISK WALK)?: 3 DAYS

## 2023-12-31 SDOH — ECONOMIC STABILITY: FOOD INSECURITY: WITHIN THE PAST 12 MONTHS, THE FOOD YOU BOUGHT JUST DIDN'T LAST AND YOU DIDN'T HAVE MONEY TO GET MORE.: NEVER TRUE

## 2023-12-31 ASSESSMENT — PATIENT HEALTH QUESTIONNAIRE - PHQ9
2. FEELING DOWN, DEPRESSED OR HOPELESS: 0
SUM OF ALL RESPONSES TO PHQ9 QUESTIONS 1 & 2: 0
SUM OF ALL RESPONSES TO PHQ QUESTIONS 1-9: 0
1. LITTLE INTEREST OR PLEASURE IN DOING THINGS: 0
SUM OF ALL RESPONSES TO PHQ QUESTIONS 1-9: 0

## 2023-12-31 ASSESSMENT — LIFESTYLE VARIABLES
HOW MANY STANDARD DRINKS CONTAINING ALCOHOL DO YOU HAVE ON A TYPICAL DAY: 0
HOW MANY STANDARD DRINKS CONTAINING ALCOHOL DO YOU HAVE ON A TYPICAL DAY: PATIENT DOES NOT DRINK
HOW OFTEN DO YOU HAVE A DRINK CONTAINING ALCOHOL: 1
HOW OFTEN DO YOU HAVE SIX OR MORE DRINKS ON ONE OCCASION: 1
HOW OFTEN DO YOU HAVE A DRINK CONTAINING ALCOHOL: NEVER

## 2024-01-03 ENCOUNTER — NURSE ONLY (OUTPATIENT)
Age: 79
End: 2024-01-03

## 2024-01-03 ENCOUNTER — OFFICE VISIT (OUTPATIENT)
Age: 79
End: 2024-01-03
Payer: MEDICARE

## 2024-01-03 VITALS
BODY MASS INDEX: 26.23 KG/M2 | OXYGEN SATURATION: 98 % | HEIGHT: 60 IN | HEART RATE: 92 BPM | SYSTOLIC BLOOD PRESSURE: 128 MMHG | DIASTOLIC BLOOD PRESSURE: 74 MMHG | TEMPERATURE: 97.5 F | WEIGHT: 133.6 LBS | RESPIRATION RATE: 16 BRPM

## 2024-01-03 DIAGNOSIS — Z00.00 MEDICARE ANNUAL WELLNESS VISIT, SUBSEQUENT: Primary | ICD-10-CM

## 2024-01-03 DIAGNOSIS — R73.03 PREDIABETES: ICD-10-CM

## 2024-01-03 DIAGNOSIS — I10 BENIGN ESSENTIAL HYPERTENSION: ICD-10-CM

## 2024-01-03 DIAGNOSIS — E78.2 MIXED DYSLIPIDEMIA: ICD-10-CM

## 2024-01-03 DIAGNOSIS — R94.6 THYROID FUNCTION STUDY ABNORMALITY: ICD-10-CM

## 2024-01-03 DIAGNOSIS — Z11.59 NEED FOR HEPATITIS C SCREENING TEST: ICD-10-CM

## 2024-01-03 DIAGNOSIS — R94.6 ABNORMAL RESULTS OF THYROID FUNCTION STUDIES: ICD-10-CM

## 2024-01-03 LAB
ALBUMIN SERPL-MCNC: 4.1 G/DL (ref 3.5–5)
ALBUMIN/GLOB SERPL: 1.2 (ref 1.1–2.2)
ALP SERPL-CCNC: 86 U/L (ref 45–117)
ALT SERPL-CCNC: 31 U/L (ref 12–78)
ANION GAP SERPL CALC-SCNC: 6 MMOL/L (ref 5–15)
AST SERPL-CCNC: 13 U/L (ref 15–37)
BILIRUB SERPL-MCNC: 0.7 MG/DL (ref 0.2–1)
BUN SERPL-MCNC: 14 MG/DL (ref 6–20)
BUN/CREAT SERPL: 16 (ref 12–20)
CALCIUM SERPL-MCNC: 9.1 MG/DL (ref 8.5–10.1)
CHLORIDE SERPL-SCNC: 106 MMOL/L (ref 97–108)
CHOLEST SERPL-MCNC: 187 MG/DL
CO2 SERPL-SCNC: 28 MMOL/L (ref 21–32)
CREAT SERPL-MCNC: 0.88 MG/DL (ref 0.55–1.02)
ERYTHROCYTE [DISTWIDTH] IN BLOOD BY AUTOMATED COUNT: 12.7 % (ref 11.5–14.5)
EST. AVERAGE GLUCOSE BLD GHB EST-MCNC: 108 MG/DL
GLOBULIN SER CALC-MCNC: 3.3 G/DL (ref 2–4)
GLUCOSE SERPL-MCNC: 105 MG/DL (ref 65–100)
HBA1C MFR BLD: 5.4 % (ref 4–5.6)
HCT VFR BLD AUTO: 37.5 % (ref 35–47)
HCV AB SER IA-ACNC: 0.07 INDEX
HCV AB SERPL QL IA: NONREACTIVE
HDLC SERPL-MCNC: 64 MG/DL
HDLC SERPL: 2.9 (ref 0–5)
HGB BLD-MCNC: 12.8 G/DL (ref 11.5–16)
LDLC SERPL CALC-MCNC: 104.2 MG/DL (ref 0–100)
MCH RBC QN AUTO: 30 PG (ref 26–34)
MCHC RBC AUTO-ENTMCNC: 34.1 G/DL (ref 30–36.5)
MCV RBC AUTO: 87.8 FL (ref 80–99)
NRBC # BLD: 0 K/UL (ref 0–0.01)
NRBC BLD-RTO: 0 PER 100 WBC
PLATELET # BLD AUTO: 218 K/UL (ref 150–400)
PMV BLD AUTO: 9.9 FL (ref 8.9–12.9)
POTASSIUM SERPL-SCNC: 4.2 MMOL/L (ref 3.5–5.1)
PROT SERPL-MCNC: 7.4 G/DL (ref 6.4–8.2)
RBC # BLD AUTO: 4.27 M/UL (ref 3.8–5.2)
SODIUM SERPL-SCNC: 140 MMOL/L (ref 136–145)
TRIGL SERPL-MCNC: 94 MG/DL
TSH SERPL DL<=0.05 MIU/L-ACNC: 1.61 UIU/ML (ref 0.36–3.74)
VLDLC SERPL CALC-MCNC: 18.8 MG/DL
WBC # BLD AUTO: 5.4 K/UL (ref 3.6–11)

## 2024-01-03 PROCEDURE — 1036F TOBACCO NON-USER: CPT | Performed by: FAMILY MEDICINE

## 2024-01-03 PROCEDURE — G8419 CALC BMI OUT NRM PARAM NOF/U: HCPCS | Performed by: FAMILY MEDICINE

## 2024-01-03 PROCEDURE — G8427 DOCREV CUR MEDS BY ELIG CLIN: HCPCS | Performed by: FAMILY MEDICINE

## 2024-01-03 PROCEDURE — G0439 PPPS, SUBSEQ VISIT: HCPCS | Performed by: FAMILY MEDICINE

## 2024-01-03 PROCEDURE — G8484 FLU IMMUNIZE NO ADMIN: HCPCS | Performed by: FAMILY MEDICINE

## 2024-01-03 PROCEDURE — 1123F ACP DISCUSS/DSCN MKR DOCD: CPT | Performed by: FAMILY MEDICINE

## 2024-01-03 PROCEDURE — 3074F SYST BP LT 130 MM HG: CPT | Performed by: FAMILY MEDICINE

## 2024-01-03 PROCEDURE — 99214 OFFICE O/P EST MOD 30 MIN: CPT | Performed by: FAMILY MEDICINE

## 2024-01-03 PROCEDURE — 1090F PRES/ABSN URINE INCON ASSESS: CPT | Performed by: FAMILY MEDICINE

## 2024-01-03 PROCEDURE — 3078F DIAST BP <80 MM HG: CPT | Performed by: FAMILY MEDICINE

## 2024-01-03 PROCEDURE — G8399 PT W/DXA RESULTS DOCUMENT: HCPCS | Performed by: FAMILY MEDICINE

## 2024-01-03 RX ORDER — AMOXICILLIN 500 MG
1200 CAPSULE ORAL DAILY
COMMUNITY

## 2024-01-03 NOTE — PROGRESS NOTES
Chief Complaint   Patient presents with    Medicare AWV         Cholesterol Problem     Had labs done this morning    Hypertension       1. \"Have you been to the ER, urgent care clinic since your last visit?  Hospitalized since your last visit?\" [facility] in Dec for back pain    2. \"Have you seen or consulted any other health care providers outside of the [facility] since your last visit?\" Ortho Dr Ivy, ENT Dr Alana Smalls for dizziness,     3. For patients aged 45-75: Has the patient had a colonoscopy / FIT/ Cologuard? No      If the patient is female:    4. For patients aged 40-74: Has the patient had a mammogram within the past 2 years? No hasn't had one in years      5. For patients aged 21-65: Has the patient had a pap smear  
   Influenza, High Dose (Fluzone 65 yrs and older) 10/10/2016, 10/10/2017, 10/26/2021, 10/04/2022    Influenza, Triv, inactivated, subunit, adjuvanted, IM (Fluad 65 yrs and older) 10/04/2018, 2019    Pneumococcal, PPSV23, PNEUMOVAX 23, (age 2y+), SC/IM, 0.5mL 10/10/2016    TDaP, ADACEL (age 10y-64y), BOOSTRIX (age 10y+), IM, 0.5mL 2014    Td vaccine (adult) 2003    Zoster Recombinant (Shingrix) 2019, 2020     Family History   Problem Relation Age of Onset    Heart Disease Father     Hypertension Father     Breast Cancer Mother          age 52         Patient's complete Health Risk Assessment and screening values have been reviewed and are found in Flowsheets. The following problems were reviewed today and where indicated follow up appointments were made and/or referrals ordered.    Positive Risk Factor Screenings with Interventions:                      Advanced Directives:  Do you have a Living Will?: (!) No    Intervention:  has NO advanced directive - information provided            Objective   Vitals:    24 1144   BP: 128/74   Site: Left Upper Arm   Position: Sitting   Cuff Size: Large Adult   Pulse: 92   Resp: 16   Temp: 97.5 °F (36.4 °C)   TempSrc: Oral   SpO2: 98%   Weight: 60.6 kg (133 lb 9.6 oz)   Height: 1.524 m (5')      Body mass index is 26.09 kg/m².        General Appearance: alert and oriented to person, place and time, well-developed and well nourished, and in no acute distress  ENT: bilateral tympanic membrane normal  Neck: neck supple and non tender without mass, no thyromegaly, no thyroid nodules, and no cervical adenopathy   Cardiovascular: normal rate, no gallops, and no carotid bruits, occ extrasystoles  Abdomen: soft, non-tender, non-distended, normal bowel sounds, no masses or organomegaly  Extremities: mild non-pitting ankle edema, no tenderness  Neurologic: gait and coordination normal and speech normal             CareTeam (Including outside

## 2024-02-28 DIAGNOSIS — I10 BENIGN ESSENTIAL HYPERTENSION: ICD-10-CM

## 2024-02-28 RX ORDER — VALSARTAN AND HYDROCHLOROTHIAZIDE 320; 12.5 MG/1; MG/1
1 TABLET, FILM COATED ORAL DAILY
Qty: 90 TABLET | Refills: 2 | Status: SHIPPED | OUTPATIENT
Start: 2024-02-28

## 2024-03-02 RX ORDER — AMLODIPINE BESYLATE 10 MG/1
10 TABLET ORAL DAILY
Qty: 90 TABLET | Refills: 2 | Status: SHIPPED | OUTPATIENT
Start: 2024-03-02

## 2024-08-01 ENCOUNTER — TELEPHONE (OUTPATIENT)
Age: 79
End: 2024-08-01

## 2024-08-01 NOTE — TELEPHONE ENCOUNTER
----- Message from Stivenmaytenohemi [place] sent at 8/1/2024  9:24 AM EDT -----  Regarding: [facility] Appointment Request  [facility] Appointment Request    Patient needs appointment for [facility] Appointment Type: Annual Visit.    Patient Requested Dates(s):December 9 , 2024  Patient Requested Time:PT prefers early morning schedule  Provider Name:Kiya Gottlieb MD    Reason for Appointment Request: Established patient - No schedule available during search  --------------------------------------------------------------------------------------------------------------------------    Relationship to Patient: Self     Call Back Information: -OK to leave message on voicemail  Preferred Call Back Number: Phone [phone]

## 2024-11-26 DIAGNOSIS — I10 BENIGN ESSENTIAL HYPERTENSION: ICD-10-CM

## 2024-11-27 NOTE — TELEPHONE ENCOUNTER
PCP: Kiya Gottlieb MD    Last appt: 1/3/2024     Future Appointments   Date Time Provider Department Center   2/13/2025  8:40 AM Kiya Gottlieb MD [facility]       Requested Prescriptions     Pending Prescriptions Disp Refills    amLODIPine (NORVASC) 10 MG tablet [Pharmacy Med Name: AMLODIPINE BESYLATE 10MG TABLETS] 90 tablet 2     Sig: TAKE 1 TABLET BY MOUTH DAILY    valsartan-hydroCHLOROthiazide (DIOVAN-HCT) 320-12.5 MG per tablet [Pharmacy Med Name: VALSARTAN/HCTZ 320MG/12.5MG TABLETS] 90 tablet 2     Sig: TAKE 1 TABLET BY MOUTH DAILY       Prior labs and Blood pressures:  BP Readings from Last 3 Encounters:   01/03/24 128/74   11/30/22 (!) 146/80   11/29/21 136/80     Lab Results   Component Value Date/Time     01/03/2024 10:18 AM    K 4.2 01/03/2024 10:18 AM     01/03/2024 10:18 AM    CO2 28 01/03/2024 10:18 AM    BUN 14 01/03/2024 10:18 AM    GFRAA >60 12/17/2021 01:27 PM     No results found for: \"HBA1C\", \"[id number]\"  Lab Results   Component Value Date/Time    CHOL 187 01/03/2024 10:18 AM    HDL 64 01/03/2024 10:18 AM    .2 01/03/2024 10:18 AM    VLDL 18.8 01/03/2024 10:18 AM     No results found for: \"VITD3\"    Lab Results   Component Value Date/Time    TSH 1.61 01/03/2024 10:18 AM

## 2024-11-28 RX ORDER — AMLODIPINE BESYLATE 10 MG/1
10 TABLET ORAL DAILY
Qty: 90 TABLET | Refills: 0 | Status: SHIPPED | OUTPATIENT
Start: 2024-11-28

## 2024-11-28 RX ORDER — VALSARTAN AND HYDROCHLOROTHIAZIDE 320; 12.5 MG/1; MG/1
1 TABLET, FILM COATED ORAL DAILY
Qty: 90 TABLET | Refills: 0 | Status: SHIPPED | OUTPATIENT
Start: 2024-11-28

## 2025-01-18 DIAGNOSIS — R94.6 THYROID FUNCTION STUDY ABNORMALITY: ICD-10-CM

## 2025-01-18 DIAGNOSIS — E78.2 MIXED DYSLIPIDEMIA: ICD-10-CM

## 2025-01-18 DIAGNOSIS — I10 BENIGN ESSENTIAL HYPERTENSION: Primary | ICD-10-CM

## 2025-01-18 DIAGNOSIS — R73.03 PREDIABETES: ICD-10-CM

## 2025-02-07 ENCOUNTER — LAB (OUTPATIENT)
Age: 80
End: 2025-02-07

## 2025-02-07 DIAGNOSIS — R94.6 THYROID FUNCTION STUDY ABNORMALITY: ICD-10-CM

## 2025-02-07 DIAGNOSIS — R73.03 PREDIABETES: ICD-10-CM

## 2025-02-07 DIAGNOSIS — E78.2 MIXED DYSLIPIDEMIA: ICD-10-CM

## 2025-02-07 DIAGNOSIS — I10 BENIGN ESSENTIAL HYPERTENSION: ICD-10-CM

## 2025-02-07 LAB
ALBUMIN SERPL-MCNC: 4.1 G/DL (ref 3.5–5)
ALBUMIN/GLOB SERPL: 1.3 (ref 1.1–2.2)
ALP SERPL-CCNC: 74 U/L (ref 45–117)
ALT SERPL-CCNC: 25 U/L (ref 12–78)
ANION GAP SERPL CALC-SCNC: 8 MMOL/L (ref 2–12)
AST SERPL-CCNC: 16 U/L (ref 15–37)
BILIRUB SERPL-MCNC: 0.6 MG/DL (ref 0.2–1)
BUN SERPL-MCNC: 14 MG/DL (ref 6–20)
BUN/CREAT SERPL: 15 (ref 12–20)
CALCIUM SERPL-MCNC: 9.6 MG/DL (ref 8.5–10.1)
CHLORIDE SERPL-SCNC: 105 MMOL/L (ref 97–108)
CHOLEST SERPL-MCNC: 201 MG/DL
CO2 SERPL-SCNC: 26 MMOL/L (ref 21–32)
CREAT SERPL-MCNC: 0.91 MG/DL (ref 0.55–1.02)
ERYTHROCYTE [DISTWIDTH] IN BLOOD BY AUTOMATED COUNT: 12.6 % (ref 11.5–14.5)
EST. AVERAGE GLUCOSE BLD GHB EST-MCNC: 108 MG/DL
GLOBULIN SER CALC-MCNC: 3.2 G/DL (ref 2–4)
GLUCOSE SERPL-MCNC: 114 MG/DL (ref 65–100)
HBA1C MFR BLD: 5.4 % (ref 4–5.6)
HCT VFR BLD AUTO: 39 % (ref 35–47)
HDLC SERPL-MCNC: 59 MG/DL
HDLC SERPL: 3.4 (ref 0–5)
HGB BLD-MCNC: 12.6 G/DL (ref 11.5–16)
LDLC SERPL CALC-MCNC: 119.4 MG/DL (ref 0–100)
MCH RBC QN AUTO: 29.9 PG (ref 26–34)
MCHC RBC AUTO-ENTMCNC: 32.3 G/DL (ref 30–36.5)
MCV RBC AUTO: 92.4 FL (ref 80–99)
NRBC # BLD: 0 K/UL (ref 0–0.01)
NRBC BLD-RTO: 0 PER 100 WBC
PLATELET # BLD AUTO: 260 K/UL (ref 150–400)
PMV BLD AUTO: 10.3 FL (ref 8.9–12.9)
POTASSIUM SERPL-SCNC: 3.8 MMOL/L (ref 3.5–5.1)
PROT SERPL-MCNC: 7.3 G/DL (ref 6.4–8.2)
RBC # BLD AUTO: 4.22 M/UL (ref 3.8–5.2)
SODIUM SERPL-SCNC: 139 MMOL/L (ref 136–145)
TRIGL SERPL-MCNC: 113 MG/DL
TSH SERPL DL<=0.05 MIU/L-ACNC: 2.46 UIU/ML (ref 0.36–3.74)
VLDLC SERPL CALC-MCNC: 22.6 MG/DL
WBC # BLD AUTO: 4.4 K/UL (ref 3.6–11)

## 2025-02-10 SDOH — ECONOMIC STABILITY: FOOD INSECURITY: WITHIN THE PAST 12 MONTHS, YOU WORRIED THAT YOUR FOOD WOULD RUN OUT BEFORE YOU GOT MONEY TO BUY MORE.: NEVER TRUE

## 2025-02-10 SDOH — ECONOMIC STABILITY: FOOD INSECURITY: WITHIN THE PAST 12 MONTHS, THE FOOD YOU BOUGHT JUST DIDN'T LAST AND YOU DIDN'T HAVE MONEY TO GET MORE.: NEVER TRUE

## 2025-02-10 SDOH — HEALTH STABILITY: PHYSICAL HEALTH: ON AVERAGE, HOW MANY DAYS PER WEEK DO YOU ENGAGE IN MODERATE TO STRENUOUS EXERCISE (LIKE A BRISK WALK)?: 2 DAYS

## 2025-02-10 SDOH — ECONOMIC STABILITY: INCOME INSECURITY: IN THE LAST 12 MONTHS, WAS THERE A TIME WHEN YOU WERE NOT ABLE TO PAY THE MORTGAGE OR RENT ON TIME?: NO

## 2025-02-10 SDOH — ECONOMIC STABILITY: TRANSPORTATION INSECURITY
IN THE PAST 12 MONTHS, HAS THE LACK OF TRANSPORTATION KEPT YOU FROM MEDICAL APPOINTMENTS OR FROM GETTING MEDICATIONS?: NO

## 2025-02-10 SDOH — HEALTH STABILITY: PHYSICAL HEALTH: ON AVERAGE, HOW MANY MINUTES DO YOU ENGAGE IN EXERCISE AT THIS LEVEL?: 10 MIN

## 2025-02-10 ASSESSMENT — LIFESTYLE VARIABLES
HOW OFTEN DO YOU HAVE A DRINK CONTAINING ALCOHOL: 1
HOW MANY STANDARD DRINKS CONTAINING ALCOHOL DO YOU HAVE ON A TYPICAL DAY: PATIENT DOES NOT DRINK
HOW OFTEN DO YOU HAVE SIX OR MORE DRINKS ON ONE OCCASION: 1
HOW MANY STANDARD DRINKS CONTAINING ALCOHOL DO YOU HAVE ON A TYPICAL DAY: 0
HOW OFTEN DO YOU HAVE A DRINK CONTAINING ALCOHOL: NEVER

## 2025-02-10 ASSESSMENT — PATIENT HEALTH QUESTIONNAIRE - PHQ9
SUM OF ALL RESPONSES TO PHQ QUESTIONS 1-9: 0
1. LITTLE INTEREST OR PLEASURE IN DOING THINGS: NOT AT ALL
2. FEELING DOWN, DEPRESSED OR HOPELESS: NOT AT ALL
SUM OF ALL RESPONSES TO PHQ9 QUESTIONS 1 & 2: 0

## 2025-02-13 ENCOUNTER — OFFICE VISIT (OUTPATIENT)
Age: 80
End: 2025-02-13
Payer: MEDICARE

## 2025-02-13 VITALS
RESPIRATION RATE: 18 BRPM | OXYGEN SATURATION: 100 % | BODY MASS INDEX: 26.42 KG/M2 | HEART RATE: 65 BPM | SYSTOLIC BLOOD PRESSURE: 138 MMHG | DIASTOLIC BLOOD PRESSURE: 70 MMHG | TEMPERATURE: 97.8 F | WEIGHT: 134.6 LBS | HEIGHT: 60 IN

## 2025-02-13 DIAGNOSIS — E78.2 MIXED DYSLIPIDEMIA: ICD-10-CM

## 2025-02-13 DIAGNOSIS — Z00.00 MEDICARE ANNUAL WELLNESS VISIT, SUBSEQUENT: Primary | ICD-10-CM

## 2025-02-13 DIAGNOSIS — R73.03 PREDIABETES: ICD-10-CM

## 2025-02-13 DIAGNOSIS — I10 BENIGN ESSENTIAL HYPERTENSION: ICD-10-CM

## 2025-02-13 DIAGNOSIS — Z23 ENCOUNTER FOR IMMUNIZATION: ICD-10-CM

## 2025-02-13 PROCEDURE — G0009 ADMIN PNEUMOCOCCAL VACCINE: HCPCS | Performed by: FAMILY MEDICINE

## 2025-02-13 PROCEDURE — 99214 OFFICE O/P EST MOD 30 MIN: CPT | Performed by: FAMILY MEDICINE

## 2025-02-13 ASSESSMENT — ENCOUNTER SYMPTOMS
GASTROINTESTINAL NEGATIVE: 1
RESPIRATORY NEGATIVE: 1
EYES NEGATIVE: 1

## 2025-02-13 NOTE — PROGRESS NOTES
Chief Complaint   Patient presents with    Medicare AWV     \"Have you been to the ER, urgent care clinic since your last visit?  Hospitalized since your last visit?\"    YES - When: approximately 4 months ago.  Where and Why: [facility]: Back .    “Have you seen or consulted any other health care providers outside of [facility] since your last visit?”    NO                   2/10/2025    12:41 PM   PHQ-9    Little interest or pleasure in doing things 0   Feeling down, depressed, or hopeless 0   PHQ-2 Score 0   PHQ-9 Total Score 0           Financial Resource Strain: Low Risk  (12/31/2023)    Overall Financial Resource Strain (CARDIA)     Difficulty of Paying Living Expenses: Not very hard      Food Insecurity: No Food Insecurity (2/10/2025)    Hunger Vital Sign     Worried About Running Out of Food in the Last Year: Never true     Ran Out of Food in the Last Year: Never true          Health Maintenance Due   Topic Date Due    Lung Cancer Screening &/or Counseling  Never done    Pneumococcal 50+ years Vaccine (2 of 2 - PCV) 10/10/2017    Respiratory Syncytial Virus (RSV) Pregnant or age 60 yrs+ (1 - 1-dose 75+ series) Never done    Flu vaccine (1) 08/01/2024    COVID-19 Vaccine (5 - 2024-25 season) 09/01/2024    DTaP/Tdap/Td vaccine (2 - Td or Tdap) 11/05/2024    Annual Wellness Visit (Medicare)  01/03/2025

## 2025-02-13 NOTE — PATIENT INSTRUCTIONS
list of the medicines you take.  What should you include in an advance directive?  Many states have a unique advance directive form. (It may ask you to address specific issues.) Or you might use a universal form that's approved by many states.  If your form doesn't tell you what to address, it may be hard to know what to include in your advance directive. Use the questions below to help you get started.  Who do you want to make decisions about your medical care if you are not able to?  What life-support measures do you want if you have a serious illness that gets worse over time or can't be cured?  What are you most afraid of that might happen? (Maybe you're afraid of having pain, losing your independence, or being kept alive by machines.)  Where would you prefer to die? (Your home? A hospital? A nursing home?)  Do you want to donate your organs when you die?  Do you want certain [employer] practices performed before you die?  When should you call for help?  Be sure to contact your doctor if you have any questions.  Where can you learn more?  Go to https://www.[employer].net/patientEd and enter R264 to learn more about \"Advance Directives: Care Instructions.\"  Current as of: November 16, 2023  Content Version: 14.3  © 2024 [employer].   Care instructions adapted under license by [employer]. If you have questions about a medical condition or this instruction, always ask your healthcare professional. [employer] disclaims any warranty or liability for your use of this information.         Learning About Lung Cancer Screening  What is screening for lung cancer?     Lung cancer screening is a way to find some lung cancers early, before a person has any symptoms of the cancer.  Lung cancer screening may help those who have the highest risk for lung cancer--people age 50 and older who are or were heavy smokers. For most people, who aren't at increased risk, screening for lung cancer probably isn't

## 2025-02-13 NOTE — PROGRESS NOTES
Medicare Annual Wellness Visit    Madeleine Ruiz is here for Medicare AWV, Cholesterol Problem & Impaired Fasting Glucose (Fasting labs done 2/7/25), and Hypertension    Assessment & Plan   Medicare annual wellness visit, subsequent  Recommendations for Preventive Services Due: see orders and patient instructions/AVS.  Recommended screening schedule for the next 5-10 years is provided to the patient in written form: see Patient Instructions/AVS.    Encounter for immunization  -     Pneumococcal, PCV20, PREVNAR 20, (age 6w+), IM, PF    Mixed dyslipidemia  Recent fasting lipids, goals and trends r/w pt  Improved over time w/ diet, exercise, fish oil supplements    Prediabetes  Hgba1c and goals reviewed  Recent and previous Hgba1c & trends r/w pt, her values have remained good and normal over time w/ healthy diet    Benign essential hypertension  Controlled, stable.  Continue regimen of Norvasc 10 mg daily and Diovan 320-12.5 mg daily    Complete fasting labs from 2/7/25 r/w pt in detail today.    Reviewed diet, nutrition, exercise, weight management/goals, risk reduction, cardiovascular goals for age and associated health risks.    Reviewed medications, effects, risks, benefits, precautions, potential interactions and possible side effects.     Age/risk based screening recommendations, health maintenance & prevention counseling. Cancer screening USPTFS guidelines reviewed w/ pt today. Discussed benefits/positive/negative outcomes of screening based on age/risk stratification. Informed consent for/against screening based on pt's personal hx/risk factors. Updated in history above, health maintenance section of chart and nurse's note.        Return in 1 year (on 2/13/2026) for Medicare AWV.     Subjective   Patient with Benign Essential Hypertension, Mixed Dyslipidemia, Prediabetes presents for routine follow up, medication check and review of fasting labs from 2/7/25. Complying routinely with medication regimen and

## 2025-02-25 ENCOUNTER — TELEPHONE (OUTPATIENT)
Age: 80
End: 2025-02-25

## 2025-02-25 DIAGNOSIS — R11.2 NAUSEA, VOMITING, AND DIARRHEA: Primary | ICD-10-CM

## 2025-02-25 DIAGNOSIS — R19.7 NAUSEA, VOMITING, AND DIARRHEA: Primary | ICD-10-CM

## 2025-02-25 RX ORDER — ONDANSETRON 4 MG/1
4 TABLET, FILM COATED ORAL EVERY 8 HOURS PRN
Qty: 20 TABLET | Refills: 0 | Status: SHIPPED | OUTPATIENT
Start: 2025-02-25

## 2025-02-25 RX ORDER — AMLODIPINE BESYLATE 10 MG/1
10 TABLET ORAL DAILY
Qty: 90 TABLET | Refills: 3 | Status: SHIPPED | OUTPATIENT
Start: 2025-02-25

## 2025-02-25 NOTE — TELEPHONE ENCOUNTER
Spoke to pt.  She notes that  had the same thing on Sunday.  He is doing better.  She started last night with diarrhea and vomiting.  She has been vomiting about every hour, now mostly just dry heaving.  Diarrhea 3 times last night and 3-4 times today.  No fever.  She did have some ginger ale a while ago and that stayed down.  She feels like she may have turned a corner but would like something for the nausea.  I confirmed pharmacy on file.  I will call her back with further instructions.  She does have imodium at home but didn't want to take on empty stomach

## 2025-02-25 NOTE — TELEPHONE ENCOUNTER
LVM for pt that Dr Veloz sent in the zofran.  I left detailed message about dehydration and when to go to ER.  Also went over Dr Veloz recommendations.  Asked her to call me back if any further questions or concerns.

## 2025-02-25 NOTE — TELEPHONE ENCOUNTER
Sent in rx for Zofran. Supportive measures with plenty of low sugar concentration fluids, bland diet, gradually advancing as tolerated. May use Immodium if afebrile and no blood in stools. Pepto bismal or OTC antacids prn.  about s/sxs dehydration and instructions that warrant ER such as onset of significant abd pain, high fevers, bloody diarrhea, refractory N/V, decreased po or other significant changes.

## 2025-02-25 NOTE — TELEPHONE ENCOUNTER
Pt  called asking for a refill on medication Ondansetron OTD 4 MG do to diarrhea and numerousness. Pt husbands is asking for a call back with updates.    BCBN [phone]

## 2025-02-25 NOTE — TELEPHONE ENCOUNTER
Patient's son called both parents has diarrhea and vomiting for 2 days. The both are in bed would like for the nurse to give a call    Best call back # [phone]

## 2025-02-25 NOTE — TELEPHONE ENCOUNTER
PCP: Kiya Gottlieb MD    Last appt: 2/13/2025     No future appointments.    Requested Prescriptions     Pending Prescriptions Disp Refills    amLODIPine (NORVASC) 10 MG tablet [Pharmacy Med Name: AMLODIPINE BESYLATE 10MG TABLETS] 90 tablet 0     Sig: TAKE 1 TABLET BY MOUTH DAILY       Prior labs and Blood pressures:  BP Readings from Last 3 Encounters:   02/13/25 138/70   01/03/24 128/74   11/30/22 (!) 146/80     Lab Results   Component Value Date/Time     02/07/2025 08:55 AM    K 3.8 02/07/2025 08:55 AM     02/07/2025 08:55 AM    CO2 26 02/07/2025 08:55 AM    BUN 14 02/07/2025 08:55 AM    GFRAA >60 12/17/2021 01:27 PM     No results found for: \"HBA1C\", \"[id number]\"  Lab Results   Component Value Date/Time    CHOL 201 02/07/2025 08:55 AM    HDL 59 02/07/2025 08:55 AM    .4 02/07/2025 08:55 AM    .2 01/03/2024 10:18 AM    VLDL 22.6 02/07/2025 08:55 AM     No results found for: \"VITD3\"    Lab Results   Component Value Date/Time    TSH 2.46 02/07/2025 08:55 AM

## 2025-03-06 DIAGNOSIS — I10 BENIGN ESSENTIAL HYPERTENSION: ICD-10-CM

## 2025-03-07 NOTE — TELEPHONE ENCOUNTER
Last appointment: 2/13/25 MD GRIFFIN  Next appointment: None- due 2/2026  Previous refill encounter(s): 11/28/24 90    Requested Prescriptions     Pending Prescriptions Disp Refills    valsartan-hydroCHLOROthiazide (DIOVAN-HCT) 320-12.5 MG per tablet [Pharmacy Med Name: VALSARTAN/HCTZ 320MG/12.5MG TABLETS] 90 tablet 3     Sig: Take 1 tablet by mouth daily     For Pharmacy Admin Tracking Only    Program: Medication Refill  CPA in place:    Recommendation Provided To:   Intervention Detail: New Rx: 1, reason: Patient Preference  Intervention Accepted By:   Gap Closed?:    Time Spent (min): 5

## 2025-03-08 RX ORDER — VALSARTAN AND HYDROCHLOROTHIAZIDE 320; 12.5 MG/1; MG/1
1 TABLET, FILM COATED ORAL DAILY
Qty: 90 TABLET | Refills: 3 | Status: SHIPPED | OUTPATIENT
Start: 2025-03-08